# Patient Record
Sex: FEMALE | Race: WHITE | NOT HISPANIC OR LATINO | Employment: OTHER | URBAN - NONMETROPOLITAN AREA
[De-identification: names, ages, dates, MRNs, and addresses within clinical notes are randomized per-mention and may not be internally consistent; named-entity substitution may affect disease eponyms.]

---

## 2017-06-24 ENCOUNTER — HOSPITAL ENCOUNTER (EMERGENCY)
Facility: HOSPITAL | Age: 67
Discharge: HOME | End: 2017-06-25
Attending: FAMILY MEDICINE | Admitting: FAMILY MEDICINE

## 2017-06-24 ENCOUNTER — APPOINTMENT (OUTPATIENT)
Dept: CT IMAGING | Facility: HOSPITAL | Age: 67
End: 2017-06-24

## 2017-06-24 ENCOUNTER — APPOINTMENT (OUTPATIENT)
Dept: GENERAL RADIOLOGY | Facility: HOSPITAL | Age: 67
End: 2017-06-24

## 2017-06-24 DIAGNOSIS — J44.1 COPD WITH ACUTE EXACERBATION (HCC): Primary | ICD-10-CM

## 2017-06-24 DIAGNOSIS — J18.9 CAP (COMMUNITY ACQUIRED PNEUMONIA): ICD-10-CM

## 2017-06-24 LAB
6-ACETYL MORPHINE: NEGATIVE
A-A DO2: 30 MMHG (ref 0–300)
ALBUMIN SERPL-MCNC: 3.9 G/DL (ref 3.4–4.8)
ALBUMIN/GLOB SERPL: 1.6 G/DL (ref 1.5–2.5)
ALP SERPL-CCNC: 57 U/L (ref 35–104)
ALT SERPL W P-5'-P-CCNC: 10 U/L (ref 10–36)
AMPHET+METHAMPHET UR QL: NEGATIVE
ANION GAP SERPL CALCULATED.3IONS-SCNC: 3.6 MMOL/L (ref 3.6–11.2)
APTT PPP: <23 SECONDS (ref 24.4–31)
ARTERIAL PATENCY WRIST A: POSITIVE
AST SERPL-CCNC: 12 U/L (ref 10–30)
ATMOSPHERIC PRESS: 725 MMHG
BARBITURATES UR QL SCN: NEGATIVE
BASE EXCESS BLDA CALC-SCNC: -5.8 MMOL/L
BASOPHILS # BLD AUTO: 0.02 10*3/MM3 (ref 0–0.3)
BASOPHILS NFR BLD AUTO: 0.1 % (ref 0–2)
BDY SITE: ABNORMAL
BENZODIAZ UR QL SCN: POSITIVE
BILIRUB SERPL-MCNC: 0.1 MG/DL (ref 0.2–1.8)
BILIRUB UR QL STRIP: NEGATIVE
BNP SERPL-MCNC: 116 PG/ML (ref 0–100)
BODY TEMPERATURE: 98.6 C
BUN BLD-MCNC: 17 MG/DL (ref 7–21)
BUN/CREAT SERPL: 17.7 (ref 7–25)
BUPRENORPHINE SERPL-MCNC: NEGATIVE NG/ML
CALCIUM SPEC-SCNC: 8.8 MG/DL (ref 7.7–10)
CANNABINOIDS SERPL QL: NEGATIVE
CHLORIDE SERPL-SCNC: 114 MMOL/L (ref 99–112)
CK MB SERPL-CCNC: 1.8 NG/ML (ref 0–5)
CK MB SERPL-RTO: 3.2 % (ref 0–3)
CK SERPL-CCNC: 57 U/L (ref 24–173)
CLARITY UR: CLEAR
CO2 SERPL-SCNC: 26.4 MMOL/L (ref 24.3–31.9)
COCAINE UR QL: NEGATIVE
COHGB MFR BLD: 3.1 % (ref 0–5)
COLOR UR: YELLOW
CREAT BLD-MCNC: 0.96 MG/DL (ref 0.43–1.29)
DEPRECATED RDW RBC AUTO: 49.2 FL (ref 37–54)
EOSINOPHIL # BLD AUTO: 0.34 10*3/MM3 (ref 0–0.7)
EOSINOPHIL NFR BLD AUTO: 2 % (ref 0–7)
ERYTHROCYTE [DISTWIDTH] IN BLOOD BY AUTOMATED COUNT: 15.3 % (ref 11.5–14.5)
GFR SERPL CREATININE-BSD FRML MDRD: 58 ML/MIN/1.73
GLOBULIN UR ELPH-MCNC: 2.5 GM/DL
GLUCOSE BLD-MCNC: 62 MG/DL (ref 70–110)
GLUCOSE UR STRIP-MCNC: NEGATIVE MG/DL
HCO3 BLDA-SCNC: 18.3 MMOL/L (ref 22–26)
HCT VFR BLD AUTO: 37.7 % (ref 37–47)
HCT VFR BLD CALC: 35 % (ref 37–47)
HGB BLD-MCNC: 11.8 G/DL (ref 12–16)
HGB BLDA-MCNC: 12 G/DL (ref 12–16)
HGB UR QL STRIP.AUTO: NEGATIVE
HOROWITZ INDEX BLD+IHG-RTO: 21 %
IMM GRANULOCYTES # BLD: 0.13 10*3/MM3 (ref 0–0.03)
IMM GRANULOCYTES NFR BLD: 0.8 % (ref 0–0.5)
INR PPP: 0.97 (ref 0.8–1.1)
KETONES UR QL STRIP: NEGATIVE
LEUKOCYTE ESTERASE UR QL STRIP.AUTO: NEGATIVE
LYMPHOCYTES # BLD AUTO: 3.36 10*3/MM3 (ref 1–3)
LYMPHOCYTES NFR BLD AUTO: 19.8 % (ref 16–46)
MCH RBC QN AUTO: 29 PG (ref 27–33)
MCHC RBC AUTO-ENTMCNC: 31.3 G/DL (ref 33–37)
MCV RBC AUTO: 92.6 FL (ref 80–94)
MDMA UR QL SCN: NEGATIVE
METHADONE UR QL SCN: NEGATIVE
METHGB BLD QL: 0.3 % (ref 0–3)
MODALITY: ABNORMAL
MONOCYTES # BLD AUTO: 1.61 10*3/MM3 (ref 0.1–0.9)
MONOCYTES NFR BLD AUTO: 9.5 % (ref 0–12)
NEUTROPHILS # BLD AUTO: 11.54 10*3/MM3 (ref 1.4–6.5)
NEUTROPHILS NFR BLD AUTO: 67.8 % (ref 40–75)
NITRITE UR QL STRIP: NEGATIVE
OPIATES UR QL: POSITIVE
OSMOLALITY SERPL CALC.SUM OF ELEC: 286.4 MOSM/KG (ref 273–305)
OXYCODONE UR QL SCN: NEGATIVE
OXYHGB MFR BLDV: 91.7 % (ref 85–100)
PCO2 BLDA: 31.4 MM HG (ref 35–45)
PCP UR QL SCN: NEGATIVE
PH BLDA: 7.38 PH UNITS (ref 7.35–7.45)
PH UR STRIP.AUTO: <=5 [PH] (ref 5–8)
PLATELET # BLD AUTO: 305 10*3/MM3 (ref 130–400)
PMV BLD AUTO: 11.7 FL (ref 6–10)
PO2 BLDA: 74.8 MM HG (ref 80–100)
POTASSIUM BLD-SCNC: 4.5 MMOL/L (ref 3.5–5.3)
PROT SERPL-MCNC: 6.4 G/DL (ref 6–8)
PROT UR QL STRIP: NEGATIVE
PROTHROMBIN TIME: 10.8 SECONDS (ref 9.8–11.9)
RBC # BLD AUTO: 4.07 10*6/MM3 (ref 4.2–5.4)
SAO2 % BLDCOA: 94.9 % (ref 90–100)
SODIUM BLD-SCNC: 144 MMOL/L (ref 135–153)
SP GR UR STRIP: 1.03 (ref 1–1.03)
TROPONIN I SERPL-MCNC: <0.006 NG/ML
UROBILINOGEN UR QL STRIP: NORMAL
WBC NRBC COR # BLD: 17 10*3/MM3 (ref 4.5–12.5)

## 2017-06-24 PROCEDURE — 82805 BLOOD GASES W/O2 SATURATION: CPT | Performed by: FAMILY MEDICINE

## 2017-06-24 PROCEDURE — 80307 DRUG TEST PRSMV CHEM ANLYZR: CPT | Performed by: FAMILY MEDICINE

## 2017-06-24 PROCEDURE — 85025 COMPLETE CBC W/AUTO DIFF WBC: CPT | Performed by: FAMILY MEDICINE

## 2017-06-24 PROCEDURE — 82553 CREATINE MB FRACTION: CPT | Performed by: FAMILY MEDICINE

## 2017-06-24 PROCEDURE — 82550 ASSAY OF CK (CPK): CPT | Performed by: FAMILY MEDICINE

## 2017-06-24 PROCEDURE — 81003 URINALYSIS AUTO W/O SCOPE: CPT | Performed by: FAMILY MEDICINE

## 2017-06-24 PROCEDURE — 94799 UNLISTED PULMONARY SVC/PX: CPT

## 2017-06-24 PROCEDURE — 84484 ASSAY OF TROPONIN QUANT: CPT | Performed by: FAMILY MEDICINE

## 2017-06-24 PROCEDURE — 71275 CT ANGIOGRAPHY CHEST: CPT | Performed by: RADIOLOGY

## 2017-06-24 PROCEDURE — 36600 WITHDRAWAL OF ARTERIAL BLOOD: CPT | Performed by: FAMILY MEDICINE

## 2017-06-24 PROCEDURE — 71010 XR CHEST 1 VW: CPT | Performed by: RADIOLOGY

## 2017-06-24 PROCEDURE — 93010 ELECTROCARDIOGRAM REPORT: CPT | Performed by: INTERNAL MEDICINE

## 2017-06-24 PROCEDURE — 93005 ELECTROCARDIOGRAM TRACING: CPT | Performed by: FAMILY MEDICINE

## 2017-06-24 PROCEDURE — 94640 AIRWAY INHALATION TREATMENT: CPT

## 2017-06-24 PROCEDURE — 71010 HC CHEST PA OR AP: CPT

## 2017-06-24 PROCEDURE — 87040 BLOOD CULTURE FOR BACTERIA: CPT | Performed by: FAMILY MEDICINE

## 2017-06-24 PROCEDURE — 80053 COMPREHEN METABOLIC PANEL: CPT | Performed by: FAMILY MEDICINE

## 2017-06-24 PROCEDURE — 83880 ASSAY OF NATRIURETIC PEPTIDE: CPT | Performed by: FAMILY MEDICINE

## 2017-06-24 PROCEDURE — 99284 EMERGENCY DEPT VISIT MOD MDM: CPT

## 2017-06-24 PROCEDURE — 83050 HGB METHEMOGLOBIN QUAN: CPT | Performed by: FAMILY MEDICINE

## 2017-06-24 PROCEDURE — 71275 CT ANGIOGRAPHY CHEST: CPT

## 2017-06-24 PROCEDURE — 82375 ASSAY CARBOXYHB QUANT: CPT | Performed by: FAMILY MEDICINE

## 2017-06-24 PROCEDURE — 85730 THROMBOPLASTIN TIME PARTIAL: CPT | Performed by: FAMILY MEDICINE

## 2017-06-24 PROCEDURE — 85610 PROTHROMBIN TIME: CPT | Performed by: FAMILY MEDICINE

## 2017-06-24 PROCEDURE — 87086 URINE CULTURE/COLONY COUNT: CPT | Performed by: FAMILY MEDICINE

## 2017-06-24 RX ORDER — METHYLPREDNISOLONE SODIUM SUCCINATE 125 MG/2ML
125 INJECTION, POWDER, LYOPHILIZED, FOR SOLUTION INTRAMUSCULAR; INTRAVENOUS ONCE
Status: COMPLETED | OUTPATIENT
Start: 2017-06-24 | End: 2017-06-25

## 2017-06-24 RX ORDER — GABAPENTIN 800 MG/1
800 TABLET ORAL 3 TIMES DAILY
COMMUNITY

## 2017-06-24 RX ORDER — IPRATROPIUM BROMIDE AND ALBUTEROL SULFATE 2.5; .5 MG/3ML; MG/3ML
3 SOLUTION RESPIRATORY (INHALATION) ONCE
Status: COMPLETED | OUTPATIENT
Start: 2017-06-24 | End: 2017-06-24

## 2017-06-24 RX ORDER — HYDROCODONE BITARTRATE AND ACETAMINOPHEN 7.5; 325 MG/1; MG/1
1 TABLET ORAL EVERY 8 HOURS PRN
COMMUNITY

## 2017-06-24 RX ORDER — SODIUM CHLORIDE 0.9 % (FLUSH) 0.9 %
10 SYRINGE (ML) INJECTION AS NEEDED
Status: DISCONTINUED | OUTPATIENT
Start: 2017-06-24 | End: 2017-06-25 | Stop reason: HOSPADM

## 2017-06-24 RX ORDER — ASPIRIN 81 MG/1
81 TABLET, CHEWABLE ORAL DAILY
COMMUNITY

## 2017-06-24 RX ADMIN — IPRATROPIUM BROMIDE AND ALBUTEROL SULFATE 3 ML: .5; 3 SOLUTION RESPIRATORY (INHALATION) at 22:01

## 2017-06-25 VITALS
TEMPERATURE: 97.4 F | RESPIRATION RATE: 20 BRPM | BODY MASS INDEX: 20.2 KG/M2 | HEIGHT: 61 IN | OXYGEN SATURATION: 98 % | DIASTOLIC BLOOD PRESSURE: 80 MMHG | HEART RATE: 80 BPM | WEIGHT: 107 LBS | SYSTOLIC BLOOD PRESSURE: 129 MMHG

## 2017-06-25 PROCEDURE — 96374 THER/PROPH/DIAG INJ IV PUSH: CPT

## 2017-06-25 PROCEDURE — 0 IOPAMIDOL PER 1 ML: Performed by: FAMILY MEDICINE

## 2017-06-25 PROCEDURE — 25010000002 METHYLPREDNISOLONE PER 125 MG: Performed by: FAMILY MEDICINE

## 2017-06-25 RX ORDER — DOXYCYCLINE HYCLATE 100 MG/1
100 TABLET, DELAYED RELEASE ORAL 2 TIMES DAILY
Qty: 20 TABLET | Refills: 0 | Status: SHIPPED | OUTPATIENT
Start: 2017-06-25 | End: 2017-07-05

## 2017-06-25 RX ORDER — PREDNISONE 20 MG/1
20 TABLET ORAL 3 TIMES DAILY
Qty: 15 TABLET | Refills: 0 | Status: SHIPPED | OUTPATIENT
Start: 2017-06-25 | End: 2017-06-30

## 2017-06-25 RX ORDER — DOXYCYCLINE 100 MG/1
100 CAPSULE ORAL ONCE
Status: COMPLETED | OUTPATIENT
Start: 2017-06-25 | End: 2017-06-25

## 2017-06-25 RX ADMIN — DOXYCYCLINE 100 MG: 100 CAPSULE ORAL at 00:47

## 2017-06-25 RX ADMIN — IOPAMIDOL 80 ML: 755 INJECTION, SOLUTION INTRAVENOUS at 00:04

## 2017-06-25 RX ADMIN — METHYLPREDNISOLONE SODIUM SUCCINATE 125 MG: 125 INJECTION, POWDER, FOR SOLUTION INTRAMUSCULAR; INTRAVENOUS at 00:08

## 2017-06-25 NOTE — ED NOTES
[facility] spoke with Marisel requested ER records from last week. She request a signed medical records release faxed to [phone].      Heather Symes  06/24/17 0173

## 2017-06-25 NOTE — ED PROVIDER NOTES
Subjective   Patient is a 66 y.o. female presenting with shortness of breath.   History provided by:  Patient  Shortness of Breath   Severity:  Mild  Onset quality:  Gradual  Timing:  Intermittent  Progression:  Worsening  Chronicity:  Recurrent  Context: not activity, not occupational exposure and not URI    Associated symptoms: cough    Associated symptoms: no abdominal pain, no chest pain, no claudication, no headaches, no neck pain, no rash, no vomiting and no wheezing    Risk factors: tobacco use    Risk factors: no recent alcohol use, no family hx of DVT, no hx of cancer, no hx of PE/DVT, no obesity and no oral contraceptive use        Review of Systems   Constitutional: Negative for activity change, appetite change, chills and fatigue.   HENT: Negative for congestion.    Eyes: Negative for pain.   Respiratory: Positive for cough and shortness of breath. Negative for wheezing and stridor.    Cardiovascular: Negative for chest pain and claudication.   Gastrointestinal: Negative for abdominal pain, diarrhea, nausea and vomiting.   Genitourinary: Negative for dysuria.   Musculoskeletal: Negative for arthralgias, myalgias, neck pain and neck stiffness.   Skin: Negative for rash.   Neurological: Negative for dizziness, syncope, speech difficulty, weakness and headaches.   Psychiatric/Behavioral: Negative for agitation.       History reviewed. No pertinent past medical history.    No Known Allergies    History reviewed. No pertinent surgical history.    Family History   Problem Relation Age of Onset   • No Known Problems Mother    • No Known Problems Father    • No Known Problems Sister    • No Known Problems Brother    • No Known Problems Son    • No Known Problems Daughter    • No Known Problems Maternal Grandmother    • No Known Problems Maternal Grandfather    • No Known Problems Paternal Grandmother    • No Known Problems Paternal Grandfather    • No Known Problems Cousin    • Rheum arthritis Neg Hx    •  Osteoarthritis Neg Hx    • Asthma Neg Hx    • Diabetes Neg Hx    • Heart failure Neg Hx    • Hyperlipidemia Neg Hx    • Hypertension Neg Hx    • Migraines Neg Hx    • Rashes / Skin problems Neg Hx    • Seizures Neg Hx    • Stroke Neg Hx    • Thyroid disease Neg Hx        Social History     Social History   • Marital status:      Spouse name: N/A   • Number of children: N/A   • Years of education: N/A     Social History Main Topics   • Smoking status: Former Smoker   • Smokeless tobacco: Never Used   • Alcohol use No   • Drug use: No   • Sexual activity: Defer     Other Topics Concern   • None     Social History Narrative   • None           Objective   Physical Exam   Constitutional: She is oriented to person, place, and time. She appears well-developed.   HENT:   Head: Normocephalic and atraumatic.   Right Ear: External ear normal.   Left Ear: External ear normal.   Mouth/Throat: Oropharynx is clear and moist.   Eyes: EOM are normal. Pupils are equal, round, and reactive to light.   Neck: Neck supple. No thyromegaly present.   Cardiovascular: Normal rate.    Pulmonary/Chest: Effort normal and breath sounds normal.   Abdominal: Soft. Bowel sounds are normal.   Musculoskeletal: Normal range of motion.   Neurological: She is alert and oriented to person, place, and time.   Skin: Skin is warm. No rash noted. No erythema. No pallor.   Psychiatric: She has a normal mood and affect. Her behavior is normal. Judgment and thought content normal.   Nursing note and vitals reviewed.      Procedures         ED Course  ED Course   Value Comment By Time   ECG 12 Lead EKG interpretation time is 2159 sinus rhythm 76 bpm [place] interval 114 QRS duration is 70 QT is 382 QTc is 429 no evidence of acute ischemic change Ivonne Wagner, DO 06/25 0000    REviewed medical records for [facility] from 6/13 - pt went for same complaints SOA, back pain - pt had stated that they told her she had a lung mass and that she would  have to go see her pcp and get further evaluation- pt is unable to get into her pcp; says that her symptoms are the same- records show t hat she was treated for COPD exacerbation; no mention of black spot on her lung; just RLL pneumonia and cOPD Ivonne Alcantara Kristy, DO 06/25 0644                  MDM  Number of Diagnoses or Management Options  CAP (community acquired pneumonia): new and does not require workup  COPD with acute exacerbation: new and does not require workup     Amount and/or Complexity of Data Reviewed  Clinical lab tests: ordered and reviewed  Tests in the radiology section of CPT®: ordered and reviewed  Tests in the medicine section of CPT®: reviewed and ordered  Independent visualization of images, tracings, or specimens: yes    Risk of Complications, Morbidity, and/or Mortality  Presenting problems: moderate  Diagnostic procedures: moderate  Management options: moderate    Patient Progress  Patient progress: stable      Final diagnoses:   COPD with acute exacerbation   CAP (community acquired pneumonia)            Ivonne Otooledestiny Wagner, DO  06/26/17 0763

## 2017-06-25 NOTE — ED NOTES
Faxed medical records release to [facility] at [phone].      Heather Symes  06/24/17 1387

## 2017-06-27 LAB — BACTERIA SPEC AEROBE CULT: NORMAL

## 2017-06-29 LAB
BACTERIA SPEC AEROBE CULT: NORMAL
BACTERIA SPEC AEROBE CULT: NORMAL

## 2018-03-05 ENCOUNTER — HOSPITAL ENCOUNTER (EMERGENCY)
Facility: HOSPITAL | Age: 68
Discharge: HOME | End: 2018-03-05
Attending: EMERGENCY MEDICINE | Admitting: EMERGENCY MEDICINE

## 2018-03-05 ENCOUNTER — APPOINTMENT (OUTPATIENT)
Dept: GENERAL RADIOLOGY | Facility: HOSPITAL | Age: 68
End: 2018-03-05

## 2018-03-05 VITALS
RESPIRATION RATE: 20 BRPM | BODY MASS INDEX: 20.2 KG/M2 | TEMPERATURE: 98 F | OXYGEN SATURATION: 98 % | DIASTOLIC BLOOD PRESSURE: 88 MMHG | HEIGHT: 61 IN | SYSTOLIC BLOOD PRESSURE: 138 MMHG | HEART RATE: 80 BPM | WEIGHT: 107 LBS

## 2018-03-05 DIAGNOSIS — J44.1 COPD EXACERBATION (HCC): Primary | ICD-10-CM

## 2018-03-05 LAB
ALBUMIN SERPL-MCNC: 4.4 G/DL (ref 3.4–4.8)
ALBUMIN/GLOB SERPL: 2 G/DL (ref 1.5–2.5)
ALP SERPL-CCNC: 64 U/L (ref 35–104)
ALT SERPL W P-5'-P-CCNC: 11 U/L (ref 10–36)
ANION GAP SERPL CALCULATED.3IONS-SCNC: 6 MMOL/L (ref 3.6–11.2)
AST SERPL-CCNC: 17 U/L (ref 10–30)
BASOPHILS # BLD AUTO: 0.06 10*3/MM3 (ref 0–0.3)
BASOPHILS NFR BLD AUTO: 0.8 % (ref 0–2)
BILIRUB SERPL-MCNC: 0.2 MG/DL (ref 0.2–1.8)
BNP SERPL-MCNC: 38 PG/ML (ref 0–100)
BUN BLD-MCNC: 10 MG/DL (ref 7–21)
BUN/CREAT SERPL: 10.6 (ref 7–25)
CALCIUM SPEC-SCNC: 8.9 MG/DL (ref 7.7–10)
CHLORIDE SERPL-SCNC: 111 MMOL/L (ref 99–112)
CO2 SERPL-SCNC: 24 MMOL/L (ref 24.3–31.9)
CREAT BLD-MCNC: 0.94 MG/DL (ref 0.43–1.29)
DEPRECATED RDW RBC AUTO: 51.6 FL (ref 37–54)
EOSINOPHIL # BLD AUTO: 0.35 10*3/MM3 (ref 0–0.7)
EOSINOPHIL NFR BLD AUTO: 4.4 % (ref 0–7)
ERYTHROCYTE [DISTWIDTH] IN BLOOD BY AUTOMATED COUNT: 16 % (ref 11.5–14.5)
GFR SERPL CREATININE-BSD FRML MDRD: 60 ML/MIN/1.73
GLOBULIN UR ELPH-MCNC: 2.2 GM/DL
GLUCOSE BLD-MCNC: 86 MG/DL (ref 70–110)
HCT VFR BLD AUTO: 42.4 % (ref 37–47)
HGB BLD-MCNC: 13.3 G/DL (ref 12–16)
HOLD SPECIMEN: NORMAL
HOLD SPECIMEN: NORMAL
IMM GRANULOCYTES # BLD: 0.02 10*3/MM3 (ref 0–0.03)
IMM GRANULOCYTES NFR BLD: 0.3 % (ref 0–0.5)
LYMPHOCYTES # BLD AUTO: 1.82 10*3/MM3 (ref 1–3)
LYMPHOCYTES NFR BLD AUTO: 23 % (ref 16–46)
MCH RBC QN AUTO: 28.7 PG (ref 27–33)
MCHC RBC AUTO-ENTMCNC: 31.4 G/DL (ref 33–37)
MCV RBC AUTO: 91.4 FL (ref 80–94)
MONOCYTES # BLD AUTO: 0.84 10*3/MM3 (ref 0.1–0.9)
MONOCYTES NFR BLD AUTO: 10.6 % (ref 0–12)
NEUTROPHILS # BLD AUTO: 4.82 10*3/MM3 (ref 1.4–6.5)
NEUTROPHILS NFR BLD AUTO: 60.9 % (ref 40–75)
OSMOLALITY SERPL CALC.SUM OF ELEC: 279.6 MOSM/KG (ref 273–305)
PLATELET # BLD AUTO: 225 10*3/MM3 (ref 130–400)
PMV BLD AUTO: 11 FL (ref 6–10)
POTASSIUM BLD-SCNC: 4 MMOL/L (ref 3.5–5.3)
PROT SERPL-MCNC: 6.6 G/DL (ref 6–8)
RBC # BLD AUTO: 4.64 10*6/MM3 (ref 4.2–5.4)
SODIUM BLD-SCNC: 141 MMOL/L (ref 135–153)
TROPONIN I SERPL-MCNC: <0.006 NG/ML
WBC NRBC COR # BLD: 7.91 10*3/MM3 (ref 4.5–12.5)
WHOLE BLOOD HOLD SPECIMEN: NORMAL
WHOLE BLOOD HOLD SPECIMEN: NORMAL

## 2018-03-05 PROCEDURE — 83880 ASSAY OF NATRIURETIC PEPTIDE: CPT | Performed by: EMERGENCY MEDICINE

## 2018-03-05 PROCEDURE — 93010 ELECTROCARDIOGRAM REPORT: CPT | Performed by: INTERNAL MEDICINE

## 2018-03-05 PROCEDURE — 94799 UNLISTED PULMONARY SVC/PX: CPT

## 2018-03-05 PROCEDURE — 71046 X-RAY EXAM CHEST 2 VIEWS: CPT

## 2018-03-05 PROCEDURE — 84484 ASSAY OF TROPONIN QUANT: CPT | Performed by: EMERGENCY MEDICINE

## 2018-03-05 PROCEDURE — 63710000001 PREDNISONE PER 1 MG: Performed by: EMERGENCY MEDICINE

## 2018-03-05 PROCEDURE — 93005 ELECTROCARDIOGRAM TRACING: CPT | Performed by: EMERGENCY MEDICINE

## 2018-03-05 PROCEDURE — 85025 COMPLETE CBC W/AUTO DIFF WBC: CPT | Performed by: EMERGENCY MEDICINE

## 2018-03-05 PROCEDURE — 99284 EMERGENCY DEPT VISIT MOD MDM: CPT

## 2018-03-05 PROCEDURE — 71046 X-RAY EXAM CHEST 2 VIEWS: CPT | Performed by: RADIOLOGY

## 2018-03-05 PROCEDURE — 80053 COMPREHEN METABOLIC PANEL: CPT | Performed by: EMERGENCY MEDICINE

## 2018-03-05 PROCEDURE — 94640 AIRWAY INHALATION TREATMENT: CPT

## 2018-03-05 RX ORDER — DOXYCYCLINE HYCLATE 100 MG/1
100 TABLET, DELAYED RELEASE ORAL 2 TIMES DAILY
Qty: 20 TABLET | Refills: 0 | Status: SHIPPED | OUTPATIENT
Start: 2018-03-05 | End: 2018-03-15

## 2018-03-05 RX ORDER — SODIUM CHLORIDE 0.9 % (FLUSH) 0.9 %
10 SYRINGE (ML) INJECTION AS NEEDED
Status: DISCONTINUED | OUTPATIENT
Start: 2018-03-05 | End: 2018-03-05 | Stop reason: HOSPADM

## 2018-03-05 RX ORDER — PREDNISONE 20 MG/1
40 TABLET ORAL ONCE
Status: COMPLETED | OUTPATIENT
Start: 2018-03-05 | End: 2018-03-05

## 2018-03-05 RX ORDER — PREDNISONE 20 MG/1
20 TABLET ORAL 2 TIMES DAILY
Qty: 8 TABLET | Refills: 0 | Status: SHIPPED | OUTPATIENT
Start: 2018-03-05 | End: 2018-03-09

## 2018-03-05 RX ORDER — DOXYCYCLINE 100 MG/1
100 CAPSULE ORAL ONCE
Status: COMPLETED | OUTPATIENT
Start: 2018-03-05 | End: 2018-03-05

## 2018-03-05 RX ORDER — IPRATROPIUM BROMIDE AND ALBUTEROL SULFATE 2.5; .5 MG/3ML; MG/3ML
3 SOLUTION RESPIRATORY (INHALATION) ONCE
Status: COMPLETED | OUTPATIENT
Start: 2018-03-05 | End: 2018-03-05

## 2018-03-05 RX ADMIN — PREDNISONE 40 MG: 20 TABLET ORAL at 19:35

## 2018-03-05 RX ADMIN — DOXYCYCLINE 100 MG: 100 CAPSULE ORAL at 20:09

## 2018-03-05 RX ADMIN — IPRATROPIUM BROMIDE AND ALBUTEROL SULFATE 3 ML: .5; 3 SOLUTION RESPIRATORY (INHALATION) at 20:02

## 2018-03-05 RX ADMIN — IPRATROPIUM BROMIDE AND ALBUTEROL SULFATE 3 ML: .5; 3 SOLUTION RESPIRATORY (INHALATION) at 19:36

## 2018-03-05 NOTE — ED PROVIDER NOTES
Subjective   HPI Comments: 67-year-old female was an active smoker presents with shortness of breath.  The patient states that she has a known history of COPD.  She has not been on steroids or antibiotics for approximately 4 weeks.  The patient smokes a pack a day.  She states she is been unable to smoke any cigarettes today.  She states she has had cough and sometimes has sputum production however she feels she is unable to bring up the mucus.  The patient denies any fevers or chills.  She has home oxygen that she wears only at night.  She states that she has felt weak.  She denies any chest pain.  She does not wish to stay in the hospital at all.  She reports no hemoptysis.  Denies any history of blood clots in the leg or the lung.      History provided by:  Patient      Review of Systems   Constitutional: Positive for activity change and fatigue. Negative for chills and fever.   HENT: Negative for congestion.    Respiratory: Positive for cough, chest tightness, shortness of breath and wheezing.    Cardiovascular: Negative for chest pain.   Gastrointestinal: Negative for abdominal pain.   Genitourinary: Negative for dysuria.   Musculoskeletal: Negative for myalgias, neck pain and neck stiffness.   Skin: Negative for pallor, rash and wound.   Neurological: Positive for weakness.       Past Medical History:   Diagnosis Date   • COPD (chronic obstructive pulmonary disease)    • Hyperlipidemia    • Hypertension        No Known Allergies    History reviewed. No pertinent surgical history.    Family History   Problem Relation Age of Onset   • No Known Problems Mother    • No Known Problems Father    • No Known Problems Sister    • No Known Problems Brother    • No Known Problems Son    • No Known Problems Daughter    • No Known Problems Maternal Grandmother    • No Known Problems Maternal Grandfather    • No Known Problems Paternal Grandmother    • No Known Problems Paternal Grandfather    • No Known Problems Cousin     • Rheum arthritis Neg Hx    • Osteoarthritis Neg Hx    • Asthma Neg Hx    • Diabetes Neg Hx    • Heart failure Neg Hx    • Hyperlipidemia Neg Hx    • Hypertension Neg Hx    • Migraines Neg Hx    • Rashes / Skin problems Neg Hx    • Seizures Neg Hx    • Stroke Neg Hx    • Thyroid disease Neg Hx        Social History     Social History   • Marital status:      Social History Main Topics   • Smoking status: Current Every Day Smoker     Packs/day: 1.00     Types: Cigarettes   • Smokeless tobacco: Never Used   • Alcohol use No   • Drug use: No   • Sexual activity: Defer           Objective   Physical Exam   Constitutional: She appears well-developed.   HENT:   Head: Normocephalic.   Right Ear: External ear normal.   Left Ear: External ear normal.   Eyes: Conjunctivae are normal.   Neck: Neck supple.   Pulmonary/Chest: Effort normal. She has wheezes in the right upper field, the right middle field, the right lower field, the left upper field, the left middle field and the left lower field. She has rhonchi in the right upper field and the left upper field. She has no rales.   Abdominal: Soft.       Procedures         ED Course  ED Course    EKG performed at 1839  Normal sinus rhythm, the rate is 78.  The intervals are normal.  There are no acute ST or T-wave changes to suggest any ischemia or infarction.  Axis is normal.              MDM  Number of Diagnoses or Management Options  COPD exacerbation:   Diagnosis management comments: The patient is adamant that she will [facility].  Her oxygen level has been normal.  She did not completely clear after 2 treatments however she does sound better, reports that she feels improved states that she has aerosols and nebulizer at home and wishes to go.  The patient knows that she may come back at any time to be admitted if she needs to.  I am discharging her with steroids and antibiotics.  I did  her on smoking cessation for approximately 10 minutes.      Final  diagnoses:   COPD exacerbation            Michelle Joyner,   03/05/18 3294

## 2018-03-06 NOTE — ED NOTES
Patient lying in bed resting at this time with HOB elevated. States that she has been having increased SOB that began around 3 am. States that she has used her PRN oxygen. States that she just thought she needed to come and be seen. Denies any needs at this time. Vitals WNL. Call light within reach.      Tere Hi RN  03/05/18 1943

## 2018-03-06 NOTE — DISCHARGE INSTRUCTIONS
Chronic Obstructive Pulmonary Disease Exacerbation  Chronic obstructive pulmonary disease (COPD) is a common lung condition in which airflow from the lungs is limited. COPD is a general term that can be used to describe many different lung problems that limit airflow, including chronic bronchitis and emphysema. COPD exacerbations are episodes when breathing symptoms become much worse and require extra treatment. Without treatment, COPD exacerbations can be life threatening, and frequent COPD exacerbations can cause further damage to your lungs.  What are the causes?  · Respiratory infections.  · Exposure to smoke.  · Exposure to air pollution, chemical fumes, or dust.  Sometimes there is no apparent cause or trigger.  What increases the risk?  · Smoking cigarettes.  · Older age.  · Frequent prior COPD exacerbations.  What are the signs or symptoms?  · Increased coughing.  · Increased thick spit (sputum) production.  · Increased wheezing.  · Increased shortness of breath.  · Rapid breathing.  · Chest tightness.  How is this diagnosed?  Your medical history, a physical exam, and tests will help your health care provider make a diagnosis. Tests may include:  · A chest X-ray.  · Basic lab tests.  · Sputum testing.  · An arterial blood gas test.  How is this treated?  Depending on the severity of your COPD exacerbation, you may need to be admitted to a hospital for treatment. Some of the treatments commonly used to treat COPD exacerbations are:  · Antibiotic medicines.  · Bronchodilators. These are drugs that expand the air passages. They may be given with an inhaler or nebulizer. Spacer devices may be needed to help improve drug delivery.  · Corticosteroid medicines.  · Supplemental oxygen therapy.  · Airway clearing techniques, such as noninvasive ventilation (NIV) and positive expiratory pressure (PEP). These provide respiratory support through a mask or other noninvasive device.  Follow these instructions at  home:  · Do not smoke. Quitting smoking is very important to prevent COPD from getting worse and exacerbations from happening as often.  · Avoid exposure to all substances that irritate the airway, especially to tobacco smoke.  · If you were prescribed an antibiotic medicine, finish it all even if you start to feel better.  · Take all medicines as directed by your health care provider. It is important to use correct technique with inhaled medicines.  · Drink enough fluids to keep your urine clear or pale yellow (unless you have a medical condition that requires fluid restriction).  · Use a cool mist vaporizer. This makes it easier to clear your chest when you cough.  · If you have a home nebulizer and oxygen, continue to use them as directed.  · Maintain all necessary vaccinations to prevent infections.  · Exercise regularly.  · Eat a healthy diet.  · Keep all follow-up appointments as directed by your health care provider.  Get help right away if:  · You have worsening shortness of breath.  · You have trouble talking.  · You have severe chest pain.  · You have blood in your sputum.  · You have a fever.  · You have weakness, vomit repeatedly, or faint.  · You feel confused.  · You continue to get worse.  This information is not intended to replace advice given to you by your health care provider. Make sure you discuss any questions you have with your health care provider.  Document Released: 10/14/2008 Document Revised: 05/25/2017 Document Reviewed: 08/22/2014  [employer] Interactive Patient Education © 2017 [employer]      Cough, Adult  A cough helps to clear your throat and lungs. A cough may last only 2-3 weeks (acute), or it may last longer than 8 weeks (chronic). Many different things can cause a cough. A cough may be a sign of an illness or another medical condition.  Follow these instructions at home:  · Pay attention to any changes in your cough.  · Take medicines only as told by your doctor.  ¨ If you were  prescribed an antibiotic medicine, take it as told by your doctor. Do not stop taking it even if you start to feel better.  ¨ Talk with your doctor before you try using a cough medicine.  · Drink enough fluid to keep your pee (urine) clear or pale yellow.  · If the air is dry, use a cold steam vaporizer or humidifier in your home.  · Stay away from things that make you cough at work or at home.  · If your cough is worse at night, try using extra pillows to raise your head up higher while you sleep.  · Do not smoke, and try not to be around smoke. If you need help quitting, ask your doctor.  · Do not have caffeine.  · Do not drink alcohol.  · Rest as needed.  Contact a doctor if:  · You have new problems (symptoms).  · You cough up yellow fluid (pus).  · Your cough does not get better after 2-3 weeks, or your cough gets worse.  · Medicine does not help your cough and you are not sleeping well.  · You have pain that gets worse or pain that is not helped with medicine.  · You have a fever.  · You are losing weight and you do not know why.  · You have night sweats.  Get help right away if:  · You cough up blood.  · You have trouble breathing.  · Your heartbeat is very fast.  This information is not intended to replace advice given to you by your health care provider. Make sure you discuss any questions you have with your health care provider.  Document Released: 08/30/2012 Document Revised: 05/25/2017 Document Reviewed: 02/24/2016  [employer] Interactive Patient Education © 2017 [employer]

## 2018-12-15 ENCOUNTER — HOSPITAL ENCOUNTER (EMERGENCY)
Facility: HOSPITAL | Age: 68
Discharge: HOME | End: 2018-12-16
Attending: FAMILY MEDICINE | Admitting: EMERGENCY MEDICINE

## 2018-12-15 DIAGNOSIS — R10.11 RIGHT UPPER QUADRANT ABDOMINAL PAIN: Primary | ICD-10-CM

## 2018-12-15 LAB
BACTERIA UR QL AUTO: ABNORMAL /HPF
BASOPHILS # BLD AUTO: 0.06 10*3/MM3 (ref 0–0.3)
BASOPHILS NFR BLD AUTO: 0.4 % (ref 0–2)
BILIRUB UR QL STRIP: NEGATIVE
CLARITY UR: CLEAR
COLOR UR: ABNORMAL
DEPRECATED RDW RBC AUTO: 49 FL (ref 37–54)
EOSINOPHIL # BLD AUTO: 0.2 10*3/MM3 (ref 0–0.7)
EOSINOPHIL NFR BLD AUTO: 1.4 % (ref 0–7)
ERYTHROCYTE [DISTWIDTH] IN BLOOD BY AUTOMATED COUNT: 15.1 % (ref 11.5–14.5)
GLUCOSE UR STRIP-MCNC: NEGATIVE MG/DL
HCT VFR BLD AUTO: 38 % (ref 37–47)
HGB BLD-MCNC: 12 G/DL (ref 12–16)
HGB UR QL STRIP.AUTO: NEGATIVE
HYALINE CASTS UR QL AUTO: ABNORMAL /LPF
IMM GRANULOCYTES # BLD: 0.06 10*3/MM3 (ref 0–0.03)
IMM GRANULOCYTES NFR BLD: 0.4 % (ref 0–0.5)
KETONES UR QL STRIP: ABNORMAL
LEUKOCYTE ESTERASE UR QL STRIP.AUTO: ABNORMAL
LYMPHOCYTES # BLD AUTO: 4.01 10*3/MM3 (ref 1–3)
LYMPHOCYTES NFR BLD AUTO: 27.8 % (ref 16–46)
MCH RBC QN AUTO: 29.3 PG (ref 27–33)
MCHC RBC AUTO-ENTMCNC: 31.6 G/DL (ref 33–37)
MCV RBC AUTO: 92.7 FL (ref 80–94)
MONOCYTES # BLD AUTO: 1.22 10*3/MM3 (ref 0.1–0.9)
MONOCYTES NFR BLD AUTO: 8.5 % (ref 0–12)
NEUTROPHILS # BLD AUTO: 8.85 10*3/MM3 (ref 1.4–6.5)
NEUTROPHILS NFR BLD AUTO: 61.5 % (ref 40–75)
NITRITE UR QL STRIP: NEGATIVE
PH UR STRIP.AUTO: 7.5 [PH] (ref 5–8)
PLATELET # BLD AUTO: 237 10*3/MM3 (ref 130–400)
PMV BLD AUTO: 11.6 FL (ref 6–10)
PROT UR QL STRIP: ABNORMAL
RBC # BLD AUTO: 4.1 10*6/MM3 (ref 4.2–5.4)
RBC # UR: ABNORMAL /HPF
REF LAB TEST METHOD: ABNORMAL
SP GR UR STRIP: 1.03 (ref 1–1.03)
SQUAMOUS #/AREA URNS HPF: ABNORMAL /HPF
UROBILINOGEN UR QL STRIP: ABNORMAL
WBC NRBC COR # BLD: 14.4 10*3/MM3 (ref 4.5–12.5)
WBC UR QL AUTO: ABNORMAL /HPF

## 2018-12-15 PROCEDURE — 80053 COMPREHEN METABOLIC PANEL: CPT | Performed by: PHYSICIAN ASSISTANT

## 2018-12-15 PROCEDURE — 81001 URINALYSIS AUTO W/SCOPE: CPT | Performed by: PHYSICIAN ASSISTANT

## 2018-12-15 PROCEDURE — 99283 EMERGENCY DEPT VISIT LOW MDM: CPT

## 2018-12-15 PROCEDURE — 85025 COMPLETE CBC W/AUTO DIFF WBC: CPT | Performed by: PHYSICIAN ASSISTANT

## 2018-12-15 PROCEDURE — 36415 COLL VENOUS BLD VENIPUNCTURE: CPT

## 2018-12-15 PROCEDURE — 83690 ASSAY OF LIPASE: CPT | Performed by: PHYSICIAN ASSISTANT

## 2018-12-16 ENCOUNTER — APPOINTMENT (OUTPATIENT)
Dept: ULTRASOUND IMAGING | Facility: HOSPITAL | Age: 68
End: 2018-12-16

## 2018-12-16 VITALS
DIASTOLIC BLOOD PRESSURE: 61 MMHG | SYSTOLIC BLOOD PRESSURE: 136 MMHG | TEMPERATURE: 97.8 F | WEIGHT: 107 LBS | BODY MASS INDEX: 20.2 KG/M2 | RESPIRATION RATE: 18 BRPM | HEART RATE: 88 BPM | HEIGHT: 61 IN | OXYGEN SATURATION: 97 %

## 2018-12-16 LAB
ALBUMIN SERPL-MCNC: 4.1 G/DL (ref 3.4–4.8)
ALBUMIN/GLOB SERPL: 2.1 G/DL (ref 1.5–2.5)
ALP SERPL-CCNC: 56 U/L (ref 35–104)
ALT SERPL W P-5'-P-CCNC: 10 U/L (ref 10–36)
ANION GAP SERPL CALCULATED.3IONS-SCNC: 3.7 MMOL/L (ref 3.6–11.2)
AST SERPL-CCNC: 12 U/L (ref 10–30)
BILIRUB SERPL-MCNC: 0.2 MG/DL (ref 0.2–1.8)
BUN BLD-MCNC: 18 MG/DL (ref 7–21)
BUN/CREAT SERPL: 19.4 (ref 7–25)
CALCIUM SPEC-SCNC: 8.5 MG/DL (ref 7.7–10)
CHLORIDE SERPL-SCNC: 113 MMOL/L (ref 99–112)
CO2 SERPL-SCNC: 23.3 MMOL/L (ref 24.3–31.9)
CREAT BLD-MCNC: 0.93 MG/DL (ref 0.43–1.29)
GFR SERPL CREATININE-BSD FRML MDRD: 60 ML/MIN/1.73
GLOBULIN UR ELPH-MCNC: 2 GM/DL
GLUCOSE BLD-MCNC: 99 MG/DL (ref 70–110)
LIPASE SERPL-CCNC: 25 U/L (ref 13–60)
OSMOLALITY SERPL CALC.SUM OF ELEC: 281.3 MOSM/KG (ref 273–305)
POTASSIUM BLD-SCNC: 3.6 MMOL/L (ref 3.5–5.3)
PROT SERPL-MCNC: 6.1 G/DL (ref 6–8)
SODIUM BLD-SCNC: 140 MMOL/L (ref 135–153)

## 2018-12-16 PROCEDURE — 76705 ECHO EXAM OF ABDOMEN: CPT | Performed by: RADIOLOGY

## 2018-12-16 PROCEDURE — 76705 ECHO EXAM OF ABDOMEN: CPT

## 2018-12-16 RX ORDER — ONDANSETRON 4 MG/1
4 TABLET, ORALLY DISINTEGRATING ORAL EVERY 8 HOURS PRN
Qty: 12 TABLET | Refills: 0 | Status: SHIPPED | OUTPATIENT
Start: 2018-12-16 | End: 2022-04-26
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
## 2019-01-13 ENCOUNTER — APPOINTMENT (OUTPATIENT)
Dept: GENERAL RADIOLOGY | Facility: HOSPITAL | Age: 69
End: 2019-01-13

## 2019-01-13 ENCOUNTER — APPOINTMENT (OUTPATIENT)
Dept: CT IMAGING | Facility: HOSPITAL | Age: 69
End: 2019-01-13

## 2019-01-13 ENCOUNTER — HOSPITAL ENCOUNTER (EMERGENCY)
Facility: HOSPITAL | Age: 69
Discharge: HOME | End: 2019-01-14
Attending: EMERGENCY MEDICINE | Admitting: EMERGENCY MEDICINE

## 2019-01-13 DIAGNOSIS — I20.8 ANGINA AT REST (HCC): ICD-10-CM

## 2019-01-13 DIAGNOSIS — I10 ESSENTIAL HYPERTENSION: Primary | ICD-10-CM

## 2019-01-13 LAB
ALBUMIN SERPL-MCNC: 4.3 G/DL (ref 3.4–4.8)
ALBUMIN/GLOB SERPL: 1.8 G/DL (ref 1.5–2.5)
ALP SERPL-CCNC: 66 U/L (ref 35–104)
ALT SERPL W P-5'-P-CCNC: 11 U/L (ref 10–36)
ANION GAP SERPL CALCULATED.3IONS-SCNC: 5.4 MMOL/L (ref 3.6–11.2)
AST SERPL-CCNC: 17 U/L (ref 10–30)
BASOPHILS # BLD AUTO: 0.07 10*3/MM3 (ref 0–0.3)
BASOPHILS NFR BLD AUTO: 0.5 % (ref 0–2)
BILIRUB SERPL-MCNC: 0.3 MG/DL (ref 0.2–1.8)
BILIRUB UR QL STRIP: NEGATIVE
BUN BLD-MCNC: 8 MG/DL (ref 7–21)
BUN/CREAT SERPL: 9.3 (ref 7–25)
CALCIUM SPEC-SCNC: 9.1 MG/DL (ref 7.7–10)
CHLORIDE SERPL-SCNC: 113 MMOL/L (ref 99–112)
CLARITY UR: CLEAR
CO2 SERPL-SCNC: 25.6 MMOL/L (ref 24.3–31.9)
COLOR UR: YELLOW
CREAT BLD-MCNC: 0.86 MG/DL (ref 0.43–1.29)
DEPRECATED RDW RBC AUTO: 45.5 FL (ref 37–54)
EOSINOPHIL # BLD AUTO: 0.5 10*3/MM3 (ref 0–0.7)
EOSINOPHIL NFR BLD AUTO: 3.9 % (ref 0–7)
ERYTHROCYTE [DISTWIDTH] IN BLOOD BY AUTOMATED COUNT: 14.1 % (ref 11.5–14.5)
GFR SERPL CREATININE-BSD FRML MDRD: 66 ML/MIN/1.73
GLOBULIN UR ELPH-MCNC: 2.4 GM/DL
GLUCOSE BLD-MCNC: 81 MG/DL (ref 70–110)
GLUCOSE UR STRIP-MCNC: NEGATIVE MG/DL
HCT VFR BLD AUTO: 43.8 % (ref 37–47)
HGB BLD-MCNC: 14.1 G/DL (ref 12–16)
HGB UR QL STRIP.AUTO: NEGATIVE
HOLD SPECIMEN: NORMAL
HOLD SPECIMEN: NORMAL
IMM GRANULOCYTES # BLD AUTO: 0.03 10*3/MM3 (ref 0–0.03)
IMM GRANULOCYTES NFR BLD AUTO: 0.2 % (ref 0–0.5)
KETONES UR QL STRIP: NEGATIVE
LEUKOCYTE ESTERASE UR QL STRIP.AUTO: NEGATIVE
LYMPHOCYTES # BLD AUTO: 3.91 10*3/MM3 (ref 1–3)
LYMPHOCYTES NFR BLD AUTO: 30.3 % (ref 16–46)
MCH RBC QN AUTO: 29.6 PG (ref 27–33)
MCHC RBC AUTO-ENTMCNC: 32.2 G/DL (ref 33–37)
MCV RBC AUTO: 91.8 FL (ref 80–94)
MONOCYTES # BLD AUTO: 0.99 10*3/MM3 (ref 0.1–0.9)
MONOCYTES NFR BLD AUTO: 7.7 % (ref 0–12)
NEUTROPHILS # BLD AUTO: 7.39 10*3/MM3 (ref 1.4–6.5)
NEUTROPHILS NFR BLD AUTO: 57.4 % (ref 40–75)
NITRITE UR QL STRIP: NEGATIVE
OSMOLALITY SERPL CALC.SUM OF ELEC: 284.2 MOSM/KG (ref 273–305)
PH UR STRIP.AUTO: <=5 [PH] (ref 5–8)
PLATELET # BLD AUTO: 236 10*3/MM3 (ref 130–400)
PMV BLD AUTO: 12.3 FL (ref 6–10)
POTASSIUM BLD-SCNC: 4.2 MMOL/L (ref 3.5–5.3)
PROT SERPL-MCNC: 6.7 G/DL (ref 6–8)
PROT UR QL STRIP: NEGATIVE
RBC # BLD AUTO: 4.77 10*6/MM3 (ref 4.2–5.4)
SODIUM BLD-SCNC: 144 MMOL/L (ref 135–153)
SP GR UR STRIP: 1.01 (ref 1–1.03)
TROPONIN I SERPL-MCNC: <0.006 NG/ML
UROBILINOGEN UR QL STRIP: NORMAL
WBC NRBC COR # BLD: 12.89 10*3/MM3 (ref 4.5–12.5)
WHOLE BLOOD HOLD SPECIMEN: NORMAL
WHOLE BLOOD HOLD SPECIMEN: NORMAL

## 2019-01-13 PROCEDURE — 99285 EMERGENCY DEPT VISIT HI MDM: CPT

## 2019-01-13 PROCEDURE — 93010 ELECTROCARDIOGRAM REPORT: CPT | Performed by: INTERNAL MEDICINE

## 2019-01-13 PROCEDURE — 80053 COMPREHEN METABOLIC PANEL: CPT | Performed by: PHYSICIAN ASSISTANT

## 2019-01-13 PROCEDURE — 81003 URINALYSIS AUTO W/O SCOPE: CPT | Performed by: PHYSICIAN ASSISTANT

## 2019-01-13 PROCEDURE — 84484 ASSAY OF TROPONIN QUANT: CPT | Performed by: PHYSICIAN ASSISTANT

## 2019-01-13 PROCEDURE — 93005 ELECTROCARDIOGRAM TRACING: CPT | Performed by: PHYSICIAN ASSISTANT

## 2019-01-13 PROCEDURE — 71045 X-RAY EXAM CHEST 1 VIEW: CPT

## 2019-01-13 PROCEDURE — 70450 CT HEAD/BRAIN W/O DYE: CPT | Performed by: RADIOLOGY

## 2019-01-13 PROCEDURE — 85025 COMPLETE CBC W/AUTO DIFF WBC: CPT | Performed by: PHYSICIAN ASSISTANT

## 2019-01-13 PROCEDURE — 71045 X-RAY EXAM CHEST 1 VIEW: CPT | Performed by: RADIOLOGY

## 2019-01-13 PROCEDURE — 96374 THER/PROPH/DIAG INJ IV PUSH: CPT

## 2019-01-13 PROCEDURE — 25010000002 PROCHLORPERAZINE EDISYLATE PER 10 MG: Performed by: PHYSICIAN ASSISTANT

## 2019-01-13 PROCEDURE — 70450 CT HEAD/BRAIN W/O DYE: CPT

## 2019-01-13 RX ORDER — ASPIRIN 81 MG/1
324 TABLET, CHEWABLE ORAL ONCE
Status: COMPLETED | OUTPATIENT
Start: 2019-01-13 | End: 2019-01-13

## 2019-01-13 RX ORDER — LISINOPRIL 20 MG/1
20 TABLET ORAL DAILY
COMMUNITY

## 2019-01-13 RX ORDER — SODIUM CHLORIDE 0.9 % (FLUSH) 0.9 %
10 SYRINGE (ML) INJECTION AS NEEDED
Status: DISCONTINUED | OUTPATIENT
Start: 2019-01-13 | End: 2019-01-14 | Stop reason: HOSPADM

## 2019-01-13 RX ORDER — ALPRAZOLAM 1 MG/1
1 TABLET ORAL 2 TIMES DAILY PRN
COMMUNITY

## 2019-01-13 RX ADMIN — PROCHLORPERAZINE EDISYLATE 10 MG: 5 INJECTION INTRAMUSCULAR; INTRAVENOUS at 23:19

## 2019-01-13 RX ADMIN — ASPIRIN 324 MG: 81 TABLET, CHEWABLE ORAL at 21:33

## 2019-01-14 VITALS
DIASTOLIC BLOOD PRESSURE: 95 MMHG | TEMPERATURE: 97.9 F | WEIGHT: 106 LBS | OXYGEN SATURATION: 97 % | SYSTOLIC BLOOD PRESSURE: 149 MMHG | HEART RATE: 69 BPM | BODY MASS INDEX: 20.01 KG/M2 | HEIGHT: 61 IN | RESPIRATION RATE: 20 BRPM

## 2019-01-14 NOTE — ED PROVIDER NOTES
Subjective     History provided by:  Patient  Chest Pain   Pain location:  Substernal area  Pain quality: pressure and throbbing    Pain radiates to:  Does not radiate  Pain severity:  Mild  Onset quality:  Sudden  Duration:  1 day  Timing:  Intermittent  Progression:  Waxing and waning  Chronicity:  New  Context: at rest    Relieved by:  Nothing  Ineffective treatments:  None tried  Associated symptoms: no abdominal pain and no fever    Associated symptoms comment:  Hypertension.  Patient states that her blood pressure became elevated and so she doubled up on her lisinopril.  Upon arrival blood pressure was not in hypertensive emergency.  Patient is complaining of headache and some vision changes.    Risk factors: hypertension and smoking        Review of Systems   Constitutional: Negative.  Negative for fever.   HENT: Negative.    Respiratory: Negative.    Cardiovascular: Positive for chest pain.   Gastrointestinal: Negative.  Negative for abdominal pain.   Endocrine: Negative.    Genitourinary: Negative.  Negative for dysuria.   Skin: Negative.    Neurological: Negative.    Psychiatric/Behavioral: Negative.    All other systems reviewed and are negative.      Past Medical History:   Diagnosis Date   • COPD (chronic obstructive pulmonary disease) (CMS/[facility])    • Hyperlipidemia    • Hypertension        No Known Allergies    History reviewed. No pertinent surgical history.    Family History   Problem Relation Age of Onset   • No Known Problems Mother    • No Known Problems Father    • No Known Problems Sister    • No Known Problems Brother    • No Known Problems Son    • No Known Problems Daughter    • No Known Problems Maternal Grandmother    • No Known Problems Maternal Grandfather    • No Known Problems Paternal Grandmother    • No Known Problems Paternal Grandfather    • No Known Problems Cousin    • Rheum arthritis Neg Hx    • Osteoarthritis Neg Hx    • Asthma Neg Hx    • Diabetes Neg Hx    • Heart failure Neg  Hx    • Hyperlipidemia Neg Hx    • Hypertension Neg Hx    • Migraines Neg Hx    • Rashes / Skin problems Neg Hx    • Seizures Neg Hx    • Stroke Neg Hx    • Thyroid disease Neg Hx        Social History     Socioeconomic History   • Marital status:      Spouse name: Not on file   • Number of children: Not on file   • Years of education: Not on file   • Highest education level: Not on file   Tobacco Use   • Smoking status: Current Every Day Smoker     Packs/day: 1.00     Types: Cigarettes   • Smokeless tobacco: Never Used   Substance and Sexual Activity   • Alcohol use: No   • Drug use: No   • Sexual activity: Defer           Objective   Physical Exam   Constitutional: She is oriented to person, place, and time. She appears well-developed and well-nourished. No distress.   HENT:   Head: Normocephalic and atraumatic.   Right Ear: External ear normal.   Left Ear: External ear normal.   Nose: Nose normal.   Eyes: Conjunctivae and EOM are normal. Pupils are equal, round, and reactive to light.   Neck: Normal range of motion. Neck supple. No JVD present. No tracheal deviation present.   Cardiovascular: Normal rate, regular rhythm and normal heart sounds.   No murmur heard.  Pulmonary/Chest: Effort normal and breath sounds normal. No respiratory distress. She has no wheezes.   Abdominal: Soft. Bowel sounds are normal. There is no tenderness.   Musculoskeletal: Normal range of motion. She exhibits no edema or deformity.   Neurological: She is alert and oriented to person, place, and time. No cranial nerve deficit.   Skin: Skin is warm and dry. No rash noted. She is not diaphoretic. No erythema. No pallor.   Psychiatric: She has a normal mood and affect. Her behavior is normal. Thought content normal.   Nursing note and vitals reviewed.      Procedures           ED Course  ED Course as of Jan 14 0001   Sun Jan 13, 2019   2200 EKG interpreted by Dr. Clifton: Normal sinus rhythm and normal EKG heart rate of 67.  [RB]    2246 Chest x-ray interpreted by Dr. Abernathy: No apparent acute cardiopulmonary disease.  [RB]   2246 CT head interpreted by virtual radiology: No acute intracranial hemorrhage or acute intracranial CT abdomen rowdy.  [RB]   2357 Patient will be set up with outpatient stress test or performed.  [RB]      ED Course User Index  [RB] Brock Poon PA                  MDM  Number of Diagnoses or Management Options  Angina at rest (CMS/HCC): new and requires workup  Essential hypertension: established and worsening     Amount and/or Complexity of Data Reviewed  Clinical lab tests: ordered and reviewed  Tests in the radiology section of CPT®: ordered and reviewed  Discuss the patient with other providers: yes    Risk of Complications, Morbidity, and/or Mortality  Presenting problems: moderate  Diagnostic procedures: moderate  Management options: low    Patient Progress  Patient progress: stable        Final diagnoses:   Essential hypertension   Angina at rest (CMS/HCC)            Brock Poon PA  01/14/19 0001

## 2019-01-15 ENCOUNTER — TRANSCRIBE ORDERS (OUTPATIENT)
Dept: ADMINISTRATIVE | Facility: HOSPITAL | Age: 69
End: 2019-01-15

## 2019-01-15 DIAGNOSIS — R07.9 CHEST PAIN, UNSPECIFIED TYPE: Primary | ICD-10-CM

## 2019-01-18 ENCOUNTER — APPOINTMENT (OUTPATIENT)
Dept: CARDIOLOGY | Facility: HOSPITAL | Age: 69
End: 2019-01-18

## 2019-03-29 ENCOUNTER — HOSPITAL ENCOUNTER (OUTPATIENT)
Dept: MAMMOGRAPHY | Facility: HOSPITAL | Age: 69
Discharge: HOME | End: 2019-03-29
Admitting: INTERNAL MEDICINE

## 2019-03-29 DIAGNOSIS — Z12.39 SCREENING BREAST EXAMINATION: ICD-10-CM

## 2019-03-29 PROCEDURE — 77067 SCR MAMMO BI INCL CAD: CPT

## 2019-03-29 PROCEDURE — 77063 BREAST TOMOSYNTHESIS BI: CPT | Performed by: RADIOLOGY

## 2019-03-29 PROCEDURE — 77063 BREAST TOMOSYNTHESIS BI: CPT

## 2019-03-29 PROCEDURE — 77067 SCR MAMMO BI INCL CAD: CPT | Performed by: RADIOLOGY

## 2020-02-22 ENCOUNTER — NURSE TRIAGE (OUTPATIENT)
Dept: CALL CENTER | Facility: HOSPITAL | Age: 70
End: 2020-02-22

## 2020-02-22 NOTE — TELEPHONE ENCOUNTER
"    Reason for Disposition  • [1] Unable to urinate (or only a few drops) > 4 hours AND     [2] bladder feels very full (e.g., palpable bladder or strong urge to urinate)    Additional Information  • Negative: Shock suspected (e.g., cold/pale/clammy skin, too weak to stand, low BP, rapid pulse)  • Negative: Sounds like a life-threatening emergency to the triager  • Negative: Followed a genital area injury  • Negative: Followed a genital area injury (penis, scrotum)  • Negative: Vaginal discharge  • Negative: Pus (white, yellow) or bloody discharge from end of penis  • Negative: [1] Taking antibiotic for urinary tract infection (UTI) AND [2] female  • Negative: [1] Taking antibiotic for urinary tract infection (UTI) AND [2] male  • Negative: [1] Discomfort (pain, burning or stinging) when passing urine AND [2] pregnant  • Negative: [1] Discomfort (pain, burning or stinging) when passing urine AND [2] postpartum (from 0 to 6 weeks after delivery)  • Negative: [1] Discomfort (pain, burning or stinging) when passing urine AND [2] female  • Negative: [1] Discomfort (pain, burning or stinging) when passing urine AND [2] male  • Negative: Pain or itching in the vulvar area  • Negative: Pain in scrotum is main symptom  • Negative: Blood in the urine is main symptom  • Negative: Symptoms arising from use of a urinary catheter (Garcia or Coude)    Answer Assessment - Initial Assessment Questions  1. SYMPTOM: \"What's the main symptom you're concerned about?\" (e.g., frequency, incontinence)      Incontinence   2. ONSET: \"When did the  This problem  start?\"      1 month   3. PAIN: \"Is there any pain?\" If so, ask: \"How bad is it?\" (Scale: 1-10; mild, moderate, severe)      Constant pain   4. CAUSE: \"What do you think is causing the symptoms?\"      Unsure   5. OTHER SYMPTOMS: \"Do you have any other symptoms?\" (e.g., fever, flank pain, blood in urine, pain with urination)      Pain with urination; blood in urine   6. PREGNANCY: \"Is " "there any chance you are pregnant?\" \"When was your last menstrual period?\"      No    Protocols used: URINARY SYMPTOMS-ADULT-AH      "

## 2020-09-29 ENCOUNTER — TRANSCRIBE ORDERS (OUTPATIENT)
Dept: ADMINISTRATIVE | Facility: HOSPITAL | Age: 70
End: 2020-09-29

## 2020-09-29 DIAGNOSIS — Z11.59 SPECIAL SCREENING EXAMINATION FOR UNSPECIFIED VIRAL DISEASE: Primary | ICD-10-CM

## 2021-04-26 ENCOUNTER — APPOINTMENT (OUTPATIENT)
Dept: MAMMOGRAPHY | Facility: HOSPITAL | Age: 71
End: 2021-04-26

## 2021-06-21 ENCOUNTER — NURSE TRIAGE (OUTPATIENT)
Dept: CALL CENTER | Facility: HOSPITAL | Age: 71
End: 2021-06-21

## 2021-06-22 NOTE — TELEPHONE ENCOUNTER
"States her toenail is trying to come off. States it doesn't hurt or anything but wants to know if her PCP will take it off or if she has to go to a foot specialist?     Explained some providers do procedure in office and some refer out. Would need to check with PCP office in AM to see if they do this procedure or if she would need to go somewhere else. She is agreeable.     Reason for Disposition  • Caller requesting an appointment, triage offered and declined    Additional Information  • Negative: Lab calling with strep throat test results and triager can call in prescription  • Negative: Lab calling with urinalysis test results and triager can call in prescription  • Negative: Medication questions  • Negative: ED call to PCP  • Negative: Physician call to PCP  • Negative: Call about patient who is currently hospitalized  • Negative: Lab or radiology calling with CRITICAL test results  • Negative: [1] Prescription not at pharmacy AND [2] was prescribed today by PCP  • Negative: [1] Follow-up call from patient regarding patient's clinical status AND [2] information urgent  • Negative: [1] Caller requests to speak ONLY to PCP AND [2] URGENT question  • Negative: [1] Caller requests to speak to PCP now AND [2] won't tell us reason for call  (Exception: if 10 pm to 6 am, caller must first discuss reason for the call)  • Negative: Notification of hospital admission  • Negative: Notification of death  • Negative: Caller requesting lab results  • Negative: Lab or radiology calling with test results  • Negative: [1] Follow-up call from patient regarding patient's clinical status AND [2] information NON-URGENT  • Negative: [1] Caller requests to speak ONLY to PCP AND [2] NON-URGENT question    Answer Assessment - Initial Assessment Questions  1. REASON FOR CALL or QUESTION: \"What is your reason for calling today?\" or \"How can I best  help you?\" or \"What question do you have that I can help answer?\"      See note  2. CALLER: " Document the source of call. (e.g., laboratory, patient).      patient    Protocols used: PCP CALL - NO TRIAGE-ADULT-

## 2022-04-26 ENCOUNTER — OFFICE VISIT (OUTPATIENT)
Dept: GASTROENTEROLOGY | Facility: CLINIC | Age: 72
End: 2022-04-26

## 2022-04-26 ENCOUNTER — OFFICE VISIT (OUTPATIENT)
Dept: UROLOGY | Facility: CLINIC | Age: 72
End: 2022-04-26

## 2022-04-26 VITALS
SYSTOLIC BLOOD PRESSURE: 104 MMHG | HEIGHT: 61 IN | DIASTOLIC BLOOD PRESSURE: 61 MMHG | HEART RATE: 70 BPM | BODY MASS INDEX: 17.75 KG/M2 | WEIGHT: 94 LBS

## 2022-04-26 VITALS — WEIGHT: 94 LBS | HEIGHT: 61 IN | BODY MASS INDEX: 17.75 KG/M2

## 2022-04-26 DIAGNOSIS — R63.0 LOSS OF APPETITE: ICD-10-CM

## 2022-04-26 DIAGNOSIS — R63.4 WEIGHT LOSS: Primary | ICD-10-CM

## 2022-04-26 DIAGNOSIS — N39.46 MIXED STRESS AND URGE URINARY INCONTINENCE: Primary | ICD-10-CM

## 2022-04-26 DIAGNOSIS — R35.0 FREQUENCY OF MICTURITION: ICD-10-CM

## 2022-04-26 DIAGNOSIS — R10.30 LOWER ABDOMINAL PAIN: ICD-10-CM

## 2022-04-26 DIAGNOSIS — R30.0 DYSURIA: ICD-10-CM

## 2022-04-26 LAB
BILIRUB BLD-MCNC: NEGATIVE MG/DL
CLARITY, POC: ABNORMAL
COLOR UR: ABNORMAL
EXPIRATION DATE: ABNORMAL
GLUCOSE UR STRIP-MCNC: NEGATIVE MG/DL
KETONES UR QL: NEGATIVE
LEUKOCYTE EST, POC: ABNORMAL
Lab: ABNORMAL
NITRITE UR-MCNC: NEGATIVE MG/ML
PH UR: 5 [PH] (ref 5–8)
PROT UR STRIP-MCNC: NEGATIVE MG/DL
RBC # UR STRIP: NEGATIVE /UL
SP GR UR: 1.01 (ref 1–1.03)
UROBILINOGEN UR QL: NORMAL

## 2022-04-26 PROCEDURE — 99204 OFFICE O/P NEW MOD 45 MIN: CPT | Performed by: NURSE PRACTITIONER

## 2022-04-26 PROCEDURE — 99214 OFFICE O/P EST MOD 30 MIN: CPT | Performed by: PHYSICIAN ASSISTANT

## 2022-04-26 PROCEDURE — 51798 US URINE CAPACITY MEASURE: CPT | Performed by: NURSE PRACTITIONER

## 2022-04-26 PROCEDURE — 87186 SC STD MICRODIL/AGAR DIL: CPT | Performed by: NURSE PRACTITIONER

## 2022-04-26 PROCEDURE — 81003 URINALYSIS AUTO W/O SCOPE: CPT | Performed by: NURSE PRACTITIONER

## 2022-04-26 PROCEDURE — 87086 URINE CULTURE/COLONY COUNT: CPT | Performed by: NURSE PRACTITIONER

## 2022-04-26 PROCEDURE — 87077 CULTURE AEROBIC IDENTIFY: CPT | Performed by: NURSE PRACTITIONER

## 2022-04-26 RX ORDER — OXYBUTYNIN CHLORIDE 5 MG/1
5 TABLET ORAL 2 TIMES DAILY
COMMUNITY
Start: 2022-04-04 | End: 2022-04-26

## 2022-04-26 RX ORDER — MELOXICAM 7.5 MG/1
7.5 TABLET ORAL
COMMUNITY
Start: 2022-03-04

## 2022-04-26 RX ORDER — VIBEGRON 75 MG/1
1 TABLET, FILM COATED ORAL NIGHTLY
Qty: 30 TABLET | Refills: 3 | COMMUNITY
Start: 2022-04-26 | End: 2022-05-24 | Stop reason: SDUPTHER

## 2022-04-26 NOTE — PROGRESS NOTES
"Chief Complaint   Patient presents with   • Weight Loss       Dorothy Cespedes is a 71 y.o. female who presents to the office today for evaluation of Weight Loss  .    HPI  Patient presents to the clinic today for evaluation of weight loss.  Patient states that for the last 3 to 4 months she has been suffering with no appetite which has resulted in her losing weight.  Patient states that she originally started around 104 pounds and has now decreased down to 94.  She has been taking several different vitamin supplements as well as doing Ensure Plus.  She states that her main problem has really been not having any appetite-primary care did place her on what is believed to be Megace however discontinued use due to \" medication resulting in cancer with long-term use\" according the patient.  She does not experience any abdominal pain or bloating.  She is having normal daily bowel movements that she states range from type III-IV on the [place] stool scale.  She has had a colonoscopy within the past couple years and states it was completely normal and does not want to have that procedure performed again.  Patient denies any family history of colon cancer.  Denies any bright red blood per rectum or melena.  She is currently following with urology as well due to urinary incontinence.  Review of Systems   Constitutional: Positive for appetite change and unexpected weight change.   HENT: Negative for trouble swallowing.    Eyes: Negative.    Respiratory: Negative.    Cardiovascular: Negative.    Gastrointestinal: Positive for abdominal distention, abdominal pain, constipation and nausea. Negative for anal bleeding, blood in stool, diarrhea and vomiting.   Endocrine: Negative.    Genitourinary: Positive for frequency. Negative for difficulty urinating.   Musculoskeletal: Negative.    Skin: Negative.    Allergic/Immunologic: Negative.    Neurological: Negative for headaches.   Hematological: Bruises/bleeds easily. "   Psychiatric/Behavioral: Negative.        ACTIVE PROBLEMS:   Specialty Problems    None         PAST MEDICAL HISTORY:  Past Medical History:   Diagnosis Date   • COPD (chronic obstructive pulmonary disease) (HCC)    • High cholesterol    • Hyperlipidemia    • Hypertension        SURGICAL HISTORY:  Past Surgical History:   Procedure Laterality Date   • BREAST BIOPSY Right     yrs ago benign       FAMILY HISTORY:  Family History   Problem Relation Age of Onset   • No Known Problems Mother    • No Known Problems Father    • No Known Problems Sister    • No Known Problems Brother    • No Known Problems Son    • No Known Problems Daughter    • No Known Problems Maternal Grandmother    • No Known Problems Maternal Grandfather    • No Known Problems Paternal Grandmother    • No Known Problems Paternal Grandfather    • No Known Problems Cousin    • Rheum arthritis Neg Hx    • Osteoarthritis Neg Hx    • Asthma Neg Hx    • Diabetes Neg Hx    • Heart failure Neg Hx    • Hyperlipidemia Neg Hx    • Hypertension Neg Hx    • Migraines Neg Hx    • Rashes / Skin problems Neg Hx    • Seizures Neg Hx    • Stroke Neg Hx    • Thyroid disease Neg Hx    • Breast cancer Neg Hx        SOCIAL HISTORY:  Social History     Tobacco Use   • Smoking status: Current Every Day Smoker     Packs/day: 1.00     Types: Cigarettes   • Smokeless tobacco: Never Used   Substance Use Topics   • Alcohol use: No       CURRENT MEDICATION:    Current Outpatient Medications:   •  ALPRAZolam (XANAX) 1 MG tablet, Take 1 mg by mouth 2 (Two) Times a Day As Needed for Anxiety., Disp: , Rfl:   •  aspirin 81 MG chewable tablet, Chew 81 mg Daily., Disp: , Rfl:   •  gabapentin (NEURONTIN) 800 MG tablet, Take 800 mg by mouth 3 (Three) Times a Day., Disp: , Rfl:   •  HYDROcodone-acetaminophen (NORCO) 7.5-325 MG per tablet, Take 1 tablet by mouth Every 8 (Eight) Hours As Needed for Moderate Pain (4-6)., Disp: , Rfl:   •  lisinopril (PRINIVIL,ZESTRIL) 20 MG tablet, Take 20  "mg by mouth Daily., Disp: , Rfl:   •  meloxicam (MOBIC) 7.5 MG tablet, 7.5 mg., Disp: , Rfl:   •  NON FORMULARY, Daily. Cholesterol pill, Disp: , Rfl:   •  NON FORMULARY, Daily. bp med, Disp: , Rfl:   •  Vibegron (Gemtesa) 75 MG tablet, Take 1 tablet by mouth Every Night for 30 days., Disp: 30 tablet, Rfl: 3    ALLERGIES:  Patient has no known allergies.    VISIT VITALS:  /61 (BP Location: Left arm, Patient Position: Sitting, Cuff Size: Adult)   Pulse 70   Ht 154.9 cm (61\")   Wt 42.6 kg (94 lb)   BMI 17.76 kg/m²   Physical Exam  Constitutional:       General: She is not in acute distress.     Appearance: Normal appearance. She is well-developed.   HENT:      Head: Normocephalic and atraumatic.   Eyes:      Pupils: Pupils are equal, round, and reactive to light.   Cardiovascular:      Rate and Rhythm: Normal rate and regular rhythm.      Heart sounds: Normal heart sounds.   Pulmonary:      Effort: Pulmonary effort is normal. No respiratory distress.      Breath sounds: Normal breath sounds. No wheezing, rhonchi or rales.   Abdominal:      General: Abdomen is flat. Bowel sounds are normal. There is no distension.      Palpations: Abdomen is soft. There is no mass.      Tenderness: There is no abdominal tenderness. There is no guarding or rebound.      Hernia: No hernia is present.   Musculoskeletal:         General: No swelling. Normal range of motion.      Cervical back: Normal range of motion and neck supple.      Right lower leg: No edema.      Left lower leg: No edema.   Skin:     General: Skin is warm and dry.   Neurological:      Mental Status: She is alert and oriented to person, place, and time.   Psychiatric:         Attention and Perception: Attention normal.         Mood and Affect: Mood normal.         Speech: Speech normal.         Behavior: Behavior normal. Behavior is cooperative.         Thought Content: Thought content normal.         Assessment/Plan   Due to the patient's symptoms-I will go " ahead and get her scheduled to have a EGD performed by Dr. Parker.  The procedure was thoroughly explained to the patient she voiced understanding and agreement to the treatment plan.  She is declining colonoscopy at this time due to the fact she had 1 done a few years ago and results were normal.   Diagnosis Plan   1. Weight loss  Case Request    Case Request   2. Loss of appetite  Case Request    Case Request       Return After procedure.                   This document has been electronically signed by Mitchell Bernardo PA-C  April 27, 2022 09:23 EDT    Part of this note may be an electronic transcription/translation of spoken language to printed text using the Dragon Dictation System.
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
## 2022-04-28 DIAGNOSIS — R63.0 LOSS OF APPETITE: Primary | ICD-10-CM

## 2022-04-28 DIAGNOSIS — R63.4 WEIGHT LOSS: ICD-10-CM

## 2022-04-28 LAB — BACTERIA SPEC AEROBE CULT: ABNORMAL

## 2022-04-29 ENCOUNTER — TELEPHONE (OUTPATIENT)
Dept: UROLOGY | Facility: CLINIC | Age: 72
End: 2022-04-29

## 2022-04-29 DIAGNOSIS — N39.0 E-COLI UTI: ICD-10-CM

## 2022-04-29 DIAGNOSIS — R30.0 DYSURIA: Primary | ICD-10-CM

## 2022-04-29 DIAGNOSIS — R35.0 FREQUENCY OF MICTURITION: ICD-10-CM

## 2022-04-29 DIAGNOSIS — B96.20 E-COLI UTI: ICD-10-CM

## 2022-04-29 RX ORDER — NITROFURANTOIN 25; 75 MG/1; MG/1
CAPSULE ORAL
Qty: 56 CAPSULE | Refills: 3 | Status: SHIPPED | OUTPATIENT
Start: 2022-04-29

## 2022-04-29 NOTE — TELEPHONE ENCOUNTER
Called patient to check on her post clinic vist and to discuss urin culture positive. Sent in macrobid to her pharmarcy to take as directed. Unable to leave message.    Urine Culture 50,000 CFU/mL Escherichia coli Abnormal             Resulting Agency: [facility]       Susceptibility     Escherichia coli     JOSHUA     Ampicillin Resistant     Ampicillin + Sulbactam Susceptible     Cefazolin Susceptible     Cefepime Susceptible     Ceftazidime Susceptible     Ceftriaxone Susceptible     Gentamicin Susceptible     Levofloxacin Susceptible     Nitrofurantoin Susceptible     Piperacillin + Tazobactam Susceptible     Trimethoprim + Sulfamethoxazole Susceptible

## 2022-05-02 ENCOUNTER — TELEPHONE (OUTPATIENT)
Dept: UROLOGY | Facility: CLINIC | Age: 72
End: 2022-05-02

## 2022-05-02 NOTE — TELEPHONE ENCOUNTER
Spoke to patient told her new antibiotic has been sent in she voiced understanding and said she would go get it

## 2022-05-02 NOTE — TELEPHONE ENCOUNTER
----- Message from Griselda Cheng-Akwa, APRN sent at 4/29/2022 12:08 PM EDT -----  Regarding: ecoli uti    Called patient to check on her post clinic vist and to discuss urin culture positive. Sent in macrobid to her pharmarcy to take as directed. Unable to leave message.    Urine Culture   50,000 CFU/mL Escherichia coli Abnormal          Resulting Agency: [facility]      Susceptibility       Escherichia coli    JOSHUA    Ampicillin Resistant    Ampicillin + Sulbactam Susceptible    Cefazolin Susceptible    Cefepime Susceptible    Ceftazidime Susceptible    Ceftriaxone Susceptible    Gentamicin Susceptible    Levofloxacin Susceptible    Nitrofurantoin Susceptible    Piperacillin + Tazobactam Susceptible    Trimethoprim + Sulfamethoxazole Susceptible           ----- Message -----  From: Roddy Rodgers MA  Sent: 4/26/2022   2:46 PM EDT  To: Griselda Cheng-Akwa, APRN

## 2022-05-03 ENCOUNTER — TELEPHONE (OUTPATIENT)
Dept: UROLOGY | Facility: CLINIC | Age: 72
End: 2022-05-03

## 2022-05-03 PROBLEM — R63.4 WEIGHT LOSS: Status: ACTIVE | Noted: 2022-05-03

## 2022-05-03 PROBLEM — R63.0 LOSS OF APPETITE: Status: ACTIVE | Noted: 2022-05-03

## 2022-05-03 NOTE — TELEPHONE ENCOUNTER
The patient called and said that this morning when she voided her urine that it was light green and as the day progresses its getting more and more green and was wondering with her new meds of Gemtesa and Macrobid was that normal. I told her I did not think that would cause that and I spoke with Alex and Jhon both and they definitely wanted her to give another urine for culture because although we know she has E Coli in her urine right now it could be growing some other type of infection which could be causing her urine to be green. She is seeing her PCP tomorrow and said they would try and get a uc then and if not would come by here and give one.

## 2022-05-04 ENCOUNTER — TELEPHONE (OUTPATIENT)
Dept: GASTROENTEROLOGY | Facility: CLINIC | Age: 72
End: 2022-05-04

## 2022-05-04 DIAGNOSIS — Z12.11 ENCOUNTER FOR COLORECTAL CANCER SCREENING: Primary | ICD-10-CM

## 2022-05-04 DIAGNOSIS — Z12.12 ENCOUNTER FOR COLORECTAL CANCER SCREENING: Primary | ICD-10-CM

## 2022-05-04 RX ORDER — BISACODYL 5 MG
TABLET, DELAYED RELEASE (ENTERIC COATED) ORAL
Qty: 4 TABLET | Refills: 0 | Status: SHIPPED | OUTPATIENT
Start: 2022-05-04 | End: 2022-05-10 | Stop reason: HOSPADM

## 2022-05-04 NOTE — TELEPHONE ENCOUNTER
Dr. Parker, patient called and asked if it is ok to add a Colonoscopy to her EGD that she is scheduled to have on 5-10-22? She said it has been 13 years since she has had her last one.

## 2022-05-05 NOTE — TELEPHONE ENCOUNTER
Spoke with patient and went over instructions with her for her to have a Colonoscopy. She voiced understanding

## 2022-05-06 ENCOUNTER — LAB (OUTPATIENT)
Dept: LAB | Facility: HOSPITAL | Age: 72
End: 2022-05-06

## 2022-05-06 DIAGNOSIS — R63.4 WEIGHT LOSS: ICD-10-CM

## 2022-05-06 DIAGNOSIS — R63.0 LOSS OF APPETITE: ICD-10-CM

## 2022-05-06 LAB — SARS-COV-2 RNA PNL SPEC NAA+PROBE: NOT DETECTED

## 2022-05-06 PROCEDURE — U0004 COV-19 TEST NON-CDC HGH THRU: HCPCS | Performed by: PHYSICIAN ASSISTANT

## 2022-05-10 ENCOUNTER — ANESTHESIA (OUTPATIENT)
Dept: PERIOP | Facility: HOSPITAL | Age: 72
End: 2022-05-10

## 2022-05-10 ENCOUNTER — HOSPITAL ENCOUNTER (OUTPATIENT)
Facility: HOSPITAL | Age: 72
Setting detail: HOSPITAL OUTPATIENT SURGERY
Discharge: HOME | End: 2022-05-10
Attending: INTERNAL MEDICINE | Admitting: INTERNAL MEDICINE

## 2022-05-10 ENCOUNTER — ANESTHESIA EVENT (OUTPATIENT)
Dept: PERIOP | Facility: HOSPITAL | Age: 72
End: 2022-05-10

## 2022-05-10 VITALS
HEIGHT: 61 IN | HEART RATE: 76 BPM | WEIGHT: 95 LBS | DIASTOLIC BLOOD PRESSURE: 82 MMHG | RESPIRATION RATE: 18 BRPM | BODY MASS INDEX: 17.94 KG/M2 | SYSTOLIC BLOOD PRESSURE: 121 MMHG | TEMPERATURE: 97 F | OXYGEN SATURATION: 95 %

## 2022-05-10 DIAGNOSIS — R63.4 WEIGHT LOSS: ICD-10-CM

## 2022-05-10 DIAGNOSIS — R63.0 LOSS OF APPETITE: ICD-10-CM

## 2022-05-10 PROCEDURE — 25010000002 PHENYLEPHRINE 10 MG/ML SOLUTION: Performed by: NURSE ANESTHETIST, CERTIFIED REGISTERED

## 2022-05-10 PROCEDURE — 45378 DIAGNOSTIC COLONOSCOPY: CPT | Performed by: INTERNAL MEDICINE

## 2022-05-10 PROCEDURE — 25010000002 PROPOFOL 10 MG/ML EMULSION: Performed by: NURSE ANESTHETIST, CERTIFIED REGISTERED

## 2022-05-10 PROCEDURE — 88305 TISSUE EXAM BY PATHOLOGIST: CPT

## 2022-05-10 PROCEDURE — 43239 EGD BIOPSY SINGLE/MULTIPLE: CPT | Performed by: INTERNAL MEDICINE

## 2022-05-10 RX ORDER — LIDOCAINE HYDROCHLORIDE 20 MG/ML
INJECTION, SOLUTION INFILTRATION; PERINEURAL AS NEEDED
Status: DISCONTINUED | OUTPATIENT
Start: 2022-05-10 | End: 2022-05-10 | Stop reason: SURG

## 2022-05-10 RX ORDER — FENTANYL CITRATE 50 UG/ML
50 INJECTION, SOLUTION INTRAMUSCULAR; INTRAVENOUS
Status: DISCONTINUED | OUTPATIENT
Start: 2022-05-10 | End: 2022-05-10 | Stop reason: HOSPADM

## 2022-05-10 RX ORDER — SODIUM CHLORIDE 0.9 % (FLUSH) 0.9 %
10 SYRINGE (ML) INJECTION EVERY 12 HOURS SCHEDULED
Status: DISCONTINUED | OUTPATIENT
Start: 2022-05-10 | End: 2022-05-10 | Stop reason: HOSPADM

## 2022-05-10 RX ORDER — KETOROLAC TROMETHAMINE 30 MG/ML
15 INJECTION, SOLUTION INTRAMUSCULAR; INTRAVENOUS EVERY 6 HOURS PRN
Status: DISCONTINUED | OUTPATIENT
Start: 2022-05-10 | End: 2022-05-10 | Stop reason: HOSPADM

## 2022-05-10 RX ORDER — SODIUM CHLORIDE 0.9 % (FLUSH) 0.9 %
10 SYRINGE (ML) INJECTION AS NEEDED
Status: DISCONTINUED | OUTPATIENT
Start: 2022-05-10 | End: 2022-05-10 | Stop reason: HOSPADM

## 2022-05-10 RX ORDER — PROPOFOL 10 MG/ML
VIAL (ML) INTRAVENOUS AS NEEDED
Status: DISCONTINUED | OUTPATIENT
Start: 2022-05-10 | End: 2022-05-10 | Stop reason: SURG

## 2022-05-10 RX ORDER — OXYCODONE HYDROCHLORIDE AND ACETAMINOPHEN 5; 325 MG/1; MG/1
1 TABLET ORAL ONCE AS NEEDED
Status: DISCONTINUED | OUTPATIENT
Start: 2022-05-10 | End: 2022-05-10 | Stop reason: HOSPADM

## 2022-05-10 RX ORDER — MIDAZOLAM HYDROCHLORIDE 1 MG/ML
0.5 INJECTION INTRAMUSCULAR; INTRAVENOUS
Status: DISCONTINUED | OUTPATIENT
Start: 2022-05-10 | End: 2022-05-10 | Stop reason: HOSPADM

## 2022-05-10 RX ORDER — ONDANSETRON 2 MG/ML
4 INJECTION INTRAMUSCULAR; INTRAVENOUS AS NEEDED
Status: DISCONTINUED | OUTPATIENT
Start: 2022-05-10 | End: 2022-05-10 | Stop reason: HOSPADM

## 2022-05-10 RX ORDER — MEPERIDINE HYDROCHLORIDE 25 MG/ML
12.5 INJECTION INTRAMUSCULAR; INTRAVENOUS; SUBCUTANEOUS
Status: DISCONTINUED | OUTPATIENT
Start: 2022-05-10 | End: 2022-05-10 | Stop reason: HOSPADM

## 2022-05-10 RX ORDER — PHENYLEPHRINE HYDROCHLORIDE 10 MG/ML
INJECTION INTRAVENOUS AS NEEDED
Status: DISCONTINUED | OUTPATIENT
Start: 2022-05-10 | End: 2022-05-10 | Stop reason: SURG

## 2022-05-10 RX ORDER — IPRATROPIUM BROMIDE AND ALBUTEROL SULFATE 2.5; .5 MG/3ML; MG/3ML
3 SOLUTION RESPIRATORY (INHALATION) ONCE AS NEEDED
Status: DISCONTINUED | OUTPATIENT
Start: 2022-05-10 | End: 2022-05-10 | Stop reason: HOSPADM

## 2022-05-10 RX ORDER — SODIUM CHLORIDE, SODIUM LACTATE, POTASSIUM CHLORIDE, CALCIUM CHLORIDE 600; 310; 30; 20 MG/100ML; MG/100ML; MG/100ML; MG/100ML
100 INJECTION, SOLUTION INTRAVENOUS ONCE AS NEEDED
Status: DISCONTINUED | OUTPATIENT
Start: 2022-05-10 | End: 2022-05-10 | Stop reason: HOSPADM

## 2022-05-10 RX ORDER — SODIUM CHLORIDE, SODIUM LACTATE, POTASSIUM CHLORIDE, CALCIUM CHLORIDE 600; 310; 30; 20 MG/100ML; MG/100ML; MG/100ML; MG/100ML
125 INJECTION, SOLUTION INTRAVENOUS ONCE
Status: COMPLETED | OUTPATIENT
Start: 2022-05-10 | End: 2022-05-10

## 2022-05-10 RX ADMIN — PROPOFOL 30 MG: 10 INJECTION, EMULSION INTRAVENOUS at 14:46

## 2022-05-10 RX ADMIN — PHENYLEPHRINE HYDROCHLORIDE 100 MCG: 10 INJECTION INTRAVENOUS at 14:55

## 2022-05-10 RX ADMIN — PHENYLEPHRINE HYDROCHLORIDE 100 MCG: 10 INJECTION INTRAVENOUS at 15:11

## 2022-05-10 RX ADMIN — LIDOCAINE HYDROCHLORIDE 60 MG: 20 INJECTION, SOLUTION INFILTRATION; PERINEURAL at 14:46

## 2022-05-10 RX ADMIN — PHENYLEPHRINE HYDROCHLORIDE 100 MCG: 10 INJECTION INTRAVENOUS at 15:03

## 2022-05-10 RX ADMIN — PROPOFOL 150 MCG/KG/MIN: 10 INJECTION, EMULSION INTRAVENOUS at 14:46

## 2022-05-10 RX ADMIN — PHENYLEPHRINE HYDROCHLORIDE 100 MCG: 10 INJECTION INTRAVENOUS at 15:22

## 2022-05-10 RX ADMIN — SODIUM CHLORIDE, POTASSIUM CHLORIDE, SODIUM LACTATE AND CALCIUM CHLORIDE: 600; 310; 30; 20 INJECTION, SOLUTION INTRAVENOUS at 14:46

## 2022-05-10 NOTE — ANESTHESIA PREPROCEDURE EVALUATION
Anesthesia Evaluation     Patient summary reviewed and Nursing notes reviewed   no history of anesthetic complications:  NPO Solid Status: > 8 hours  NPO Liquid Status: > 8 hours           Airway   Mallampati: II  TM distance: >3 FB  Neck ROM: full  Dental    (+) edentulous    Pulmonary - normal exam   (+) a smoker Current Smoked day of surgery, COPD,   Cardiovascular - normal exam    (+) hypertension, hyperlipidemia,       Neuro/Psych  (+) psychiatric history Anxiety,    GI/Hepatic/Renal/Endo - negative ROS     Musculoskeletal     Abdominal  - normal exam   Substance History - negative use     OB/GYN negative ob/gyn ROS         Other   arthritis,                    Anesthesia Plan    ASA 3     general     intravenous induction     Anesthetic plan, all risks, benefits, and alternatives have been provided, discussed and informed consent has been obtained with: patient.  Use of blood products discussed with patient  Consented to blood products.       CODE STATUS:      FULL    Lab Results   Component Value Date    WBC 12.89 (H) 01/13/2019    HGB 14.1 01/13/2019    HCT 43.8 01/13/2019    MCV 91.8 01/13/2019     01/13/2019       Lab Results   Component Value Date    GLUCOSE 81 01/13/2019    BUN 8 01/13/2019    CREATININE 0.86 01/13/2019    EGFRIFNONA 66 01/13/2019    BCR 9.3 01/13/2019    K 4.2 01/13/2019    CO2 25.6 01/13/2019    CALCIUM 9.1 01/13/2019    ALBUMIN 4.30 01/13/2019    LABIL2 1.7 12/28/2014    AST 17 01/13/2019    ALT 11 01/13/2019

## 2022-05-10 NOTE — ANESTHESIA POSTPROCEDURE EVALUATION
Patient: Dorothy Cespedes    Procedure Summary     Date: 05/10/22 Room / Location:  [facility]  /  [facility]    Anesthesia Start: 1445 Anesthesia Stop: 1525    Procedures:       ESOPHAGOGASTRODUODENOSCOPY WITH BIOPSY (N/A Esophagus)      COLONOSCOPY (N/A ) Diagnosis:       Weight loss      Loss of appetite      (Weight loss [R63.4])      (Loss of appetite [R63.0])    Surgeons: Kenia Braun MD Provider: Jayson Chowdary MD    Anesthesia Type: general ASA Status: 3          Anesthesia Type: general    Vitals  Vitals Value Taken Time   /82 05/10/22 1555   Temp 97 °F (36.1 °C) 05/10/22 1525   Pulse 76 05/10/22 1555   Resp 18 05/10/22 1555   SpO2 95 % 05/10/22 1555           Post Anesthesia Care and Evaluation    Patient location during evaluation: PACU  Patient participation: complete - patient participated  Level of consciousness: awake  Pain score: 0  Pain management: adequate  Airway patency: patent  Anesthetic complications: No anesthetic complications  PONV Status: none  Cardiovascular status: acceptable  Respiratory status: acceptable  Hydration status: acceptable

## 2022-05-11 LAB — REF LAB TEST METHOD: NORMAL

## 2022-05-13 NOTE — PROGRESS NOTES
Recently, you underwent upper endoscopy.  Biopsies of the esophagus revealed reflux esophagitis.  Biopsies of the stomach were negative for H. pylori gastritis.  Biopsies of the small bowel were negative for celiac disease.  Your colonoscopy was normal except for diverticulosis.  Please maintain a high-fiber diet.  Please keep your follow-up appointment.

## 2022-05-24 ENCOUNTER — OFFICE VISIT (OUTPATIENT)
Dept: UROLOGY | Facility: CLINIC | Age: 72
End: 2022-05-24

## 2022-05-24 ENCOUNTER — TELEPHONE (OUTPATIENT)
Dept: UROLOGY | Facility: CLINIC | Age: 72
End: 2022-05-24

## 2022-05-24 ENCOUNTER — OFFICE VISIT (OUTPATIENT)
Dept: GASTROENTEROLOGY | Facility: CLINIC | Age: 72
End: 2022-05-24

## 2022-05-24 VITALS — OXYGEN SATURATION: 94 % | HEIGHT: 61 IN | BODY MASS INDEX: 17.94 KG/M2 | HEART RATE: 65 BPM | WEIGHT: 95 LBS

## 2022-05-24 VITALS — BODY MASS INDEX: 17.94 KG/M2 | HEIGHT: 61 IN | WEIGHT: 95.02 LBS

## 2022-05-24 DIAGNOSIS — R63.0 LOSS OF APPETITE: ICD-10-CM

## 2022-05-24 DIAGNOSIS — N39.0 E-COLI UTI: ICD-10-CM

## 2022-05-24 DIAGNOSIS — R63.4 WEIGHT LOSS: ICD-10-CM

## 2022-05-24 DIAGNOSIS — R35.0 FREQUENCY OF MICTURITION: ICD-10-CM

## 2022-05-24 DIAGNOSIS — B96.20 E-COLI UTI: ICD-10-CM

## 2022-05-24 DIAGNOSIS — K21.9 GASTROESOPHAGEAL REFLUX DISEASE WITHOUT ESOPHAGITIS: Primary | ICD-10-CM

## 2022-05-24 DIAGNOSIS — N39.46 MIXED STRESS AND URGE URINARY INCONTINENCE: Primary | ICD-10-CM

## 2022-05-24 LAB
BILIRUB BLD-MCNC: NEGATIVE MG/DL
CLARITY, POC: CLEAR
COLOR UR: YELLOW
EXPIRATION DATE: NORMAL
GLUCOSE UR STRIP-MCNC: NEGATIVE MG/DL
KETONES UR QL: NEGATIVE
LEUKOCYTE EST, POC: NEGATIVE
Lab: NORMAL
NITRITE UR-MCNC: NEGATIVE MG/ML
PH UR: 6 [PH] (ref 5–8)
PROT UR STRIP-MCNC: NEGATIVE MG/DL
RBC # UR STRIP: NEGATIVE /UL
SP GR UR: 1.01 (ref 1–1.03)
UROBILINOGEN UR QL: NORMAL

## 2022-05-24 PROCEDURE — 81003 URINALYSIS AUTO W/O SCOPE: CPT | Performed by: NURSE PRACTITIONER

## 2022-05-24 PROCEDURE — 99213 OFFICE O/P EST LOW 20 MIN: CPT | Performed by: PHYSICIAN ASSISTANT

## 2022-05-24 PROCEDURE — 87086 URINE CULTURE/COLONY COUNT: CPT | Performed by: NURSE PRACTITIONER

## 2022-05-24 PROCEDURE — 99214 OFFICE O/P EST MOD 30 MIN: CPT | Performed by: NURSE PRACTITIONER

## 2022-05-24 RX ORDER — LANSOPRAZOLE 30 MG/1
30 CAPSULE, DELAYED RELEASE ORAL 2 TIMES DAILY
Qty: 60 CAPSULE | Refills: 11 | Status: SHIPPED | OUTPATIENT
Start: 2022-05-24

## 2022-05-24 RX ORDER — VIBEGRON 75 MG/1
1 TABLET, FILM COATED ORAL NIGHTLY
Qty: 30 TABLET | Refills: 3 | Status: SHIPPED | OUTPATIENT
Start: 2022-05-24 | End: 2022-05-25

## 2022-05-24 RX ORDER — MEGESTROL ACETATE 20 MG/1
20 TABLET ORAL 2 TIMES DAILY
Qty: 60 TABLET | Refills: 11 | Status: SHIPPED | OUTPATIENT
Start: 2022-05-24
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
## 2022-05-24 NOTE — TELEPHONE ENCOUNTER
I tried to do PA on Megace. When I tried it said that it was available without PA. I called the pharmacy to make sure this was correct and he said that she has already picked it up.

## 2022-05-24 NOTE — PROGRESS NOTES
Chief Complaint   Patient presents with   • Weight Loss       Dorothy Cespedes is a 71 y.o. female who presents to the office today for evaluation of Weight Loss  .    HPI   Patient presents the clinic today for follow-up of weight loss.  Patient recently underwent EGD/colonoscopy with Dr. Parker on 5/10-she is doing well since having this procedure performed.  Patient is currently taking Protonix 20 mg once daily which does not seem to be controlling any of her heartburn/reflux-she admits to having a sore throat and trouble swallowing at times.  Patient states that she may slowly be getting some of her appetite back and her weight has been holding steady.  She is also following with urology who has her on Macrobid once daily for long-term treatment of E. coli UTI.  Patient states that she is feeling better however still has a long ways to go.  She is concerned that her appetite will not get back to 100% of what it was.  Patient states she started out around 106 pounds and would like to get back to that if at all possible.  Continues doing ensures when possible and is much food throughout the day as she can handle.  EGD results:  - The examined esophagus was normal. Biopsies were taken with a cold forceps for histology. Irregular Z-line.  - A small hiatal hernia was present.  - Localized moderately erythematous mucosa without bleeding was found in the gastric body and in the gastric antrum.  Biopsies were taken with a cold forceps for histology.  - The examined duodenum was normal. Biopsies were taken with a cold forceps for histology.  Colonoscopy results:  - The perianal and digital rectal examinations were normal.  - Multiple small-mouthed diverticula were found in the sigmoid colon and descending colon.  - Internal hemorrhoids were found during retroflexion. The hemorrhoids were Grade I (internal hemorrhoids that do not  prolapse).  Review of Systems   Constitutional: Positive for appetite change and unexpected  weight change.   HENT: Negative for trouble swallowing.    Eyes: Negative.    Respiratory: Negative.    Cardiovascular: Negative.    Gastrointestinal: Positive for abdominal distention. Negative for abdominal pain, anal bleeding, blood in stool, constipation, diarrhea, nausea, rectal pain and vomiting.   Endocrine: Negative.    Genitourinary: Positive for frequency. Negative for difficulty urinating.   Musculoskeletal: Negative.    Skin: Negative.    Allergic/Immunologic: Negative.    Neurological: Negative for headaches.   Hematological: Bruises/bleeds easily.   Psychiatric/Behavioral: Negative.        ACTIVE PROBLEMS:   Specialty Problems    None         PAST MEDICAL HISTORY:  Past Medical History:   Diagnosis Date   • Arthritis    • COPD (chronic obstructive pulmonary disease) (HCC)    • Elevated cholesterol    • High cholesterol    • History of transfusion    • Hyperlipidemia    • Hypertension        SURGICAL HISTORY:  Past Surgical History:   Procedure Laterality Date   • BREAST BIOPSY Right     yrs ago benign   • COLONOSCOPY     • COLONOSCOPY N/A 5/10/2022    Procedure: COLONOSCOPY;  Surgeon: Kenia Braun MD;  Location: [facility];  Service: Gastroenterology;  Laterality: N/A;   • ENDOSCOPY     • ENDOSCOPY N/A 5/10/2022    Procedure: ESOPHAGOGASTRODUODENOSCOPY WITH BIOPSY;  Surgeon: Kenia Braun MD;  Location: [facility];  Service: Gastroenterology;  Laterality: N/A;   • HIP SURGERY Right        FAMILY HISTORY:  Family History   Problem Relation Age of Onset   • No Known Problems Mother    • No Known Problems Father    • No Known Problems Sister    • No Known Problems Brother    • No Known Problems Son    • No Known Problems Daughter    • No Known Problems Maternal Grandmother    • No Known Problems Maternal Grandfather    • No Known Problems Paternal Grandmother    • No Known Problems Paternal Grandfather    • No Known Problems Cousin    • Rheum arthritis Neg Hx    • Osteoarthritis  Neg Hx    • Asthma Neg Hx    • Diabetes Neg Hx    • Heart failure Neg Hx    • Hyperlipidemia Neg Hx    • Hypertension Neg Hx    • Migraines Neg Hx    • Rashes / Skin problems Neg Hx    • Seizures Neg Hx    • Stroke Neg Hx    • Thyroid disease Neg Hx    • Breast cancer Neg Hx        SOCIAL HISTORY:  Social History     Tobacco Use   • Smoking status: Current Every Day Smoker     Packs/day: 1.00     Years: 30.00     Pack years: 30.00     Types: Cigarettes   • Smokeless tobacco: Never Used   Substance Use Topics   • Alcohol use: No       CURRENT MEDICATION:    Current Outpatient Medications:   •  ALPRAZolam (XANAX) 1 MG tablet, Take 1 mg by mouth 2 (Two) Times a Day As Needed for Anxiety., Disp: , Rfl:   •  aspirin 81 MG chewable tablet, Chew 81 mg Daily., Disp: , Rfl:   •  gabapentin (NEURONTIN) 800 MG tablet, Take 800 mg by mouth 3 (Three) Times a Day., Disp: , Rfl:   •  HYDROcodone-acetaminophen (NORCO) 7.5-325 MG per tablet, Take 1 tablet by mouth Every 8 (Eight) Hours As Needed for Moderate Pain (4-6)., Disp: , Rfl:   •  lisinopril (PRINIVIL,ZESTRIL) 20 MG tablet, Take 20 mg by mouth Daily., Disp: , Rfl:   •  magnesium citrate solution, Take 296 mL by mouth Take As Directed. Follow bowel prep instructions given at office, Disp: 592 mL, Rfl: 0  •  meloxicam (MOBIC) 7.5 MG tablet, 7.5 mg., Disp: , Rfl:   •  nitrofurantoin, macrocrystal-monohydrate, (Macrobid) 100 MG capsule, TAKE 1 CAPSULE BY MOUTH TWICE DAILY X 10 DAYS, THEN TAKE 1 CAPSULE NIGHTLY FOR SUPPRESSIVE THERAPY E'COLI UTIs, Disp: 56 capsule, Rfl: 3  •  NON FORMULARY, Daily. Cholesterol pill, Disp: , Rfl:   •  NON FORMULARY, Daily. bp med, Disp: , Rfl:   •  Vibegron (Gemtesa) 75 MG tablet, Take 1 tablet by mouth Every Night for 30 days., Disp: 30 tablet, Rfl: 3  •  lansoprazole (PREVACID) 30 MG capsule, Take 1 capsule by mouth 2 (Two) Times a Day., Disp: 60 capsule, Rfl: 11  •  megestrol (MEGACE) 20 MG tablet, Take 1 tablet by mouth 2 (Two) Times a Day.,  "Disp: 60 tablet, Rfl: 11    ALLERGIES:  Patient has no known allergies.    VISIT VITALS:  Pulse 65   Ht 154.9 cm (61\")   Wt 43.1 kg (95 lb)   LMP  (LMP Unknown)   SpO2 94%   BMI 17.95 kg/m²   Physical Exam  Constitutional:       General: She is not in acute distress.     Appearance: Normal appearance. She is well-developed.   HENT:      Head: Normocephalic and atraumatic.   Eyes:      Pupils: Pupils are equal, round, and reactive to light.   Cardiovascular:      Rate and Rhythm: Normal rate and regular rhythm.      Heart sounds: Normal heart sounds.   Pulmonary:      Effort: Pulmonary effort is normal. No respiratory distress.      Breath sounds: Normal breath sounds. No wheezing, rhonchi or rales.   Abdominal:      General: Abdomen is flat. Bowel sounds are normal. There is no distension.      Palpations: Abdomen is soft. There is no mass.      Tenderness: There is no abdominal tenderness. There is no guarding or rebound.      Hernia: No hernia is present.   Musculoskeletal:         General: No swelling. Normal range of motion.      Cervical back: Normal range of motion and neck supple.      Right lower leg: No edema.      Left lower leg: No edema.   Skin:     General: Skin is warm and dry.   Neurological:      Mental Status: She is alert and oriented to person, place, and time.   Psychiatric:         Attention and Perception: Attention normal.         Mood and Affect: Mood normal.         Speech: Speech normal.         Behavior: Behavior normal. Behavior is cooperative.         Thought Content: Thought content normal.         Assessment    To the patient symptoms not improving on Protonix 20 mg once daily-I will start the patient on lansoprazole 30 mg twice daily dosing for 12 weeks, then decrease down to once daily dosing.  In addition to lansoprazole I will be adding in Megace 20 mg twice daily to increase appetite over the next 3 months.  We will recheck weight at that time.  Patient's recent " EGD/colonoscopy results were also reviewed with her-voiced understanding of those results.   Diagnosis Plan   1. Gastroesophageal reflux disease without esophagitis  lansoprazole (PREVACID) 30 MG capsule   2. Weight loss  megestrol (MEGACE) 20 MG tablet   3. Loss of appetite  megestrol (MEGACE) 20 MG tablet       Return in about 3 months (around 8/24/2022).                   This document has been electronically signed by Mitchell Bernardo PA-C  May 24, 2022 12:18 EDT    Part of this note may be an electronic transcription/translation of spoken language to printed text using the Dragon Dictation System.

## 2022-05-25 ENCOUNTER — TELEPHONE (OUTPATIENT)
Dept: UROLOGY | Facility: CLINIC | Age: 72
End: 2022-05-25

## 2022-05-25 DIAGNOSIS — N39.46 MIXED STRESS AND URGE URINARY INCONTINENCE: Primary | ICD-10-CM

## 2022-05-25 DIAGNOSIS — N32.81 DETRUSOR INSTABILITY OF BLADDER: ICD-10-CM

## 2022-05-25 DIAGNOSIS — R35.0 FREQUENCY OF MICTURITION: ICD-10-CM

## 2022-05-25 LAB — BACTERIA SPEC AEROBE CULT: NO GROWTH

## 2022-05-25 RX ORDER — SOLIFENACIN SUCCINATE 10 MG/1
10 TABLET, FILM COATED ORAL DAILY
Qty: 30 TABLET | Refills: 2 | Status: SHIPPED | OUTPATIENT
Start: 2022-05-25

## 2022-05-25 NOTE — TELEPHONE ENCOUNTER
Call patient to check on her post clinic visit and to discuss urine culture results negative at this time.  Continue Macrobid prophylaxis, will also send Vesicare 10 mg p.o. nightly for detrusor instability.  She was given samples of Myrbetriq 50 mg nightly, but insurance is unable to cover cost.  She had already failed therapy with oxybutynin.  Patient will follow-up in clinic as discussed     Result Notes    Urine Culture No growth

## 2022-05-31 ENCOUNTER — HOSPITAL ENCOUNTER (EMERGENCY)
Facility: HOSPITAL | Age: 72
Discharge: HOME | End: 2022-05-31
Attending: STUDENT IN AN ORGANIZED HEALTH CARE EDUCATION/TRAINING PROGRAM | Admitting: STUDENT IN AN ORGANIZED HEALTH CARE EDUCATION/TRAINING PROGRAM

## 2022-05-31 ENCOUNTER — APPOINTMENT (OUTPATIENT)
Dept: GENERAL RADIOLOGY | Facility: HOSPITAL | Age: 72
End: 2022-05-31

## 2022-05-31 VITALS
TEMPERATURE: 98.3 F | SYSTOLIC BLOOD PRESSURE: 137 MMHG | OXYGEN SATURATION: 97 % | HEART RATE: 90 BPM | BODY MASS INDEX: 17.75 KG/M2 | WEIGHT: 94 LBS | DIASTOLIC BLOOD PRESSURE: 83 MMHG | HEIGHT: 61 IN | RESPIRATION RATE: 16 BRPM

## 2022-05-31 DIAGNOSIS — J44.1 COPD WITH ACUTE EXACERBATION: Primary | ICD-10-CM

## 2022-05-31 LAB
A-A DO2: 62 MMHG (ref 0–300)
ALBUMIN SERPL-MCNC: 4.12 G/DL (ref 3.5–5.2)
ALBUMIN/GLOB SERPL: 1.4 G/DL
ALP SERPL-CCNC: 66 U/L (ref 39–117)
ALT SERPL W P-5'-P-CCNC: 12 U/L (ref 1–33)
ANION GAP SERPL CALCULATED.3IONS-SCNC: 13.8 MMOL/L (ref 5–15)
APTT PPP: 32.3 SECONDS (ref 26.5–34.5)
ARTERIAL PATENCY WRIST A: ABNORMAL
AST SERPL-CCNC: 12 U/L (ref 1–32)
ATMOSPHERIC PRESS: 730 MMHG
BACTERIA UR QL AUTO: ABNORMAL /HPF
BASE EXCESS BLDA CALC-SCNC: -1.6 MMOL/L (ref 0–2)
BASOPHILS # BLD AUTO: 0.07 10*3/MM3 (ref 0–0.2)
BASOPHILS NFR BLD AUTO: 0.6 % (ref 0–1.5)
BDY SITE: ABNORMAL
BILIRUB SERPL-MCNC: 0.3 MG/DL (ref 0–1.2)
BILIRUB UR QL STRIP: NEGATIVE
BODY TEMPERATURE: 0 C
BUN SERPL-MCNC: 8 MG/DL (ref 8–23)
BUN/CREAT SERPL: 9.9 (ref 7–25)
CALCIUM SPEC-SCNC: 9.4 MG/DL (ref 8.6–10.5)
CHLORIDE SERPL-SCNC: 106 MMOL/L (ref 98–107)
CLARITY UR: CLEAR
CO2 BLDA-SCNC: 24.4 MMOL/L (ref 22–33)
CO2 SERPL-SCNC: 21.2 MMOL/L (ref 22–29)
COHGB MFR BLD: 1.6 % (ref 0–5)
COLOR UR: YELLOW
CREAT SERPL-MCNC: 0.81 MG/DL (ref 0.57–1)
CRP SERPL-MCNC: 1.39 MG/DL (ref 0–0.5)
D-LACTATE SERPL-SCNC: 0.9 MMOL/L (ref 0.5–2)
DEPRECATED RDW RBC AUTO: 50.4 FL (ref 37–54)
EGFRCR SERPLBLD CKD-EPI 2021: 78.2 ML/MIN/1.73
EOSINOPHIL # BLD AUTO: 0.2 10*3/MM3 (ref 0–0.4)
EOSINOPHIL NFR BLD AUTO: 1.8 % (ref 0.3–6.2)
ERYTHROCYTE [DISTWIDTH] IN BLOOD BY AUTOMATED COUNT: 15.4 % (ref 12.3–15.4)
FLUAV RNA RESP QL NAA+PROBE: NOT DETECTED
FLUBV RNA RESP QL NAA+PROBE: NOT DETECTED
GAS FLOW AIRWAY: 2 LPM
GLOBULIN UR ELPH-MCNC: 2.9 GM/DL
GLUCOSE SERPL-MCNC: 109 MG/DL (ref 65–99)
GLUCOSE UR STRIP-MCNC: NEGATIVE MG/DL
HCO3 BLDA-SCNC: 23.2 MMOL/L (ref 20–26)
HCT VFR BLD AUTO: 41.6 % (ref 34–46.6)
HCT VFR BLD CALC: 41.3 % (ref 38–51)
HGB BLD-MCNC: 13.4 G/DL (ref 12–15.9)
HGB BLDA-MCNC: 13.5 G/DL (ref 13.5–17.5)
HGB UR QL STRIP.AUTO: NEGATIVE
HOLD SPECIMEN: NORMAL
HOLD SPECIMEN: NORMAL
HYALINE CASTS UR QL AUTO: ABNORMAL /LPF
IMM GRANULOCYTES # BLD AUTO: 0.03 10*3/MM3 (ref 0–0.05)
IMM GRANULOCYTES NFR BLD AUTO: 0.3 % (ref 0–0.5)
INHALED O2 CONCENTRATION: 28 %
INR PPP: 1.14 (ref 0.9–1.1)
KETONES UR QL STRIP: NEGATIVE
LEUKOCYTE ESTERASE UR QL STRIP.AUTO: ABNORMAL
LYMPHOCYTES # BLD AUTO: 1.3 10*3/MM3 (ref 0.7–3.1)
LYMPHOCYTES NFR BLD AUTO: 11.6 % (ref 19.6–45.3)
Lab: ABNORMAL
MAGNESIUM SERPL-MCNC: 2.1 MG/DL (ref 1.6–2.4)
MCH RBC QN AUTO: 28.8 PG (ref 26.6–33)
MCHC RBC AUTO-ENTMCNC: 32.2 G/DL (ref 31.5–35.7)
MCV RBC AUTO: 89.3 FL (ref 79–97)
METHGB BLD QL: 0.1 % (ref 0–3)
MODALITY: ABNORMAL
MONOCYTES # BLD AUTO: 0.51 10*3/MM3 (ref 0.1–0.9)
MONOCYTES NFR BLD AUTO: 4.6 % (ref 5–12)
NEUTROPHILS NFR BLD AUTO: 81.1 % (ref 42.7–76)
NEUTROPHILS NFR BLD AUTO: 9.08 10*3/MM3 (ref 1.7–7)
NITRITE UR QL STRIP: NEGATIVE
NOTE: ABNORMAL
NRBC BLD AUTO-RTO: 0 /100 WBC (ref 0–0.2)
NT-PROBNP SERPL-MCNC: 224.8 PG/ML (ref 0–900)
OXYHGB MFR BLDV: 95.3 % (ref 94–99)
PCO2 BLDA: 38.6 MM HG (ref 35–45)
PCO2 TEMP ADJ BLD: ABNORMAL MM[HG]
PH BLDA: 7.39 PH UNITS (ref 7.35–7.45)
PH UR STRIP.AUTO: 6.5 [PH] (ref 5–8)
PH, TEMP CORRECTED: ABNORMAL
PLATELET # BLD AUTO: 210 10*3/MM3 (ref 140–450)
PMV BLD AUTO: 11.1 FL (ref 6–12)
PO2 BLDA: 86.1 MM HG (ref 83–108)
PO2 TEMP ADJ BLD: ABNORMAL MM[HG]
POTASSIUM SERPL-SCNC: 4.7 MMOL/L (ref 3.5–5.2)
PROCALCITONIN SERPL-MCNC: 0.14 NG/ML (ref 0–0.25)
PROT SERPL-MCNC: 7 G/DL (ref 6–8.5)
PROT UR QL STRIP: NEGATIVE
PROTHROMBIN TIME: 14.8 SECONDS (ref 12.1–14.7)
QT INTERVAL: 378 MS
QTC INTERVAL: 435 MS
RBC # BLD AUTO: 4.66 10*6/MM3 (ref 3.77–5.28)
RBC # UR STRIP: ABNORMAL /HPF
REF LAB TEST METHOD: ABNORMAL
SAO2 % BLDCOA: 96.9 % (ref 94–99)
SARS-COV-2 RNA RESP QL NAA+PROBE: NOT DETECTED
SODIUM SERPL-SCNC: 141 MMOL/L (ref 136–145)
SP GR UR STRIP: 1.01 (ref 1–1.03)
SQUAMOUS #/AREA URNS HPF: ABNORMAL /HPF
TROPONIN T SERPL-MCNC: <0.01 NG/ML (ref 0–0.03)
TROPONIN T SERPL-MCNC: <0.01 NG/ML (ref 0–0.03)
UROBILINOGEN UR QL STRIP: ABNORMAL
VENTILATOR MODE: ABNORMAL
WBC # UR STRIP: ABNORMAL /HPF
WBC NRBC COR # BLD: 11.19 10*3/MM3 (ref 3.4–10.8)
WHOLE BLOOD HOLD COAG: NORMAL
WHOLE BLOOD HOLD SPECIMEN: NORMAL

## 2022-05-31 PROCEDURE — 83735 ASSAY OF MAGNESIUM: CPT | Performed by: PHYSICIAN ASSISTANT

## 2022-05-31 PROCEDURE — 81001 URINALYSIS AUTO W/SCOPE: CPT | Performed by: PHYSICIAN ASSISTANT

## 2022-05-31 PROCEDURE — 82375 ASSAY CARBOXYHB QUANT: CPT

## 2022-05-31 PROCEDURE — 94640 AIRWAY INHALATION TREATMENT: CPT

## 2022-05-31 PROCEDURE — 84145 PROCALCITONIN (PCT): CPT | Performed by: PHYSICIAN ASSISTANT

## 2022-05-31 PROCEDURE — 87040 BLOOD CULTURE FOR BACTERIA: CPT | Performed by: PHYSICIAN ASSISTANT

## 2022-05-31 PROCEDURE — 85730 THROMBOPLASTIN TIME PARTIAL: CPT | Performed by: PHYSICIAN ASSISTANT

## 2022-05-31 PROCEDURE — 82805 BLOOD GASES W/O2 SATURATION: CPT

## 2022-05-31 PROCEDURE — 93005 ELECTROCARDIOGRAM TRACING: CPT | Performed by: PHYSICIAN ASSISTANT

## 2022-05-31 PROCEDURE — 83605 ASSAY OF LACTIC ACID: CPT | Performed by: PHYSICIAN ASSISTANT

## 2022-05-31 PROCEDURE — 94799 UNLISTED PULMONARY SVC/PX: CPT

## 2022-05-31 PROCEDURE — 83880 ASSAY OF NATRIURETIC PEPTIDE: CPT | Performed by: PHYSICIAN ASSISTANT

## 2022-05-31 PROCEDURE — 94664 DEMO&/EVAL PT USE INHALER: CPT

## 2022-05-31 PROCEDURE — 85610 PROTHROMBIN TIME: CPT | Performed by: PHYSICIAN ASSISTANT

## 2022-05-31 PROCEDURE — 71045 X-RAY EXAM CHEST 1 VIEW: CPT | Performed by: RADIOLOGY

## 2022-05-31 PROCEDURE — 71045 X-RAY EXAM CHEST 1 VIEW: CPT

## 2022-05-31 PROCEDURE — 83050 HGB METHEMOGLOBIN QUAN: CPT

## 2022-05-31 PROCEDURE — 87636 SARSCOV2 & INF A&B AMP PRB: CPT | Performed by: PHYSICIAN ASSISTANT

## 2022-05-31 PROCEDURE — 84484 ASSAY OF TROPONIN QUANT: CPT | Performed by: PHYSICIAN ASSISTANT

## 2022-05-31 PROCEDURE — 86140 C-REACTIVE PROTEIN: CPT | Performed by: PHYSICIAN ASSISTANT

## 2022-05-31 PROCEDURE — 99285 EMERGENCY DEPT VISIT HI MDM: CPT

## 2022-05-31 PROCEDURE — 36415 COLL VENOUS BLD VENIPUNCTURE: CPT

## 2022-05-31 PROCEDURE — 36600 WITHDRAWAL OF ARTERIAL BLOOD: CPT

## 2022-05-31 PROCEDURE — 80053 COMPREHEN METABOLIC PANEL: CPT | Performed by: PHYSICIAN ASSISTANT

## 2022-05-31 PROCEDURE — 99284 EMERGENCY DEPT VISIT MOD MDM: CPT

## 2022-05-31 PROCEDURE — 93010 ELECTROCARDIOGRAM REPORT: CPT | Performed by: INTERNAL MEDICINE

## 2022-05-31 PROCEDURE — 85025 COMPLETE CBC W/AUTO DIFF WBC: CPT | Performed by: PHYSICIAN ASSISTANT

## 2022-05-31 RX ORDER — BENZONATATE 100 MG/1
100 CAPSULE ORAL 3 TIMES DAILY PRN
Qty: 15 CAPSULE | Refills: 0 | Status: SHIPPED | OUTPATIENT
Start: 2022-05-31

## 2022-05-31 RX ORDER — IPRATROPIUM BROMIDE AND ALBUTEROL SULFATE 2.5; .5 MG/3ML; MG/3ML
3 SOLUTION RESPIRATORY (INHALATION) ONCE
Status: COMPLETED | OUTPATIENT
Start: 2022-05-31 | End: 2022-05-31

## 2022-05-31 RX ORDER — DOXYCYCLINE 100 MG/1
100 CAPSULE ORAL 2 TIMES DAILY
Qty: 20 CAPSULE | Refills: 0 | Status: SHIPPED | OUTPATIENT
Start: 2022-05-31 | End: 2022-06-10

## 2022-05-31 RX ORDER — METHYLPREDNISOLONE 4 MG/1
TABLET ORAL
Qty: 21 TABLET | Refills: 0 | Status: SHIPPED | OUTPATIENT
Start: 2022-05-31

## 2022-05-31 RX ORDER — SODIUM CHLORIDE 0.9 % (FLUSH) 0.9 %
10 SYRINGE (ML) INJECTION AS NEEDED
Status: DISCONTINUED | OUTPATIENT
Start: 2022-05-31 | End: 2022-05-31 | Stop reason: HOSPADM

## 2022-05-31 RX ADMIN — IPRATROPIUM BROMIDE AND ALBUTEROL SULFATE 3 ML: .5; 3 SOLUTION RESPIRATORY (INHALATION) at 10:04

## 2022-06-05 LAB
BACTERIA SPEC AEROBE CULT: NORMAL
BACTERIA SPEC AEROBE CULT: NORMAL

## 2022-06-07 ENCOUNTER — TELEPHONE (OUTPATIENT)
Dept: UROLOGY | Facility: CLINIC | Age: 72
End: 2022-06-07

## 2022-06-07 NOTE — TELEPHONE ENCOUNTER
Patient is wanting to get more gemtesa sent in she says she has tried others and failed to help this one does but we dont have anything showing that and insurance wont approve pa for gemtesia what do we need to tell her or do

## 2022-09-12 ENCOUNTER — TELEPHONE (OUTPATIENT)
Dept: UROLOGY | Facility: CLINIC | Age: 72
End: 2022-09-12

## 2022-09-12 NOTE — TELEPHONE ENCOUNTER
The patient is asking to speak to Jhon if possible. She wanted to to see if her issues are her kidneys since it's not her bladder. She is still having the same issues as before.

## 2022-09-12 NOTE — TELEPHONE ENCOUNTER
Routing to Jhon. I will call her back I do not care to just advise me on your thoughts on this and I will return her call.

## 2022-09-12 NOTE — TELEPHONE ENCOUNTER
Spoke with patient today regarding her concerns for urinary incontinence.  She was doing great on Myrbetriq 50 mg, however she reports her Medicare card would not cover prescriptions.  Currently on oxybutynin by her PCP with no improvement.  Is now having nocturia 7-8 times a night and is miserable.  We discussed Botox injections, states she will think about it.  Follow-up on 9/24 Myrbetriq samples and definitive plan of care.  And agreeable

## 2022-09-13 ENCOUNTER — TELEPHONE (OUTPATIENT)
Dept: UROLOGY | Facility: CLINIC | Age: 72
End: 2022-09-13

## 2022-09-13 NOTE — TELEPHONE ENCOUNTER
Caller: [facility]    Relationship: PCP OFFICE    Best call back number: [phone]    What form or medical record are you requesting: LAST 2 OFFICE NOTES    Who is requesting this form or medical record from you: PCP [facility]    How would you like to receive the form or medical records (pick-up, mail, fax): FAX  If fax, what is the fax number: [phone]    Timeframe paperwork needed: ASAP

## 2022-10-06 ENCOUNTER — APPOINTMENT (OUTPATIENT)
Dept: CT IMAGING | Facility: HOSPITAL | Age: 72
End: 2022-10-06

## 2022-10-06 ENCOUNTER — HOSPITAL ENCOUNTER (EMERGENCY)
Facility: HOSPITAL | Age: 72
Discharge: HOME | End: 2022-10-06
Attending: STUDENT IN AN ORGANIZED HEALTH CARE EDUCATION/TRAINING PROGRAM | Admitting: STUDENT IN AN ORGANIZED HEALTH CARE EDUCATION/TRAINING PROGRAM

## 2022-10-06 ENCOUNTER — APPOINTMENT (OUTPATIENT)
Dept: GENERAL RADIOLOGY | Facility: HOSPITAL | Age: 72
End: 2022-10-06

## 2022-10-06 VITALS
RESPIRATION RATE: 18 BRPM | HEIGHT: 61 IN | BODY MASS INDEX: 18.31 KG/M2 | DIASTOLIC BLOOD PRESSURE: 60 MMHG | SYSTOLIC BLOOD PRESSURE: 128 MMHG | OXYGEN SATURATION: 99 % | TEMPERATURE: 98.2 F | HEART RATE: 76 BPM | WEIGHT: 97 LBS

## 2022-10-06 DIAGNOSIS — F19.20 POLYSUBSTANCE (EXCLUDING OPIOIDS) DEPENDENCE: ICD-10-CM

## 2022-10-06 DIAGNOSIS — J44.1 COPD EXACERBATION: Primary | ICD-10-CM

## 2022-10-06 LAB
A-A DO2: 87.2 MMHG (ref 0–300)
A-A DO2: 95.6 MMHG (ref 0–300)
ALBUMIN SERPL-MCNC: 3.15 G/DL (ref 3.5–5.2)
ALBUMIN/GLOB SERPL: 1 G/DL
ALP SERPL-CCNC: 123 U/L (ref 39–117)
ALT SERPL W P-5'-P-CCNC: 13 U/L (ref 1–33)
AMMONIA BLD-SCNC: 19 UMOL/L (ref 11–51)
AMPHET+METHAMPHET UR QL: NEGATIVE
AMPHETAMINES UR QL: POSITIVE
ANION GAP SERPL CALCULATED.3IONS-SCNC: 9.9 MMOL/L (ref 5–15)
ARTERIAL PATENCY WRIST A: ABNORMAL
ARTERIAL PATENCY WRIST A: POSITIVE
AST SERPL-CCNC: 19 U/L (ref 1–32)
ATMOSPHERIC PRESS: 727 MMHG
ATMOSPHERIC PRESS: 727 MMHG
BARBITURATES UR QL SCN: NEGATIVE
BASE EXCESS BLDA CALC-SCNC: -1.1 MMOL/L (ref 0–2)
BASE EXCESS BLDA CALC-SCNC: -2 MMOL/L (ref 0–2)
BASOPHILS # BLD AUTO: 0.08 10*3/MM3 (ref 0–0.2)
BASOPHILS NFR BLD AUTO: 0.7 % (ref 0–1.5)
BDY SITE: ABNORMAL
BDY SITE: ABNORMAL
BENZODIAZ UR QL SCN: POSITIVE
BILIRUB SERPL-MCNC: <0.2 MG/DL (ref 0–1.2)
BILIRUB UR QL STRIP: NEGATIVE
BODY TEMPERATURE: 0 C
BODY TEMPERATURE: 0 C
BUN SERPL-MCNC: 11 MG/DL (ref 8–23)
BUN/CREAT SERPL: 11.2 (ref 7–25)
BUPRENORPHINE SERPL-MCNC: POSITIVE NG/ML
CALCIUM SPEC-SCNC: 8.6 MG/DL (ref 8.6–10.5)
CANNABINOIDS SERPL QL: NEGATIVE
CHLORIDE SERPL-SCNC: 108 MMOL/L (ref 98–107)
CLARITY UR: CLEAR
CO2 BLDA-SCNC: 25.8 MMOL/L (ref 22–33)
CO2 BLDA-SCNC: 26.4 MMOL/L (ref 22–33)
CO2 SERPL-SCNC: 21.1 MMOL/L (ref 22–29)
COCAINE UR QL: NEGATIVE
COHGB MFR BLD: 1.9 % (ref 0–5)
COHGB MFR BLD: 2 % (ref 0–5)
COLOR UR: ABNORMAL
CREAT SERPL-MCNC: 0.98 MG/DL (ref 0.57–1)
DEPRECATED RDW RBC AUTO: 47.2 FL (ref 37–54)
EGFRCR SERPLBLD CKD-EPI 2021: 62.2 ML/MIN/1.73
EOSINOPHIL # BLD AUTO: 0.23 10*3/MM3 (ref 0–0.4)
EOSINOPHIL NFR BLD AUTO: 2.1 % (ref 0.3–6.2)
ERYTHROCYTE [DISTWIDTH] IN BLOOD BY AUTOMATED COUNT: 13.6 % (ref 12.3–15.4)
ETHANOL BLD-MCNC: <10 MG/DL (ref 0–10)
ETHANOL UR QL: <0.01 %
GAS FLOW AIRWAY: 2.5 LPM
GAS FLOW AIRWAY: 3.5 LPM
GLOBULIN UR ELPH-MCNC: 3.3 GM/DL
GLUCOSE BLDC GLUCOMTR-MCNC: 105 MG/DL (ref 70–130)
GLUCOSE SERPL-MCNC: 106 MG/DL (ref 65–99)
GLUCOSE UR STRIP-MCNC: NEGATIVE MG/DL
HCO3 BLDA-SCNC: 24.4 MMOL/L (ref 20–26)
HCO3 BLDA-SCNC: 25 MMOL/L (ref 20–26)
HCT VFR BLD AUTO: 36.5 % (ref 34–46.6)
HCT VFR BLD CALC: 34.6 % (ref 38–51)
HCT VFR BLD CALC: 34.9 % (ref 38–51)
HGB BLD-MCNC: 11.2 G/DL (ref 12–15.9)
HGB BLDA-MCNC: 11.3 G/DL (ref 13.5–17.5)
HGB BLDA-MCNC: 11.4 G/DL (ref 13.5–17.5)
HGB UR QL STRIP.AUTO: NEGATIVE
HOLD SPECIMEN: NORMAL
HOLD SPECIMEN: NORMAL
IMM GRANULOCYTES # BLD AUTO: 0.08 10*3/MM3 (ref 0–0.05)
IMM GRANULOCYTES NFR BLD AUTO: 0.7 % (ref 0–0.5)
INHALED O2 CONCENTRATION: 30 %
INHALED O2 CONCENTRATION: 34 %
KETONES UR QL STRIP: ABNORMAL
LEUKOCYTE ESTERASE UR QL STRIP.AUTO: NEGATIVE
LYMPHOCYTES # BLD AUTO: 1.69 10*3/MM3 (ref 0.7–3.1)
LYMPHOCYTES NFR BLD AUTO: 15.1 % (ref 19.6–45.3)
Lab: ABNORMAL
Lab: ABNORMAL
MAGNESIUM SERPL-MCNC: 2.1 MG/DL (ref 1.6–2.4)
MCH RBC QN AUTO: 28.9 PG (ref 26.6–33)
MCHC RBC AUTO-ENTMCNC: 30.7 G/DL (ref 31.5–35.7)
MCV RBC AUTO: 94.3 FL (ref 79–97)
METHADONE UR QL SCN: NEGATIVE
METHGB BLD QL: 0.3 % (ref 0–3)
METHGB BLD QL: 0.4 % (ref 0–3)
MODALITY: ABNORMAL
MODALITY: ABNORMAL
MONOCYTES # BLD AUTO: 1.58 10*3/MM3 (ref 0.1–0.9)
MONOCYTES NFR BLD AUTO: 14.1 % (ref 5–12)
NEUTROPHILS NFR BLD AUTO: 67.3 % (ref 42.7–76)
NEUTROPHILS NFR BLD AUTO: 7.54 10*3/MM3 (ref 1.7–7)
NITRITE UR QL STRIP: NEGATIVE
NOTE: ABNORMAL
NOTE: ABNORMAL
NRBC BLD AUTO-RTO: 0 /100 WBC (ref 0–0.2)
OPIATES UR QL: POSITIVE
OXYCODONE UR QL SCN: POSITIVE
OXYHGB MFR BLDV: 90.1 % (ref 94–99)
OXYHGB MFR BLDV: 93.6 % (ref 94–99)
PCO2 BLDA: 46.8 MM HG (ref 35–45)
PCO2 BLDA: 47.6 MM HG (ref 35–45)
PCO2 TEMP ADJ BLD: ABNORMAL MM[HG]
PCO2 TEMP ADJ BLD: ABNORMAL MM[HG]
PCP UR QL SCN: NEGATIVE
PH BLDA: 7.32 PH UNITS (ref 7.35–7.45)
PH BLDA: 7.34 PH UNITS (ref 7.35–7.45)
PH UR STRIP.AUTO: <=5 [PH] (ref 5–8)
PH, TEMP CORRECTED: ABNORMAL
PH, TEMP CORRECTED: ABNORMAL
PLATELET # BLD AUTO: 285 10*3/MM3 (ref 140–450)
PMV BLD AUTO: 10.2 FL (ref 6–12)
PO2 BLDA: 64.7 MM HG (ref 83–108)
PO2 BLDA: 83 MM HG (ref 83–108)
PO2 TEMP ADJ BLD: ABNORMAL MM[HG]
PO2 TEMP ADJ BLD: ABNORMAL MM[HG]
POTASSIUM SERPL-SCNC: 4.5 MMOL/L (ref 3.5–5.2)
PROPOXYPH UR QL: NEGATIVE
PROT SERPL-MCNC: 6.4 G/DL (ref 6–8.5)
PROT UR QL STRIP: ABNORMAL
RBC # BLD AUTO: 3.87 10*6/MM3 (ref 3.77–5.28)
SAO2 % BLDCOA: 92.1 % (ref 94–99)
SAO2 % BLDCOA: 95.9 % (ref 94–99)
SODIUM SERPL-SCNC: 139 MMOL/L (ref 136–145)
SP GR UR STRIP: >=1.03 (ref 1–1.03)
TRICYCLICS UR QL SCN: NEGATIVE
UROBILINOGEN UR QL STRIP: ABNORMAL
VENTILATOR MODE: ABNORMAL
VENTILATOR MODE: ABNORMAL
WBC NRBC COR # BLD: 11.2 10*3/MM3 (ref 3.4–10.8)
WHOLE BLOOD HOLD COAG: NORMAL
WHOLE BLOOD HOLD SPECIMEN: NORMAL

## 2022-10-06 PROCEDURE — 81003 URINALYSIS AUTO W/O SCOPE: CPT | Performed by: STUDENT IN AN ORGANIZED HEALTH CARE EDUCATION/TRAINING PROGRAM

## 2022-10-06 PROCEDURE — 82140 ASSAY OF AMMONIA: CPT | Performed by: STUDENT IN AN ORGANIZED HEALTH CARE EDUCATION/TRAINING PROGRAM

## 2022-10-06 PROCEDURE — 80306 DRUG TEST PRSMV INSTRMNT: CPT | Performed by: STUDENT IN AN ORGANIZED HEALTH CARE EDUCATION/TRAINING PROGRAM

## 2022-10-06 PROCEDURE — 96374 THER/PROPH/DIAG INJ IV PUSH: CPT

## 2022-10-06 PROCEDURE — 83050 HGB METHEMOGLOBIN QUAN: CPT

## 2022-10-06 PROCEDURE — 25010000002 NALOXONE PER 1 MG: Performed by: STUDENT IN AN ORGANIZED HEALTH CARE EDUCATION/TRAINING PROGRAM

## 2022-10-06 PROCEDURE — 70450 CT HEAD/BRAIN W/O DYE: CPT | Performed by: RADIOLOGY

## 2022-10-06 PROCEDURE — 93005 ELECTROCARDIOGRAM TRACING: CPT | Performed by: STUDENT IN AN ORGANIZED HEALTH CARE EDUCATION/TRAINING PROGRAM

## 2022-10-06 PROCEDURE — 93010 ELECTROCARDIOGRAM REPORT: CPT | Performed by: INTERNAL MEDICINE

## 2022-10-06 PROCEDURE — 80053 COMPREHEN METABOLIC PANEL: CPT | Performed by: STUDENT IN AN ORGANIZED HEALTH CARE EDUCATION/TRAINING PROGRAM

## 2022-10-06 PROCEDURE — 36600 WITHDRAWAL OF ARTERIAL BLOOD: CPT

## 2022-10-06 PROCEDURE — 71045 X-RAY EXAM CHEST 1 VIEW: CPT | Performed by: RADIOLOGY

## 2022-10-06 PROCEDURE — 82962 GLUCOSE BLOOD TEST: CPT

## 2022-10-06 PROCEDURE — 82077 ASSAY SPEC XCP UR&BREATH IA: CPT | Performed by: STUDENT IN AN ORGANIZED HEALTH CARE EDUCATION/TRAINING PROGRAM

## 2022-10-06 PROCEDURE — 71045 X-RAY EXAM CHEST 1 VIEW: CPT

## 2022-10-06 PROCEDURE — 99284 EMERGENCY DEPT VISIT MOD MDM: CPT

## 2022-10-06 PROCEDURE — 70450 CT HEAD/BRAIN W/O DYE: CPT

## 2022-10-06 PROCEDURE — 82805 BLOOD GASES W/O2 SATURATION: CPT

## 2022-10-06 PROCEDURE — 25010000002 METHYLPREDNISOLONE PER 125 MG: Performed by: STUDENT IN AN ORGANIZED HEALTH CARE EDUCATION/TRAINING PROGRAM

## 2022-10-06 PROCEDURE — 82375 ASSAY CARBOXYHB QUANT: CPT

## 2022-10-06 PROCEDURE — 96375 TX/PRO/DX INJ NEW DRUG ADDON: CPT

## 2022-10-06 PROCEDURE — 85025 COMPLETE CBC W/AUTO DIFF WBC: CPT | Performed by: STUDENT IN AN ORGANIZED HEALTH CARE EDUCATION/TRAINING PROGRAM

## 2022-10-06 PROCEDURE — 83735 ASSAY OF MAGNESIUM: CPT | Performed by: STUDENT IN AN ORGANIZED HEALTH CARE EDUCATION/TRAINING PROGRAM

## 2022-10-06 RX ORDER — SODIUM CHLORIDE 0.9 % (FLUSH) 0.9 %
10 SYRINGE (ML) INJECTION AS NEEDED
Status: DISCONTINUED | OUTPATIENT
Start: 2022-10-06 | End: 2022-10-06 | Stop reason: HOSPADM

## 2022-10-06 RX ORDER — PREDNISONE 50 MG/1
50 TABLET ORAL DAILY
Qty: 5 TABLET | Refills: 0 | Status: SHIPPED | OUTPATIENT
Start: 2022-10-06

## 2022-10-06 RX ORDER — NALOXONE HCL 0.4 MG/ML
0.4 VIAL (ML) INJECTION ONCE
Status: COMPLETED | OUTPATIENT
Start: 2022-10-06 | End: 2022-10-06

## 2022-10-06 RX ORDER — DOXYCYCLINE 100 MG/1
100 CAPSULE ORAL ONCE
Status: COMPLETED | OUTPATIENT
Start: 2022-10-06 | End: 2022-10-06

## 2022-10-06 RX ORDER — METHYLPREDNISOLONE SODIUM SUCCINATE 125 MG/2ML
125 INJECTION, POWDER, LYOPHILIZED, FOR SOLUTION INTRAMUSCULAR; INTRAVENOUS ONCE
Status: COMPLETED | OUTPATIENT
Start: 2022-10-06 | End: 2022-10-06

## 2022-10-06 RX ORDER — DOXYCYCLINE 100 MG/1
100 CAPSULE ORAL 2 TIMES DAILY
Qty: 13 CAPSULE | Refills: 0 | Status: SHIPPED | OUTPATIENT
Start: 2022-10-06

## 2022-10-06 RX ADMIN — METHYLPREDNISOLONE SODIUM SUCCINATE 125 MG: 125 INJECTION, POWDER, FOR SOLUTION INTRAMUSCULAR; INTRAVENOUS at 16:29

## 2022-10-06 RX ADMIN — DOXYCYCLINE 100 MG: 100 CAPSULE ORAL at 17:00

## 2022-10-06 RX ADMIN — Medication 10 ML: at 14:56

## 2022-10-06 RX ADMIN — NALOXONE HYDROCHLORIDE 0.4 MG: 0.4 INJECTION, SOLUTION INTRAMUSCULAR; INTRAVENOUS; SUBCUTANEOUS at 14:56

## 2022-10-06 NOTE — ED PROVIDER NOTES
Subjective   History of Present Illness  71-year-old female with COPD presents to the ER due to concerns for increasing weakness and confusion.  Patient noted she has been stumbling lately.  Patient denied recent head trauma or loss of consciousness.  Denied chest pain.  Confirmed associated shortness of breath with wheezing.  Patient also confirmed cough with sputum production.  Denied hemoptysis.  Denied fever or chills.  No obvious alleviating factors.  Vitals stable        Review of Systems   Constitutional: Positive for fatigue.   Respiratory: Positive for cough, shortness of breath and wheezing.    Neurological: Positive for weakness.   All other systems reviewed and are negative.      Past Medical History:   Diagnosis Date   • Arthritis    • COPD (chronic obstructive pulmonary disease) (HCC)    • Elevated cholesterol    • High cholesterol    • History of transfusion    • Hyperlipidemia    • Hypertension        No Known Allergies    Past Surgical History:   Procedure Laterality Date   • BREAST BIOPSY Right     yrs ago benign   • COLONOSCOPY     • COLONOSCOPY N/A 5/10/2022    Procedure: COLONOSCOPY;  Surgeon: Kenia Braun MD;  [facility];  Service: Gastroenterology;  Laterality: N/A;   • ENDOSCOPY     • ENDOSCOPY N/A 5/10/2022    Procedure: ESOPHAGOGASTRODUODENOSCOPY WITH BIOPSY;  Surgeon: Kenia Braun MD;  Location: [facility];  Service: Gastroenterology;  Laterality: N/A;   • HIP SURGERY Right        Family History   Problem Relation Age of Onset   • No Known Problems Mother    • No Known Problems Father    • No Known Problems Sister    • No Known Problems Brother    • No Known Problems Son    • No Known Problems Daughter    • No Known Problems Maternal Grandmother    • No Known Problems Maternal Grandfather    • No Known Problems Paternal Grandmother    • No Known Problems Paternal Grandfather    • No Known Problems Cousin    • Rheum arthritis Neg Hx    • Osteoarthritis  Neg Hx    • Asthma Neg Hx    • Diabetes Neg Hx    • Heart failure Neg Hx    • Hyperlipidemia Neg Hx    • Hypertension Neg Hx    • Migraines Neg Hx    • Rashes / Skin problems Neg Hx    • Seizures Neg Hx    • Stroke Neg Hx    • Thyroid disease Neg Hx    • Breast cancer Neg Hx        Social History     Socioeconomic History   • Marital status:    Tobacco Use   • Smoking status: Current Every Day Smoker     Packs/day: 1.00     Years: 30.00     Pack years: 30.00     Types: Cigarettes   • Smokeless tobacco: Never Used   Vaping Use   • Vaping Use: Never used   Substance and Sexual Activity   • Alcohol use: No   • Drug use: No   • Sexual activity: Defer           Objective   Physical Exam  Constitutional:       General: She is not in acute distress.     Appearance: Normal appearance. She is not ill-appearing.   HENT:      Head: Normocephalic and atraumatic.      Right Ear: External ear normal.      Left Ear: External ear normal.      Nose: Nose normal.      Mouth/Throat:      Mouth: Mucous membranes are moist.   Eyes:      Extraocular Movements: Extraocular movements intact.      Pupils: Pupils are equal, round, and reactive to light.      Comments: Pupils slightly constricted bilaterally.  However, response to light and accommodation is equal bilaterally.   Cardiovascular:      Rate and Rhythm: Normal rate and regular rhythm.      Heart sounds: No murmur heard.  Pulmonary:      Effort: Pulmonary effort is normal. No respiratory distress.      Breath sounds: Normal breath sounds. No wheezing.   Abdominal:      General: Bowel sounds are normal.      Palpations: Abdomen is soft.      Tenderness: There is no abdominal tenderness.   Musculoskeletal:         General: No deformity or signs of injury. Normal range of motion.      Cervical back: Normal range of motion and neck supple.   Skin:     General: Skin is warm and dry.      Findings: No erythema.   Neurological:      General: No focal deficit present.      Mental  Status: She is alert and oriented to person, place, and time. Mental status is at baseline.      Cranial Nerves: No cranial nerve deficit.   Psychiatric:         Mood and Affect: Mood normal.         Behavior: Behavior normal.         Thought Content: Thought content normal.         Procedures           ED Course  ED Course as of 10/06/22 1649   Thu Oct 06, 2022   1434 EKG noted sinus rhythm.  93 bpm.  I directly evaluated the EKG of this patient and noted no acute abnormalities. [SF]   1648 CBC unremarkable.  CMP unremarkable.  UDS positive for methamphetamines, opiates, benzodiazepines, oxycodone, and buprenorphine.  Ethanol within normal limits.  Toxicology work-up otherwise unremarkable.  Troponin within normal limits.  Initial ABG noted slight acidosis.  Patient's O2 adjusted to less than her normal home O2.  O2 saturations remained stable.  Repeat ABG noted significant improvement and findings consistent with chronic COPD.  Patient's mental status continued to improve after Narcan 0.4 mg IV x1 was given.  Head CT without contrast noted no acute abnormality.  Chest x-ray unremarkable.  Symptoms likely secondary to polysubstance use.  I will also treat his COPD exacerbation.  Work up and results were discussed throughly with the patient.  The patient will be discharged for further monitoring and management with their PCP.  Red flags, warning signs, worsening symptoms, and when to return to the ER discussed with and understood by the patient.  Patient will follow up with their PCP in a timely manner.  Vitals stable at discharge. [SF]      ED Course User Index  [SF] Georgi Teresa,                                            MDM    Final diagnoses:   COPD exacerbation (HCC)   Polysubstance (excluding opioids) dependence (HCC)       ED Disposition  ED Disposition     ED Disposition   Discharge    Condition   Stable    Comment   --             No follow-up provider specified.       Medication List      New  Prescriptions    doxycycline 100 MG capsule  Commonly known as: MONODOX  Take 1 capsule by mouth 2 (Two) Times a Day.     predniSONE 50 MG tablet  Commonly known as: DELTASONE  Take 1 tablet by mouth Daily.           Where to Get Your Medications      These medications were sent to [facility], [place] - [address] [facility] [address] [place] [address] - [phone] [facility] [phone]   [address] [facility] [address] [address]    Phone: [phone]   · doxycycline 100 MG capsule  · predniSONE 50 MG tablet          Georgi Teresa DO  10/06/22 8004

## 2022-10-09 LAB
QT INTERVAL: 352 MS
QTC INTERVAL: 437 MS

## 2023-02-10 ENCOUNTER — TELEPHONE (OUTPATIENT)
Dept: UROLOGY | Facility: CLINIC | Age: 73
End: 2023-02-10
Payer: MEDICARE

## 2023-02-10 NOTE — TELEPHONE ENCOUNTER
PA sent for gemtesa and approved.         Approvedtoday  PA Case: [id number], Status: Approved, Coverage Starts on: 11/11/2022 12:00:00 AM, Coverage Ends on: 2/10/2024 12:00:00 AM.

## 2023-03-24 ENCOUNTER — LAB (OUTPATIENT)
Dept: LAB | Facility: HOSPITAL | Age: 73
End: 2023-03-24
Payer: MEDICARE

## 2023-03-24 ENCOUNTER — TRANSCRIBE ORDERS (OUTPATIENT)
Dept: ADMINISTRATIVE | Facility: HOSPITAL | Age: 73
End: 2023-03-24
Payer: MEDICARE

## 2023-03-24 DIAGNOSIS — E63.9 NUTRITIONAL AND METABOLIC CARDIOMYOPATHY: ICD-10-CM

## 2023-03-24 DIAGNOSIS — E78.5 HYPERLIPIDEMIA, UNSPECIFIED HYPERLIPIDEMIA TYPE: ICD-10-CM

## 2023-03-24 DIAGNOSIS — R53.83 TIREDNESS: ICD-10-CM

## 2023-03-24 DIAGNOSIS — E88.9 NUTRITIONAL AND METABOLIC CARDIOMYOPATHY: ICD-10-CM

## 2023-03-24 DIAGNOSIS — E78.5 HYPERLIPIDEMIA, UNSPECIFIED HYPERLIPIDEMIA TYPE: Primary | ICD-10-CM

## 2023-03-24 DIAGNOSIS — D50.9 IRON DEFICIENCY ANEMIA, UNSPECIFIED IRON DEFICIENCY ANEMIA TYPE: ICD-10-CM

## 2023-03-24 DIAGNOSIS — I43 NUTRITIONAL AND METABOLIC CARDIOMYOPATHY: ICD-10-CM

## 2023-03-24 PROCEDURE — 36415 COLL VENOUS BLD VENIPUNCTURE: CPT

## 2023-03-24 PROCEDURE — 80061 LIPID PANEL: CPT

## 2023-03-24 PROCEDURE — 85025 COMPLETE CBC W/AUTO DIFF WBC: CPT

## 2023-03-24 PROCEDURE — 80053 COMPREHEN METABOLIC PANEL: CPT

## 2023-03-24 PROCEDURE — 84443 ASSAY THYROID STIM HORMONE: CPT

## 2023-03-25 LAB
ALBUMIN SERPL-MCNC: 3.6 G/DL (ref 3.5–5.2)
ALBUMIN/GLOB SERPL: 1.4 G/DL
ALP SERPL-CCNC: 59 U/L (ref 39–117)
ALT SERPL W P-5'-P-CCNC: 8 U/L (ref 1–33)
ANION GAP SERPL CALCULATED.3IONS-SCNC: 10 MMOL/L (ref 5–15)
AST SERPL-CCNC: 15 U/L (ref 1–32)
BASOPHILS # BLD AUTO: 0.08 10*3/MM3 (ref 0–0.2)
BASOPHILS NFR BLD AUTO: 0.8 % (ref 0–1.5)
BILIRUB SERPL-MCNC: 0.2 MG/DL (ref 0–1.2)
BUN SERPL-MCNC: 10 MG/DL (ref 8–23)
BUN/CREAT SERPL: 12.2 (ref 7–25)
CALCIUM SPEC-SCNC: 8.5 MG/DL (ref 8.6–10.5)
CHLORIDE SERPL-SCNC: 100 MMOL/L (ref 98–107)
CHOLEST SERPL-MCNC: 122 MG/DL (ref 0–200)
CO2 SERPL-SCNC: 25 MMOL/L (ref 22–29)
CREAT SERPL-MCNC: 0.82 MG/DL (ref 0.57–1)
DEPRECATED RDW RBC AUTO: 46 FL (ref 37–54)
EGFRCR SERPLBLD CKD-EPI 2021: 76.6 ML/MIN/1.73
EOSINOPHIL # BLD AUTO: 0.18 10*3/MM3 (ref 0–0.4)
EOSINOPHIL NFR BLD AUTO: 1.8 % (ref 0.3–6.2)
ERYTHROCYTE [DISTWIDTH] IN BLOOD BY AUTOMATED COUNT: 14.5 % (ref 12.3–15.4)
GLOBULIN UR ELPH-MCNC: 2.6 GM/DL
GLUCOSE SERPL-MCNC: 88 MG/DL (ref 65–99)
HCT VFR BLD AUTO: 36.3 % (ref 34–46.6)
HDLC SERPL-MCNC: 42 MG/DL (ref 40–60)
HGB BLD-MCNC: 11.7 G/DL (ref 12–15.9)
IMM GRANULOCYTES # BLD AUTO: 0.03 10*3/MM3 (ref 0–0.05)
IMM GRANULOCYTES NFR BLD AUTO: 0.3 % (ref 0–0.5)
LDLC SERPL CALC-MCNC: 66 MG/DL (ref 0–100)
LDLC/HDLC SERPL: 1.58 {RATIO}
LYMPHOCYTES # BLD AUTO: 2.54 10*3/MM3 (ref 0.7–3.1)
LYMPHOCYTES NFR BLD AUTO: 25.4 % (ref 19.6–45.3)
MCH RBC QN AUTO: 27.7 PG (ref 26.6–33)
MCHC RBC AUTO-ENTMCNC: 32.2 G/DL (ref 31.5–35.7)
MCV RBC AUTO: 85.8 FL (ref 79–97)
MONOCYTES # BLD AUTO: 0.61 10*3/MM3 (ref 0.1–0.9)
MONOCYTES NFR BLD AUTO: 6.1 % (ref 5–12)
NEUTROPHILS NFR BLD AUTO: 6.56 10*3/MM3 (ref 1.7–7)
NEUTROPHILS NFR BLD AUTO: 65.6 % (ref 42.7–76)
NRBC BLD AUTO-RTO: 0 /100 WBC (ref 0–0.2)
PLATELET # BLD AUTO: 318 10*3/MM3 (ref 140–450)
PMV BLD AUTO: 11.6 FL (ref 6–12)
POTASSIUM SERPL-SCNC: 4.9 MMOL/L (ref 3.5–5.2)
PROT SERPL-MCNC: 6.2 G/DL (ref 6–8.5)
RBC # BLD AUTO: 4.23 10*6/MM3 (ref 3.77–5.28)
SODIUM SERPL-SCNC: 135 MMOL/L (ref 136–145)
TRIGL SERPL-MCNC: 68 MG/DL (ref 0–150)
TSH SERPL DL<=0.05 MIU/L-ACNC: 1.06 UIU/ML (ref 0.27–4.2)
VLDLC SERPL-MCNC: 14 MG/DL (ref 5–40)
WBC NRBC COR # BLD: 10 10*3/MM3 (ref 3.4–10.8)

## 2023-03-26 ENCOUNTER — TRANSCRIBE ORDERS (OUTPATIENT)
Dept: ADMINISTRATIVE | Facility: HOSPITAL | Age: 73
End: 2023-03-26
Payer: MEDICARE

## 2023-03-26 ENCOUNTER — LAB (OUTPATIENT)
Dept: LAB | Facility: HOSPITAL | Age: 73
End: 2023-03-26
Payer: MEDICARE

## 2023-03-26 DIAGNOSIS — I43 NUTRITIONAL AND METABOLIC CARDIOMYOPATHY: Primary | ICD-10-CM

## 2023-03-26 DIAGNOSIS — E88.9 NUTRITIONAL AND METABOLIC CARDIOMYOPATHY: Primary | ICD-10-CM

## 2023-03-26 DIAGNOSIS — D50.9 IRON DEFICIENCY ANEMIA, UNSPECIFIED IRON DEFICIENCY ANEMIA TYPE: ICD-10-CM

## 2023-03-26 DIAGNOSIS — E63.9 NUTRITIONAL AND METABOLIC CARDIOMYOPATHY: Primary | ICD-10-CM

## 2023-03-26 DIAGNOSIS — D50.9 IRON DEFICIENCY ANEMIA, UNSPECIFIED IRON DEFICIENCY ANEMIA TYPE: Primary | ICD-10-CM

## 2023-03-26 LAB — HEMOCCULT STL QL: NEGATIVE

## 2023-03-26 PROCEDURE — 82272 OCCULT BLD FECES 1-3 TESTS: CPT

## 2023-04-17 ENCOUNTER — TRANSCRIBE ORDERS (OUTPATIENT)
Dept: ADMINISTRATIVE | Facility: HOSPITAL | Age: 73
End: 2023-04-17
Payer: MEDICARE

## 2023-04-17 DIAGNOSIS — F17.210 CIGARETTE SMOKER: Primary | ICD-10-CM

## 2023-05-22 ENCOUNTER — HOSPITAL ENCOUNTER (OUTPATIENT)
Dept: CT IMAGING | Facility: HOSPITAL | Age: 73
Discharge: HOME | End: 2023-05-22
Admitting: INTERNAL MEDICINE
Payer: MEDICARE

## 2023-05-22 DIAGNOSIS — F17.210 CIGARETTE SMOKER: ICD-10-CM

## 2023-05-22 PROCEDURE — 71271 CT THORAX LUNG CANCER SCR C-: CPT | Performed by: RADIOLOGY

## 2023-05-22 PROCEDURE — 71271 CT THORAX LUNG CANCER SCR C-: CPT

## 2023-10-01 ENCOUNTER — APPOINTMENT (OUTPATIENT)
Dept: GENERAL RADIOLOGY | Facility: HOSPITAL | Age: 73
End: 2023-10-01
Payer: MEDICARE

## 2023-10-01 ENCOUNTER — HOSPITAL ENCOUNTER (OUTPATIENT)
Facility: HOSPITAL | Age: 73
Setting detail: OBSERVATION
Discharge: HOME | End: 2023-10-02
Attending: EMERGENCY MEDICINE | Admitting: INTERNAL MEDICINE
Payer: MEDICARE

## 2023-10-01 DIAGNOSIS — J44.1 COPD EXACERBATION: Primary | ICD-10-CM

## 2023-10-01 DIAGNOSIS — R11.2 NAUSEA AND VOMITING, UNSPECIFIED VOMITING TYPE: ICD-10-CM

## 2023-10-01 DIAGNOSIS — R06.09 DYSPNEA ON EXERTION: ICD-10-CM

## 2023-10-01 LAB
A-A DO2: 26.4 MMHG (ref 0–300)
ALBUMIN SERPL-MCNC: 4.3 G/DL (ref 3.5–5.2)
ALBUMIN/GLOB SERPL: 1.5 G/DL
ALP SERPL-CCNC: 63 U/L (ref 39–117)
ALT SERPL W P-5'-P-CCNC: 14 U/L (ref 1–33)
ANION GAP SERPL CALCULATED.3IONS-SCNC: 12.1 MMOL/L (ref 5–15)
ARTERIAL PATENCY WRIST A: ABNORMAL
AST SERPL-CCNC: 26 U/L (ref 1–32)
ATMOSPHERIC PRESS: 732 MMHG
BASE EXCESS BLDA CALC-SCNC: 0.7 MMOL/L (ref 0–2)
BASOPHILS # BLD AUTO: 0.08 10*3/MM3 (ref 0–0.2)
BASOPHILS NFR BLD AUTO: 0.8 % (ref 0–1.5)
BDY SITE: ABNORMAL
BILIRUB SERPL-MCNC: 0.3 MG/DL (ref 0–1.2)
BUN SERPL-MCNC: 12 MG/DL (ref 8–23)
BUN/CREAT SERPL: 15.4 (ref 7–25)
CALCIUM SPEC-SCNC: 9.5 MG/DL (ref 8.6–10.5)
CHLORIDE SERPL-SCNC: 94 MMOL/L (ref 98–107)
CO2 BLDA-SCNC: 26.2 MMOL/L (ref 22–33)
CO2 SERPL-SCNC: 24.9 MMOL/L (ref 22–29)
COHGB MFR BLD: 1.3 % (ref 0–5)
CREAT SERPL-MCNC: 0.78 MG/DL (ref 0.57–1)
DEPRECATED RDW RBC AUTO: 46.5 FL (ref 37–54)
EGFRCR SERPLBLD CKD-EPI 2021: 81.3 ML/MIN/1.73
EOSINOPHIL # BLD AUTO: 0.02 10*3/MM3 (ref 0–0.4)
EOSINOPHIL NFR BLD AUTO: 0.2 % (ref 0.3–6.2)
ERYTHROCYTE [DISTWIDTH] IN BLOOD BY AUTOMATED COUNT: 14.3 % (ref 12.3–15.4)
GLOBULIN UR ELPH-MCNC: 2.9 GM/DL
GLUCOSE SERPL-MCNC: 97 MG/DL (ref 65–99)
HCO3 BLDA-SCNC: 25 MMOL/L (ref 20–26)
HCT VFR BLD AUTO: 42.6 % (ref 34–46.6)
HCT VFR BLD CALC: 39.2 % (ref 38–51)
HGB BLD-MCNC: 13.8 G/DL (ref 12–15.9)
HGB BLDA-MCNC: 12.8 G/DL (ref 13.5–17.5)
IMM GRANULOCYTES # BLD AUTO: 0.05 10*3/MM3 (ref 0–0.05)
IMM GRANULOCYTES NFR BLD AUTO: 0.5 % (ref 0–0.5)
INHALED O2 CONCENTRATION: 21 %
LYMPHOCYTES # BLD AUTO: 1.38 10*3/MM3 (ref 0.7–3.1)
LYMPHOCYTES NFR BLD AUTO: 13.6 % (ref 19.6–45.3)
Lab: ABNORMAL
MCH RBC QN AUTO: 28.4 PG (ref 26.6–33)
MCHC RBC AUTO-ENTMCNC: 32.4 G/DL (ref 31.5–35.7)
MCV RBC AUTO: 87.7 FL (ref 79–97)
METHGB BLD QL: 0 % (ref 0–3)
MODALITY: ABNORMAL
MONOCYTES # BLD AUTO: 0.63 10*3/MM3 (ref 0.1–0.9)
MONOCYTES NFR BLD AUTO: 6.2 % (ref 5–12)
NEUTROPHILS NFR BLD AUTO: 78.7 % (ref 42.7–76)
NEUTROPHILS NFR BLD AUTO: 8 10*3/MM3 (ref 1.7–7)
NRBC BLD AUTO-RTO: 0 /100 WBC (ref 0–0.2)
NT-PROBNP SERPL-MCNC: 2170 PG/ML (ref 0–900)
OXYHGB MFR BLDV: 94.7 % (ref 94–99)
PCO2 BLDA: 38.3 MM HG (ref 35–45)
PCO2 TEMP ADJ BLD: ABNORMAL MM[HG]
PH BLDA: 7.42 PH UNITS (ref 7.35–7.45)
PH, TEMP CORRECTED: ABNORMAL
PLATELET # BLD AUTO: 311 10*3/MM3 (ref 140–450)
PMV BLD AUTO: 10.2 FL (ref 6–12)
PO2 BLDA: 74.1 MM HG (ref 83–108)
PO2 TEMP ADJ BLD: ABNORMAL MM[HG]
POTASSIUM SERPL-SCNC: 4.7 MMOL/L (ref 3.5–5.2)
PROT SERPL-MCNC: 7.2 G/DL (ref 6–8.5)
RBC # BLD AUTO: 4.86 10*6/MM3 (ref 3.77–5.28)
SAO2 % BLDCOA: 95.8 % (ref 94–99)
SARS-COV-2 RNA RESP QL NAA+PROBE: NOT DETECTED
SODIUM SERPL-SCNC: 131 MMOL/L (ref 136–145)
TROPONIN T SERPL HS-MCNC: 8 NG/L
VENTILATOR MODE: ABNORMAL
WBC NRBC COR # BLD: 10.16 10*3/MM3 (ref 3.4–10.8)

## 2023-10-01 PROCEDURE — 71045 X-RAY EXAM CHEST 1 VIEW: CPT | Performed by: RADIOLOGY

## 2023-10-01 PROCEDURE — 94799 UNLISTED PULMONARY SVC/PX: CPT

## 2023-10-01 PROCEDURE — 80053 COMPREHEN METABOLIC PANEL: CPT | Performed by: EMERGENCY MEDICINE

## 2023-10-01 PROCEDURE — 82375 ASSAY CARBOXYHB QUANT: CPT

## 2023-10-01 PROCEDURE — 99284 EMERGENCY DEPT VISIT MOD MDM: CPT

## 2023-10-01 PROCEDURE — 87635 SARS-COV-2 COVID-19 AMP PRB: CPT | Performed by: EMERGENCY MEDICINE

## 2023-10-01 PROCEDURE — 63710000001 ONDANSETRON ODT 4 MG TABLET DISPERSIBLE: Performed by: EMERGENCY MEDICINE

## 2023-10-01 PROCEDURE — 94640 AIRWAY INHALATION TREATMENT: CPT

## 2023-10-01 PROCEDURE — 84484 ASSAY OF TROPONIN QUANT: CPT | Performed by: EMERGENCY MEDICINE

## 2023-10-01 PROCEDURE — 36600 WITHDRAWAL OF ARTERIAL BLOOD: CPT

## 2023-10-01 PROCEDURE — 71045 X-RAY EXAM CHEST 1 VIEW: CPT

## 2023-10-01 PROCEDURE — 93005 ELECTROCARDIOGRAM TRACING: CPT

## 2023-10-01 PROCEDURE — 85025 COMPLETE CBC W/AUTO DIFF WBC: CPT | Performed by: EMERGENCY MEDICINE

## 2023-10-01 PROCEDURE — 36415 COLL VENOUS BLD VENIPUNCTURE: CPT

## 2023-10-01 PROCEDURE — 87040 BLOOD CULTURE FOR BACTERIA: CPT | Performed by: EMERGENCY MEDICINE

## 2023-10-01 PROCEDURE — 83880 ASSAY OF NATRIURETIC PEPTIDE: CPT | Performed by: EMERGENCY MEDICINE

## 2023-10-01 PROCEDURE — 82805 BLOOD GASES W/O2 SATURATION: CPT

## 2023-10-01 PROCEDURE — G0378 HOSPITAL OBSERVATION PER HR: HCPCS

## 2023-10-01 PROCEDURE — 83050 HGB METHEMOGLOBIN QUAN: CPT

## 2023-10-01 RX ORDER — UMECLIDINIUM BROMIDE AND VILANTEROL TRIFENATATE 62.5; 25 UG/1; UG/1
1 POWDER RESPIRATORY (INHALATION)
COMMUNITY

## 2023-10-01 RX ORDER — HYDROCODONE BITARTRATE AND ACETAMINOPHEN 10; 325 MG/1; MG/1
1 TABLET ORAL EVERY 6 HOURS PRN
COMMUNITY

## 2023-10-01 RX ORDER — IPRATROPIUM BROMIDE AND ALBUTEROL SULFATE 2.5; .5 MG/3ML; MG/3ML
3 SOLUTION RESPIRATORY (INHALATION) ONCE
Status: COMPLETED | OUTPATIENT
Start: 2023-10-01 | End: 2023-10-01

## 2023-10-01 RX ORDER — OXYBUTYNIN CHLORIDE 5 MG/1
5 TABLET ORAL 2 TIMES DAILY
COMMUNITY

## 2023-10-01 RX ORDER — ONDANSETRON 2 MG/ML
4 INJECTION INTRAMUSCULAR; INTRAVENOUS EVERY 6 HOURS PRN
Status: DISCONTINUED | OUTPATIENT
Start: 2023-10-01 | End: 2023-10-02 | Stop reason: HOSPADM

## 2023-10-01 RX ORDER — IPRATROPIUM BROMIDE AND ALBUTEROL SULFATE 2.5; .5 MG/3ML; MG/3ML
3 SOLUTION RESPIRATORY (INHALATION)
Status: DISCONTINUED | OUTPATIENT
Start: 2023-10-01 | End: 2023-10-02 | Stop reason: HOSPADM

## 2023-10-01 RX ORDER — ASPIRIN 81 MG/1
81 TABLET ORAL DAILY
COMMUNITY

## 2023-10-01 RX ORDER — ALBUTEROL SULFATE 2.5 MG/3ML
2.5 SOLUTION RESPIRATORY (INHALATION) EVERY 6 HOURS PRN
COMMUNITY

## 2023-10-01 RX ORDER — LISINOPRIL 10 MG/1
10 TABLET ORAL DAILY
COMMUNITY

## 2023-10-01 RX ORDER — SODIUM CHLORIDE 9 MG/ML
75 INJECTION, SOLUTION INTRAVENOUS CONTINUOUS
Status: DISCONTINUED | OUTPATIENT
Start: 2023-10-01 | End: 2023-10-02 | Stop reason: HOSPADM

## 2023-10-01 RX ORDER — HYDRALAZINE HYDROCHLORIDE 20 MG/ML
10 INJECTION INTRAMUSCULAR; INTRAVENOUS EVERY 6 HOURS PRN
Status: DISCONTINUED | OUTPATIENT
Start: 2023-10-01 | End: 2023-10-02 | Stop reason: HOSPADM

## 2023-10-01 RX ORDER — ATORVASTATIN CALCIUM 20 MG/1
20 TABLET, FILM COATED ORAL DAILY
COMMUNITY

## 2023-10-01 RX ORDER — OMEPRAZOLE 20 MG/1
20 CAPSULE, DELAYED RELEASE ORAL 2 TIMES DAILY PRN
COMMUNITY

## 2023-10-01 RX ORDER — METHYLPREDNISOLONE SODIUM SUCCINATE 40 MG/ML
40 INJECTION, POWDER, LYOPHILIZED, FOR SOLUTION INTRAMUSCULAR; INTRAVENOUS EVERY 8 HOURS
Status: DISCONTINUED | OUTPATIENT
Start: 2023-10-02 | End: 2023-10-02 | Stop reason: HOSPADM

## 2023-10-01 RX ORDER — ONDANSETRON 4 MG/1
4 TABLET, ORALLY DISINTEGRATING ORAL ONCE
Status: COMPLETED | OUTPATIENT
Start: 2023-10-01 | End: 2023-10-01

## 2023-10-01 RX ORDER — ALBUTEROL SULFATE 90 UG/1
2 AEROSOL, METERED RESPIRATORY (INHALATION) EVERY 6 HOURS PRN
COMMUNITY

## 2023-10-01 RX ORDER — SODIUM CHLORIDE 0.9 % (FLUSH) 0.9 %
10 SYRINGE (ML) INJECTION AS NEEDED
Status: DISCONTINUED | OUTPATIENT
Start: 2023-10-01 | End: 2023-10-02 | Stop reason: HOSPADM

## 2023-10-01 RX ORDER — FAMOTIDINE 20 MG/1
20 TABLET, FILM COATED ORAL ONCE
Status: COMPLETED | OUTPATIENT
Start: 2023-10-01 | End: 2023-10-01

## 2023-10-01 RX ADMIN — ONDANSETRON 4 MG: 4 TABLET, ORALLY DISINTEGRATING ORAL at 22:35

## 2023-10-01 RX ADMIN — IPRATROPIUM BROMIDE AND ALBUTEROL SULFATE 3 ML: .5; 2.5 SOLUTION RESPIRATORY (INHALATION) at 21:31

## 2023-10-01 RX ADMIN — FAMOTIDINE 20 MG: 20 TABLET, FILM COATED ORAL at 22:35

## 2023-10-02 ENCOUNTER — APPOINTMENT (OUTPATIENT)
Dept: CARDIOLOGY | Facility: HOSPITAL | Age: 73
End: 2023-10-02
Payer: MEDICARE

## 2023-10-02 VITALS
DIASTOLIC BLOOD PRESSURE: 72 MMHG | TEMPERATURE: 98.2 F | OXYGEN SATURATION: 94 % | WEIGHT: 105 LBS | RESPIRATION RATE: 18 BRPM | HEART RATE: 86 BPM | BODY MASS INDEX: 19.83 KG/M2 | SYSTOLIC BLOOD PRESSURE: 149 MMHG | HEIGHT: 61 IN

## 2023-10-02 PROBLEM — R11.2 INTRACTABLE VOMITING WITH NAUSEA: Status: RESOLVED | Noted: 2023-10-01 | Resolved: 2023-10-02

## 2023-10-02 LAB
ANION GAP SERPL CALCULATED.3IONS-SCNC: 12.1 MMOL/L (ref 5–15)
BASOPHILS # BLD AUTO: 0.03 10*3/MM3 (ref 0–0.2)
BASOPHILS NFR BLD AUTO: 0.4 % (ref 0–1.5)
BH CV ECHO MEAS - ACS: 1.4 CM
BH CV ECHO MEAS - AO MAX PG: 9.1 MMHG
BH CV ECHO MEAS - AO MEAN PG: 4 MMHG
BH CV ECHO MEAS - AO ROOT DIAM: 2.7 CM
BH CV ECHO MEAS - AO V2 MAX: 151 CM/SEC
BH CV ECHO MEAS - AO V2 VTI: 29.8 CM
BH CV ECHO MEAS - EDV(CUBED): 68.9 ML
BH CV ECHO MEAS - EDV(MOD-SP4): 41.9 ML
BH CV ECHO MEAS - EF(MOD-SP4): 66.1 %
BH CV ECHO MEAS - ESV(CUBED): 13.8 ML
BH CV ECHO MEAS - ESV(MOD-SP4): 14.2 ML
BH CV ECHO MEAS - FS: 41.5 %
BH CV ECHO MEAS - IVS/LVPW: 0.89 CM
BH CV ECHO MEAS - IVSD: 0.8 CM
BH CV ECHO MEAS - LA DIMENSION: 3.9 CM
BH CV ECHO MEAS - LAT PEAK E' VEL: 8.8 CM/SEC
BH CV ECHO MEAS - LV DIASTOLIC VOL/BSA (35-75): 29.2 CM2
BH CV ECHO MEAS - LV MASS(C)D: 105.6 GRAMS
BH CV ECHO MEAS - LV SYSTOLIC VOL/BSA (12-30): 9.9 CM2
BH CV ECHO MEAS - LVIDD: 4.1 CM
BH CV ECHO MEAS - LVIDS: 2.4 CM
BH CV ECHO MEAS - LVOT AREA: 2.8 CM2
BH CV ECHO MEAS - LVOT DIAM: 1.9 CM
BH CV ECHO MEAS - LVPWD: 0.9 CM
BH CV ECHO MEAS - MED PEAK E' VEL: 6.9 CM/SEC
BH CV ECHO MEAS - MV A MAX VEL: 98.1 CM/SEC
BH CV ECHO MEAS - MV DEC SLOPE: 489 CM/SEC2
BH CV ECHO MEAS - MV DEC TIME: 0.19 SEC
BH CV ECHO MEAS - MV E MAX VEL: 94.7 CM/SEC
BH CV ECHO MEAS - MV E/A: 0.97
BH CV ECHO MEAS - PA ACC TIME: 0.12 SEC
BH CV ECHO MEAS - RAP SYSTOLE: 10 MMHG
BH CV ECHO MEAS - RVSP: 46.2 MMHG
BH CV ECHO MEAS - SI(MOD-SP4): 19.3 ML/M2
BH CV ECHO MEAS - SV(MOD-SP4): 27.7 ML
BH CV ECHO MEAS - TAPSE (>1.6): 2.06 CM
BH CV ECHO MEAS - TR MAX PG: 36.2 MMHG
BH CV ECHO MEAS - TR MAX VEL: 301 CM/SEC
BH CV ECHO MEASUREMENTS AVERAGE E/E' RATIO: 12.06
BUN SERPL-MCNC: 16 MG/DL (ref 8–23)
BUN/CREAT SERPL: 19.8 (ref 7–25)
CALCIUM SPEC-SCNC: 8.9 MG/DL (ref 8.6–10.5)
CHLORIDE SERPL-SCNC: 95 MMOL/L (ref 98–107)
CO2 SERPL-SCNC: 20.9 MMOL/L (ref 22–29)
CREAT SERPL-MCNC: 0.81 MG/DL (ref 0.57–1)
DEPRECATED RDW RBC AUTO: 46.2 FL (ref 37–54)
EGFRCR SERPLBLD CKD-EPI 2021: 77.7 ML/MIN/1.73
EOSINOPHIL # BLD AUTO: 0.05 10*3/MM3 (ref 0–0.4)
EOSINOPHIL NFR BLD AUTO: 0.7 % (ref 0.3–6.2)
ERYTHROCYTE [DISTWIDTH] IN BLOOD BY AUTOMATED COUNT: 14.5 % (ref 12.3–15.4)
GEN 5 2HR TROPONIN T REFLEX: 7 NG/L
GLUCOSE SERPL-MCNC: 102 MG/DL (ref 65–99)
HCT VFR BLD AUTO: 40.5 % (ref 34–46.6)
HGB BLD-MCNC: 13.3 G/DL (ref 12–15.9)
IMM GRANULOCYTES # BLD AUTO: 0.04 10*3/MM3 (ref 0–0.05)
IMM GRANULOCYTES NFR BLD AUTO: 0.5 % (ref 0–0.5)
LEFT ATRIUM VOLUME INDEX: 31.5 ML/M2
LYMPHOCYTES # BLD AUTO: 0.89 10*3/MM3 (ref 0.7–3.1)
LYMPHOCYTES NFR BLD AUTO: 12.1 % (ref 19.6–45.3)
MCH RBC QN AUTO: 28.4 PG (ref 26.6–33)
MCHC RBC AUTO-ENTMCNC: 32.8 G/DL (ref 31.5–35.7)
MCV RBC AUTO: 86.5 FL (ref 79–97)
MONOCYTES # BLD AUTO: 0.08 10*3/MM3 (ref 0.1–0.9)
MONOCYTES NFR BLD AUTO: 1.1 % (ref 5–12)
NEUTROPHILS NFR BLD AUTO: 6.25 10*3/MM3 (ref 1.7–7)
NEUTROPHILS NFR BLD AUTO: 85.2 % (ref 42.7–76)
NRBC BLD AUTO-RTO: 0 /100 WBC (ref 0–0.2)
PLATELET # BLD AUTO: 274 10*3/MM3 (ref 140–450)
PMV BLD AUTO: 11.7 FL (ref 6–12)
POTASSIUM SERPL-SCNC: 5.1 MMOL/L (ref 3.5–5.2)
QT INTERVAL: 450 MS
QTC INTERVAL: 445 MS
RBC # BLD AUTO: 4.68 10*6/MM3 (ref 3.77–5.28)
SODIUM SERPL-SCNC: 128 MMOL/L (ref 136–145)
TROPONIN T DELTA: -1 NG/L
WBC NRBC COR # BLD: 7.34 10*3/MM3 (ref 3.4–10.8)

## 2023-10-02 PROCEDURE — 94761 N-INVAS EAR/PLS OXIMETRY MLT: CPT

## 2023-10-02 PROCEDURE — 93306 TTE W/DOPPLER COMPLETE: CPT | Performed by: INTERNAL MEDICINE

## 2023-10-02 PROCEDURE — G0378 HOSPITAL OBSERVATION PER HR: HCPCS

## 2023-10-02 PROCEDURE — 87070 CULTURE OTHR SPECIMN AEROBIC: CPT | Performed by: STUDENT IN AN ORGANIZED HEALTH CARE EDUCATION/TRAINING PROGRAM

## 2023-10-02 PROCEDURE — 96361 HYDRATE IV INFUSION ADD-ON: CPT

## 2023-10-02 PROCEDURE — 80048 BASIC METABOLIC PNL TOTAL CA: CPT | Performed by: STUDENT IN AN ORGANIZED HEALTH CARE EDUCATION/TRAINING PROGRAM

## 2023-10-02 PROCEDURE — 87205 SMEAR GRAM STAIN: CPT | Performed by: STUDENT IN AN ORGANIZED HEALTH CARE EDUCATION/TRAINING PROGRAM

## 2023-10-02 PROCEDURE — 94799 UNLISTED PULMONARY SVC/PX: CPT

## 2023-10-02 PROCEDURE — 94664 DEMO&/EVAL PT USE INHALER: CPT

## 2023-10-02 PROCEDURE — 84484 ASSAY OF TROPONIN QUANT: CPT | Performed by: EMERGENCY MEDICINE

## 2023-10-02 PROCEDURE — 25010000002 HEPARIN (PORCINE) PER 1000 UNITS: Performed by: STUDENT IN AN ORGANIZED HEALTH CARE EDUCATION/TRAINING PROGRAM

## 2023-10-02 PROCEDURE — 25010000002 METHYLPREDNISOLONE PER 40 MG: Performed by: STUDENT IN AN ORGANIZED HEALTH CARE EDUCATION/TRAINING PROGRAM

## 2023-10-02 PROCEDURE — 96376 TX/PRO/DX INJ SAME DRUG ADON: CPT

## 2023-10-02 PROCEDURE — 96374 THER/PROPH/DIAG INJ IV PUSH: CPT

## 2023-10-02 PROCEDURE — 85025 COMPLETE CBC W/AUTO DIFF WBC: CPT | Performed by: STUDENT IN AN ORGANIZED HEALTH CARE EDUCATION/TRAINING PROGRAM

## 2023-10-02 PROCEDURE — 93306 TTE W/DOPPLER COMPLETE: CPT

## 2023-10-02 PROCEDURE — 96372 THER/PROPH/DIAG INJ SC/IM: CPT

## 2023-10-02 RX ORDER — MELOXICAM 7.5 MG/1
7.5 TABLET ORAL DAILY
Status: CANCELLED | OUTPATIENT
Start: 2023-10-02

## 2023-10-02 RX ORDER — OXYBUTYNIN CHLORIDE 5 MG/1
5 TABLET ORAL 2 TIMES DAILY
Status: DISCONTINUED | OUTPATIENT
Start: 2023-10-02 | End: 2023-10-02 | Stop reason: HOSPADM

## 2023-10-02 RX ORDER — ATORVASTATIN CALCIUM 20 MG/1
20 TABLET, FILM COATED ORAL DAILY
Status: DISCONTINUED | OUTPATIENT
Start: 2023-10-02 | End: 2023-10-02 | Stop reason: HOSPADM

## 2023-10-02 RX ORDER — AMOXICILLIN 250 MG
2 CAPSULE ORAL 2 TIMES DAILY
Status: DISCONTINUED | OUTPATIENT
Start: 2023-10-02 | End: 2023-10-02 | Stop reason: HOSPADM

## 2023-10-02 RX ORDER — HYDROCODONE BITARTRATE AND ACETAMINOPHEN 10; 325 MG/1; MG/1
1 TABLET ORAL EVERY 6 HOURS PRN
Status: DISCONTINUED | OUTPATIENT
Start: 2023-10-02 | End: 2023-10-02 | Stop reason: HOSPADM

## 2023-10-02 RX ORDER — SODIUM CHLORIDE 0.9 % (FLUSH) 0.9 %
10 SYRINGE (ML) INJECTION AS NEEDED
Status: DISCONTINUED | OUTPATIENT
Start: 2023-10-02 | End: 2023-10-02 | Stop reason: HOSPADM

## 2023-10-02 RX ORDER — GABAPENTIN 400 MG/1
800 CAPSULE ORAL EVERY 8 HOURS SCHEDULED
Status: DISCONTINUED | OUTPATIENT
Start: 2023-10-02 | End: 2023-10-02 | Stop reason: HOSPADM

## 2023-10-02 RX ORDER — PANTOPRAZOLE SODIUM 40 MG/1
40 TABLET, DELAYED RELEASE ORAL
Status: DISCONTINUED | OUTPATIENT
Start: 2023-10-02 | End: 2023-10-02 | Stop reason: HOSPADM

## 2023-10-02 RX ORDER — ALBUTEROL SULFATE 2.5 MG/3ML
2.5 SOLUTION RESPIRATORY (INHALATION) EVERY 6 HOURS PRN
Status: DISCONTINUED | OUTPATIENT
Start: 2023-10-02 | End: 2023-10-02 | Stop reason: HOSPADM

## 2023-10-02 RX ORDER — NITROGLYCERIN 0.4 MG/1
0.4 TABLET SUBLINGUAL
Status: DISCONTINUED | OUTPATIENT
Start: 2023-10-02 | End: 2023-10-02 | Stop reason: HOSPADM

## 2023-10-02 RX ORDER — ALPRAZOLAM 1 MG/1
1 TABLET ORAL 2 TIMES DAILY
Status: DISCONTINUED | OUTPATIENT
Start: 2023-10-02 | End: 2023-10-02 | Stop reason: HOSPADM

## 2023-10-02 RX ORDER — POLYETHYLENE GLYCOL 3350 17 G/17G
17 POWDER, FOR SOLUTION ORAL DAILY PRN
Status: DISCONTINUED | OUTPATIENT
Start: 2023-10-02 | End: 2023-10-02 | Stop reason: HOSPADM

## 2023-10-02 RX ORDER — LISINOPRIL 10 MG/1
10 TABLET ORAL DAILY
Status: DISCONTINUED | OUTPATIENT
Start: 2023-10-02 | End: 2023-10-02 | Stop reason: HOSPADM

## 2023-10-02 RX ORDER — HEPARIN SODIUM 5000 [USP'U]/ML
5000 INJECTION, SOLUTION INTRAVENOUS; SUBCUTANEOUS EVERY 12 HOURS SCHEDULED
Status: DISCONTINUED | OUTPATIENT
Start: 2023-10-02 | End: 2023-10-02 | Stop reason: HOSPADM

## 2023-10-02 RX ORDER — PREDNISONE 10 MG/1
TABLET ORAL
Qty: 14 TABLET | Refills: 0 | Status: SHIPPED | OUTPATIENT
Start: 2023-10-02 | End: 2023-10-08

## 2023-10-02 RX ORDER — BISACODYL 5 MG/1
5 TABLET, DELAYED RELEASE ORAL DAILY PRN
Status: DISCONTINUED | OUTPATIENT
Start: 2023-10-02 | End: 2023-10-02 | Stop reason: HOSPADM

## 2023-10-02 RX ORDER — AZITHROMYCIN 250 MG/1
250 TABLET, FILM COATED ORAL DAILY
Qty: 3 TABLET | Refills: 0 | Status: SHIPPED | OUTPATIENT
Start: 2023-10-02 | End: 2023-10-05

## 2023-10-02 RX ORDER — SODIUM CHLORIDE 9 MG/ML
40 INJECTION, SOLUTION INTRAVENOUS AS NEEDED
Status: DISCONTINUED | OUTPATIENT
Start: 2023-10-02 | End: 2023-10-02 | Stop reason: HOSPADM

## 2023-10-02 RX ORDER — SODIUM CHLORIDE 0.9 % (FLUSH) 0.9 %
10 SYRINGE (ML) INJECTION EVERY 12 HOURS SCHEDULED
Status: DISCONTINUED | OUTPATIENT
Start: 2023-10-02 | End: 2023-10-02 | Stop reason: HOSPADM

## 2023-10-02 RX ORDER — BISACODYL 10 MG
10 SUPPOSITORY, RECTAL RECTAL DAILY PRN
Status: DISCONTINUED | OUTPATIENT
Start: 2023-10-02 | End: 2023-10-02 | Stop reason: HOSPADM

## 2023-10-02 RX ORDER — IPRATROPIUM BROMIDE AND ALBUTEROL SULFATE 2.5; .5 MG/3ML; MG/3ML
3 SOLUTION RESPIRATORY (INHALATION)
Status: CANCELLED | OUTPATIENT
Start: 2023-10-02

## 2023-10-02 RX ORDER — ASPIRIN 81 MG/1
81 TABLET ORAL DAILY
Status: DISCONTINUED | OUTPATIENT
Start: 2023-10-02 | End: 2023-10-02 | Stop reason: HOSPADM

## 2023-10-02 RX ADMIN — GABAPENTIN 800 MG: 400 CAPSULE ORAL at 14:34

## 2023-10-02 RX ADMIN — METHYLPREDNISOLONE SODIUM SUCCINATE 40 MG: 40 INJECTION, POWDER, FOR SOLUTION INTRAMUSCULAR; INTRAVENOUS at 15:25

## 2023-10-02 RX ADMIN — IPRATROPIUM BROMIDE AND ALBUTEROL SULFATE 3 ML: 2.5; .5 SOLUTION RESPIRATORY (INHALATION) at 06:50

## 2023-10-02 RX ADMIN — SODIUM CHLORIDE 75 ML/HR: 9 INJECTION, SOLUTION INTRAVENOUS at 14:34

## 2023-10-02 RX ADMIN — METHYLPREDNISOLONE SODIUM SUCCINATE 40 MG: 40 INJECTION, POWDER, FOR SOLUTION INTRAMUSCULAR; INTRAVENOUS at 00:17

## 2023-10-02 RX ADMIN — HEPARIN SODIUM 5000 UNITS: 5000 INJECTION INTRAVENOUS; SUBCUTANEOUS at 09:37

## 2023-10-02 RX ADMIN — HYDROCODONE BITARTRATE AND ACETAMINOPHEN 1 TABLET: 10; 325 TABLET ORAL at 17:30

## 2023-10-02 RX ADMIN — HYDROCODONE BITARTRATE AND ACETAMINOPHEN 1 TABLET: 10; 325 TABLET ORAL at 04:24

## 2023-10-02 RX ADMIN — PANTOPRAZOLE SODIUM 40 MG: 40 TABLET, DELAYED RELEASE ORAL at 06:15

## 2023-10-02 RX ADMIN — IPRATROPIUM BROMIDE AND ALBUTEROL SULFATE 3 ML: 2.5; .5 SOLUTION RESPIRATORY (INHALATION) at 01:03

## 2023-10-02 RX ADMIN — METHYLPREDNISOLONE SODIUM SUCCINATE 40 MG: 40 INJECTION, POWDER, FOR SOLUTION INTRAMUSCULAR; INTRAVENOUS at 00:24

## 2023-10-02 RX ADMIN — METHYLPREDNISOLONE SODIUM SUCCINATE 40 MG: 40 INJECTION, POWDER, FOR SOLUTION INTRAMUSCULAR; INTRAVENOUS at 09:37

## 2023-10-02 RX ADMIN — ATORVASTATIN CALCIUM 20 MG: 20 TABLET, FILM COATED ORAL at 09:38

## 2023-10-02 RX ADMIN — SODIUM CHLORIDE 75 ML/HR: 9 INJECTION, SOLUTION INTRAVENOUS at 00:18

## 2023-10-02 RX ADMIN — HEPARIN SODIUM 5000 UNITS: 5000 INJECTION INTRAVENOUS; SUBCUTANEOUS at 02:38

## 2023-10-02 RX ADMIN — ALPRAZOLAM 1 MG: 1 TABLET ORAL at 09:38

## 2023-10-02 RX ADMIN — GABAPENTIN 800 MG: 400 CAPSULE ORAL at 06:15

## 2023-10-02 RX ADMIN — LISINOPRIL 10 MG: 10 TABLET ORAL at 09:38

## 2023-10-02 RX ADMIN — ASPIRIN 81 MG: 81 TABLET, COATED ORAL at 09:38

## 2023-10-02 RX ADMIN — IPRATROPIUM BROMIDE AND ALBUTEROL SULFATE 3 ML: 2.5; .5 SOLUTION RESPIRATORY (INHALATION) at 12:43

## 2023-10-02 RX ADMIN — Medication 10 ML: at 09:39

## 2023-10-02 RX ADMIN — OXYBUTYNIN CHLORIDE 5 MG: 5 TABLET ORAL at 09:38

## 2023-10-02 NOTE — ED PROVIDER NOTES
Subjective   History of Present Illness  Patient is a 72-year-old female with cough and productive foamy sputum for the last 7 days.  For the last few days has had associated nausea vomiting.  Also complaining of epigastric pain intermittently.  Subjective fever.  Denies diarrhea chest pain diaphoresis syncope.  Past medical history of COPD hypertension hyperlipidemia.  On baby aspirin only.  No abdominal surgery in the past.  Does smoke does not drink.  No allergies    Review of Systems   Constitutional: Negative.    HENT:  Positive for congestion.    Eyes: Negative.    Respiratory:  Positive for cough and shortness of breath.    Cardiovascular: Negative.  Negative for chest pain, palpitations and leg swelling.   Gastrointestinal:  Positive for abdominal pain, nausea and vomiting. Negative for abdominal distention and diarrhea.   Endocrine: Negative.    Genitourinary: Negative.    Musculoskeletal: Negative.    Skin: Negative.    Allergic/Immunologic: Negative.    Neurological: Negative.    Hematological: Negative.    All other systems reviewed and are negative.    Past Medical History:   Diagnosis Date    Arthritis     COPD (chronic obstructive pulmonary disease)     Elevated cholesterol     High cholesterol     History of transfusion     Hyperlipidemia     Hypertension        No Known Allergies    Past Surgical History:   Procedure Laterality Date    BREAST BIOPSY Right     yrs ago benign    COLONOSCOPY      COLONOSCOPY N/A 5/10/2022    Procedure: COLONOSCOPY;  Surgeon: Kenia Braun MD;  Location: [facility];  Service: Gastroenterology;  Laterality: N/A;    ENDOSCOPY      ENDOSCOPY N/A 5/10/2022    Procedure: ESOPHAGOGASTRODUODENOSCOPY WITH BIOPSY;  Surgeon: Kenia Braun MD;  Location: [facility];  Service: Gastroenterology;  Laterality: N/A;    HIP SURGERY Right        Family History   Problem Relation Age of Onset    No Known Problems Mother     No Known Problems Father     No Known  Problems Sister     No Known Problems Brother     No Known Problems Son     No Known Problems Daughter     No Known Problems Maternal Grandmother     No Known Problems Maternal Grandfather     No Known Problems Paternal Grandmother     No Known Problems Paternal Grandfather     No Known Problems Cousin     Rheum arthritis Neg Hx     Osteoarthritis Neg Hx     Asthma Neg Hx     Diabetes Neg Hx     Heart failure Neg Hx     Hyperlipidemia Neg Hx     Hypertension Neg Hx     Migraines Neg Hx     Rashes / Skin problems Neg Hx     Seizures Neg Hx     Stroke Neg Hx     Thyroid disease Neg Hx     Breast cancer Neg Hx        Social History     Socioeconomic History    Marital status:    Tobacco Use    Smoking status: Every Day     Packs/day: 1.00     Years: 30.00     Pack years: 30.00     Types: Cigarettes    Smokeless tobacco: Never   Vaping Use    Vaping Use: Never used   Substance and Sexual Activity    Alcohol use: No    Drug use: No    Sexual activity: Defer           Objective   Physical Exam  Vitals and nursing note reviewed.   Constitutional:       Appearance: She is well-developed.   HENT:      Head: Normocephalic and atraumatic.      Mouth/Throat:      Mouth: Mucous membranes are moist.      Pharynx: Oropharynx is clear.   Eyes:      Extraocular Movements: Extraocular movements intact.      Pupils: Pupils are equal, round, and reactive to light.   Cardiovascular:      Rate and Rhythm: Normal rate and regular rhythm.   Pulmonary:      Effort: Pulmonary effort is normal.      Breath sounds: Examination of the right-lower field reveals rhonchi and rales. Examination of the left-lower field reveals rhonchi and rales.   Chest:      Chest wall: No tenderness.   Abdominal:      General: Bowel sounds are normal.      Palpations: Abdomen is soft.   Musculoskeletal:         General: Normal range of motion.      Cervical back: Normal range of motion and neck supple.      Right lower leg: No tenderness. No edema.       Left lower leg: No tenderness. No edema.   Skin:     General: Skin is warm and dry.      Capillary Refill: Capillary refill takes less than 2 seconds.   Neurological:      General: No focal deficit present.      Mental Status: She is alert.       Procedures           ED Course  ED Course as of 10/01/23 2310   Sun Oct 01, 2023   2208 EKG reveals a sinus bradycardia at 59.  UT interval 136.  .  No ST elevation noted.  Electronically signed by Harshad Diaz MD, 10/01/23, 10:08 PM EDT.  [MD]      ED Course User Index  [MD] Harshad Diaz MD      EG reveals a sinus bradycardia at 59.  UT interval 136.  .  No ST elevation noted.  Nonspecific ST abnormalities.  Electronically signed by Harshad Diaz MD, 10/01/23, 9:10 PM EDT.                                      Medical Decision Making  Shared decision making employed.  Patient continues to have episodes of vomiting which respond briefly to Zofran.  Patient also continues have dyspnea.  Exacerbation of COPD but also elevation of BNP.  We will contact hospitalist for possible admission.    Problems Addressed:  COPD exacerbation: complicated acute illness or injury  Dyspnea on exertion: complicated acute illness or injury  Nausea and vomiting, unspecified vomiting type: complicated acute illness or injury    Amount and/or Complexity of Data Reviewed  Labs: ordered.  Radiology: ordered.  ECG/medicine tests: ordered.    Risk  Prescription drug management.  Decision regarding hospitalization.        Final diagnoses:   COPD exacerbation   Nausea and vomiting, unspecified vomiting type   Dyspnea on exertion       ED Disposition  ED Disposition       ED Disposition   Decision to Admit    Condition   --    Comment   --               No follow-up provider specified.       Medication List      No changes were made to your prescriptions during this visit.            Harshad Diaz MD  10/01/23 2310

## 2023-10-02 NOTE — PLAN OF CARE
Goal Outcome Evaluation:         Pt is resting in bed at this time. Pt arrived from ER this shift. No concerns or complaints at this time. Will continue POC.

## 2023-10-02 NOTE — H&P
[facility]    Patient Identification:  Name:  Dorothy Cespedes  Age:  72 y.o.  Sex:  female  :  1950  MRN:  [id number]   Visit Number:  [id number]  Admit Date: 10/1/2023   Room number:  H102/H2  Primary Care Physician:  Johann Juárez MD    Date of Admission: 10/1/2023     Subjective     Chief complaint:    Chief Complaint   Patient presents with    Nausea    Vomiting    Shortness of Breath       History of presenting illness:    This is a 72-year-old female with a past medical history of COPD, hyperlipidemia, hypertension, arthritis and anxiety presenting with shortness of breath.  Patient states that she has been experiencing worsening shortness of breath and cough with productive sputum for around 5 days. She denies any recent travel or sick contacts. She uses oxygen at home only when needed, but for the past few days she has had to use it.  She is also complaining of nausea/vomiting decreased p.o. intake for the past 4 to 5 days.  She denies eating anything out of the ordinary that might have caused this and states that she is the only one in her family that her is having the symptoms.    In ED, blood pressure 162/83, heart rate 58, respiratory rate 16, temperature 98.4 and O2 saturation 96% on room air.  Labs on arrival showed a sodium 131, potassium 4.7, BUN 12, creatinine 0.7, proBNP 2100, WBC 10.1, hemoglobin 13.9 and platelet count 311    ---------------------------------------------------------------------------------------------------------------------   Review of Systems   Constitutional:  Positive for appetite change and fatigue. Negative for fever.   HENT:  Negative for ear pain, postnasal drip and sneezing.    Eyes:  Negative for redness.   Respiratory:  Positive for cough and shortness of breath. Negative for choking and chest tightness.    Cardiovascular:  Negative for chest pain and leg swelling.   Gastrointestinal:  Positive for nausea and  vomiting.   Endocrine: Negative for polydipsia.   Genitourinary:  Negative for dyspareunia and hematuria.   Musculoskeletal:  Negative for gait problem.   Allergic/Immunologic: Negative for food allergies.   Neurological:  Negative for dizziness and seizures.   Hematological:  Negative for adenopathy.   Psychiatric/Behavioral:  Negative for confusion and self-injury.    ---------------------------------------------------------------------------------------------------------------------   Past Medical History:   Diagnosis Date    Arthritis     COPD (chronic obstructive pulmonary disease)     Elevated cholesterol     High cholesterol     History of transfusion     Hyperlipidemia     Hypertension      Past Surgical History:   Procedure Laterality Date    BREAST BIOPSY Right     yrs ago benign    COLONOSCOPY      COLONOSCOPY N/A 5/10/2022    Procedure: COLONOSCOPY;  Surgeon: Kenia Braun MD;  Location: [facility];  Service: Gastroenterology;  Laterality: N/A;    ENDOSCOPY      ENDOSCOPY N/A 5/10/2022    Procedure: ESOPHAGOGASTRODUODENOSCOPY WITH BIOPSY;  Surgeon: Kenia Braun MD;  Location: [facility];  Service: Gastroenterology;  Laterality: N/A;    HIP SURGERY Right      Family History   Problem Relation Age of Onset    No Known Problems Mother     No Known Problems Father     No Known Problems Sister     No Known Problems Brother     No Known Problems Son     No Known Problems Daughter     No Known Problems Maternal Grandmother     No Known Problems Maternal Grandfather     No Known Problems Paternal Grandmother     No Known Problems Paternal Grandfather     No Known Problems Cousin     Rheum arthritis Neg Hx     Osteoarthritis Neg Hx     Asthma Neg Hx     Diabetes Neg Hx     Heart failure Neg Hx     Hyperlipidemia Neg Hx     Hypertension Neg Hx     Migraines Neg Hx     Rashes / Skin problems Neg Hx     Seizures Neg Hx     Stroke Neg Hx     Thyroid disease Neg Hx     Breast cancer Neg Hx       Social History     Socioeconomic History    Marital status:    Tobacco Use    Smoking status: Every Day     Packs/day: 1.00     Years: 30.00     Pack years: 30.00     Types: Cigarettes    Smokeless tobacco: Never   Vaping Use    Vaping Use: Never used   Substance and Sexual Activity    Alcohol use: No    Drug use: No    Sexual activity: Defer     ---------------------------------------------------------------------------------------------------------------------   Allergies:  Patient has no known allergies.  ---------------------------------------------------------------------------------------------------------------------   Medications below are reported home medications pulling from within the system; at this time, these medications have not been reconciled unless otherwise specified and are in the verification process for further verifcation as current home medications.      Prior to Admission Medications       Prescriptions Last Dose Informant Patient Reported? Taking?    ALPRAZolam (XANAX) 1 MG tablet   Yes No    Take 1 mg by mouth 2 (Two) Times a Day As Needed for Anxiety.    aspirin 81 MG chewable tablet   Yes No    Chew 81 mg Daily.    benzonatate (TESSALON) 100 MG capsule   No No    Take 1 capsule by mouth 3 (Three) Times a Day As Needed for Cough.    doxycycline (MONODOX) 100 MG capsule   No No    Take 1 capsule by mouth 2 (Two) Times a Day.    gabapentin (NEURONTIN) 800 MG tablet   Yes No    Take 800 mg by mouth 3 (Three) Times a Day.    HYDROcodone-acetaminophen (NORCO) 7.5-325 MG per tablet   Yes No    Take 1 tablet by mouth Every 8 (Eight) Hours As Needed for Moderate Pain (4-6).    lansoprazole (PREVACID) 30 MG capsule   No No    Take 1 capsule by mouth 2 (Two) Times a Day.    lisinopril (PRINIVIL,ZESTRIL) 20 MG tablet   Yes No    Take 20 mg by mouth Daily.    magnesium citrate solution   No No    Take 296 mL by mouth Take As Directed. Follow bowel prep instructions given at office     megestrol (MEGACE) 20 MG tablet   No No    Take 1 tablet by mouth 2 (Two) Times a Day.    meloxicam (MOBIC) 7.5 MG tablet   Yes No    7.5 mg.    methylPREDNISolone (MEDROL) 4 MG dose pack   No No    Take as directed on package instructions.    nitrofurantoin, macrocrystal-monohydrate, (Macrobid) 100 MG capsule   No No    TAKE 1 CAPSULE BY MOUTH TWICE DAILY X 10 DAYS, THEN TAKE 1 CAPSULE NIGHTLY FOR SUPPRESSIVE THERAPY E'COLI UTIs    NON FORMULARY   Yes No    Daily. Cholesterol pill    NON FORMULARY   Yes No    Daily. bp med    predniSONE (DELTASONE) 50 MG tablet   No No    Take 1 tablet by mouth Daily.    solifenacin (VESICARE) 10 MG tablet   No No    Take 1 tablet by mouth Daily.          Objective     Vital Signs:  Temp:  [98.4 °F (36.9 °C)] 98.4 °F (36.9 °C)  Heart Rate:  [56-58] 58  Resp:  [16-18] 18  BP: (162)/(83) 162/83    No data found.  SpO2:  [96 %] 96 %  on   ;   Device (Oxygen Therapy): room air  Body mass index is 18.33 kg/m².    Wt Readings from Last 3 Encounters:   10/01/23 44 kg (97 lb)   10/06/22 44 kg (97 lb)   05/31/22 42.6 kg (94 lb)      ----------------------------------------------------------------------------------------------------------------------  PHYSICAL EXAMINATION:  GENERAL: The patient is well developed and nontoxic.  HEENT: Nonicteric sclerae, PERRLA, EOMI. Oropharynx clear. Moist mucous membranes. Conjunctivae appear well perfused.  CHEST: Chest wall is nontender.  HEART: Regular rate and rhythm without murmurs.  LUNGS: Decreased breath sounds bilaterally   ABDOMEN: Soft, positive bowel sounds, nontender, no organomegaly.  RECTAL: Deferred.  SKIN: No rash, no excessive bruising, petechiae, or purpura.  NEUROLOGIC: Cranial nerves II-XII intact without motor/sensory  deficit.    ---------------------------------------------------------------------------------------------------------------------  --------------------------------------------------------------------------------------------------------------------  LABS:    CBC and coagulation:  Results from last 7 days   Lab Units 10/01/23  2156   WBC 10*3/mm3 10.16   HEMOGLOBIN g/dL 13.8   HEMATOCRIT % 42.6   MCV fL 87.7   MCHC g/dL 32.4   PLATELETS 10*3/mm3 311     Acid/base balance:  Results from last 7 days   Lab Units 10/01/23  2119   PH, ARTERIAL pH units 7.423   PO2 ART mm Hg 74.1*   PCO2, ARTERIAL mm Hg 38.3   HCO3 ART mmol/L 25.0     Renal and electrolytes:  Results from last 7 days   Lab Units 10/01/23  2156   SODIUM mmol/L 131*   POTASSIUM mmol/L 4.7   CHLORIDE mmol/L 94*   CO2 mmol/L 24.9   BUN mg/dL 12   CREATININE mg/dL 0.78   CALCIUM mg/dL 9.5   GLUCOSE mg/dL 97     Estimated Creatinine Clearance: 46 mL/min (by C-G formula based on SCr of 0.78 mg/dL).    Liver and pancreatic function:  Results from last 7 days   Lab Units 10/01/23  2156   ALBUMIN g/dL 4.3   BILIRUBIN mg/dL 0.3   ALK PHOS U/L 63   AST (SGOT) U/L 26   ALT (SGPT) U/L 14     Endocrine function:  No results found for: HGBA1C  Point of care bedside glucose levels:      Lab Results   Component Value Date    TSH 1.060 03/24/2023    FREET4 1.11 12/14/2014     Cardiac:  Results from last 7 days   Lab Units 10/01/23  2156   HSTROP T ng/L 8   PROBNP pg/mL 2,170.0*       Cultures:  Lab Results   Component Value Date    COLORU Dark Yellow (A) 10/06/2022    CLARITYU Clear 10/06/2022    SPECGRAV 1.015 05/24/2022    PHUR <=5.0 10/06/2022    GLUCOSEU Negative 10/06/2022    KETONESU Trace (A) 10/06/2022    BLOODU Negative 10/06/2022    NITRITEU Negative 10/06/2022    LEUKOCYTESUR Negative 10/06/2022    BILIRUBINUR Negative 10/06/2022    UROBILINOGEN 1.0 E.U./dL 10/06/2022    RBCUA 0-2 05/31/2022    WBCUA 6-12 (A) 05/31/2022    BACTERIA 1+ (A) 05/31/2022      Microbiology Results (last 10 days)       Procedure Component Value - Date/Time    COVID PRE-OP / PRE-PROCEDURE SCREENING ORDER (NO ISOLATION) - Swab, Nasopharynx [[id number]]  (Normal) Collected: 10/01/23 2155    Lab Status: Final result Specimen: Swab from Nasopharynx Updated: 10/01/23 2217    Narrative:      The following orders were created for panel order COVID PRE-OP / PRE-PROCEDURE SCREENING ORDER (NO ISOLATION) - Swab, Nasopharynx.  Procedure                               Abnormality         Status                     ---------                               -----------         ------                     COVID-19,CEPHEID/DOROTA,CO...[[id number]]  Normal              Final result                 Please view results for these tests on the individual orders.    COVID-19,CEPHEID/DOROTA,COR/LEFI/PAD/DONG/MAD IN-HOUSE(OR EMERGENT/ADD-ON),NP SWAB IN TRANSPORT MEDIA 3-4 HR TAT, RT-PCR - Swab, Nasopharynx [[id number]]  (Normal) Collected: 10/01/23 2155    Lab Status: Final result Specimen: Swab from Nasopharynx Updated: 10/01/23 2217     COVID19 Not Detected    Narrative:      Fact sheet for providers: https://www.fda.gov/media/476836/download     Fact sheet for patients: https://www.fda.gov/media/355267/download  Fact sheet for providers: https://www.fda.gov/media/208906/download    Fact sheet for patients: https://www.fda.gov/media/479103/download    Test performed by PCR.            No results found for: PREGTESTUR, PREGSERUM, HCG, HCGQUANT  Pain Management Panel  More data exists         Latest Ref Rng & Units 10/6/2022 6/24/2017   Pain Management Panel   Amphetamine, Urine Qual Negative Negative  Negative    Barbiturates Screen, Urine Negative Negative  Negative    Benzodiazepine Screen, Urine Negative Positive  Positive    Buprenorphine, Screen, Urine Negative Positive  Negative    Cocaine Screen, Urine Negative Negative  Negative    Methadone Screen , Urine Negative Negative  Negative    Methamphetamine, Ur Negative  Positive  -       I have personally looked at the labs and they are summarized above.  ----------------------------------------------------------------------------------------------------------------------  Detailed radiology reports for the last 24 hours:    Imaging Results (Last 24 Hours)       Procedure Component Value Units Date/Time    XR Chest 1 View [[id number]] Resulted: 10/01/23 2117     Updated: 10/01/23 2150          Final impressions for the last 30 days of radiology reports:    No radiology results for the last 30 days.      Assessment & Plan     This is a 72-year-old female with a past medical history of COPD, hyperlipidemia, hypertension, arthritis and anxiety presenting with shortness of breath.      #Intractable nausea vomiting:  - Admit to obs  - Zofran as needed  - Supportive care    #Shortness of breath:  - DuoNebs every 4 hours  - Titrate oxygen to SaO2 of 88% 92%  - Follow-up sputum and blood cultures  - Solu-Medrol 40 q8h  - Will hold off on starting any atbx at this time    #Hypertension:  -Currently well controlled  -Hydralazine as needed  -Resume home meds    #Elevated BNP:  - Patient is euvolemic on exam  - We will go ahead and order an echo to rule out CHF        Matti Rodriguez MD  [facility]  10/01/23  23:05 EDT

## 2023-10-02 NOTE — PLAN OF CARE
Goal Outcome Evaluation:      Patient resting in bed at this time. Patient has had no complaints or concerns this shift. VSS. No acute distress noted at this time. Will continue with plan of care.

## 2023-10-02 NOTE — DISCHARGE SUMMARY
[facility] HOSPITALIST MEDICINE DISCHARGE SUMMARY    Patient Identification:  Name:  Dorothy Cespedes  Age:  72 y.o.  Sex:  female  :  1950  MRN:  [id number]  Visit Number:  [id number]    Date of Admission: 10/1/2023  Date of Discharge: 10/2/2023    PCP: Johann Juárez MD    DISCHARGE DIAGNOSIS   COPD exacerbation  Intractable nausea/vomiting  Central hypertension      CONSULTS  None      PROCEDURES PERFORMED   None      HOSPITAL COURSE  Ms. Cespedes is a 72 y.o. female who presented to [facility] ED on 10/1/2023 with a chief complaint of nausea/vomiting with shortness of breath.  Patient has a past medical history markable for COPD, hyperlipidemia, essential hypertension and anxiety.  Patient reports worsening shortness of breath with a productive cough for approximately 5 days prior to evaluation in the emergency department.  She does report using oxygen at home when needed but has had to use her oxygen several days prior to admission continuously.  Patient also reported 4 to 5-day history of nausea with vomiting.  For this reason, she did present to the emergency department for further treatment and evaluation.  Initial evaluation in the emergency department did consist of basic laboratory work as well as physical exam and vital signs.  Initial vital signs found patient's blood pressure 162/83, heart rate 58, respiratory rate 16 with temperature 98.4 °F and oxygen saturation 96% on room air.  Initial lab work did include CBC and CMP.  CMP demonstrated mildly decreased serum sodium of 131 but otherwise was within normal limits.  CBC was within normal limits.  Overall, patient was diagnosed with intractable nausea and vomiting per admitting physician and admitted for further treatment and evaluation.    All laboratory work on date of admission as well as on date of discharge were within normal limits.  On physical exam, patient did have mild expiratory wheezing consistent  with mild COPD exacerbation.  IV steroids were discontinued and patient transition to prednisone 40 mg p.o. daily with a short taper.  Patient denies any recurrent nausea or vomiting since admission.  As patient is currently on room air, it is felt patient has reached maximum medical benefit of current hospitalization and will be discharged home in stable condition today.  Patient will also be given a short course of azithromycin to treat possible underlying bronchitis.  The beforementioned plan was thoroughly discussed with the patient and she expressed understanding and willingness to proceed with the beforementioned plan.  Have also recommended patient follow-up with primary care provider within 2 weeks of discharge.    VITAL SIGNS:      10/01/23  2030 10/02/23  0050 10/02/23  0500   Weight: 44 kg (97 lb) 47.6 kg (105 lb) 47.6 kg (105 lb)     Body mass index is 19.84 kg/m².    PHYSICAL EXAM:  General -slightly thin appearing  female appearing stated age in no apparent distress  HEENT -head normocephalic and atraumatic, pupils equally round and reactive to light  Lungs -faint end expiratory wheezing with no crackles appreciated  Cardiovascular -regular rate and rhythm with no murmurs, rubs or clicks appreciated  GI -abdomen soft, nontender nondistended with bowel sounds positive in all 4 quadrants with no organomegaly appreciated  Neurological -cranial nerves II through XII intact with no focal deficit or unintentional motor movement appreciated    DISCHARGE DISPOSITION   Stable    DISCHARGE MEDICATIONS:     Discharge Medications        New Medications        Instructions Start Date   azithromycin 250 MG tablet  Commonly known as: Zithromax   250 mg, Oral, Daily, Take 2 tablets (500 mg) on  Day 1,  followed by 1 tablet (250 mg) once daily on Days 2 through 5.      predniSONE 10 MG tablet  Commonly known as: DELTASONE   Take 4 tablets by mouth Daily for 2 days, THEN 2 tablets Daily for 2 days, THEN 1  tablet Daily for 2 days.   Start Date: October 2, 2023            Continue These Medications        Instructions Start Date   albuterol sulfate  (90 Base) MCG/ACT inhaler  Commonly known as: PROVENTIL HFA;VENTOLIN HFA;PROAIR HFA   2 puffs, Inhalation, Every 6 Hours PRN      albuterol (2.5 MG/3ML) 0.083% nebulizer solution  Commonly known as: PROVENTIL   2.5 mg, Nebulization, Every 6 Hours PRN      ALPRAZolam 1 MG tablet  Commonly known as: XANAX   1 mg, Oral, 2 Times Daily      Anoro Ellipta 62.5-25 MCG/ACT aerosol powder  inhaler  Generic drug: Umeclidinium-Vilanterol   1 puff, Inhalation, Daily - RT      aspirin 81 MG EC tablet   81 mg, Oral, Daily      atorvastatin 20 MG tablet  Commonly known as: LIPITOR   20 mg, Oral, Daily      gabapentin 800 MG tablet  Commonly known as: NEURONTIN   800 mg, Oral, 3 Times Daily      HYDROcodone-acetaminophen  MG per tablet  Commonly known as: NORCO   1 tablet, Oral, Every 6 Hours PRN      lisinopril 10 MG tablet  Commonly known as: PRINIVIL,ZESTRIL   10 mg, Oral, Daily      meloxicam 7.5 MG tablet  Commonly known as: MOBIC   7.5 mg, Oral, Daily      omeprazole 20 MG capsule  Commonly known as: priLOSEC   20 mg, Oral, 2 Times Daily PRN      oxybutynin 5 MG tablet  Commonly known as: DITROPAN   5 mg, Oral, 2 Times Daily                      Follow-up Information       Johann Juárez MD .    Specialty: Internal Medicine  Contact information:  [address] [place] [id number] [phone]                             TEST  RESULTS PENDING AT DISCHARGE  Pending Labs       Order Current Status    Blood Culture - Blood, Arm, Left In process    Blood Culture - Blood, Arm, Right In process    Respiratory Culture - Sputum, Cough Preliminary result             The ASCVD Risk score (Richard BELL, et al., 2019) failed to calculate for the following reasons:    The valid total cholesterol range is 130 to 320 mg/dL     Guevara Sweet DO  10/02/23  18:02  EDT    Please note that this discharge summary required more than 30 minutes to complete.    Please send a copy of this dictation to the following providers:  Johann Juárez MD

## 2023-10-04 LAB
BACTERIA SPEC RESP CULT: NORMAL
GRAM STN SPEC: NORMAL

## 2023-10-06 LAB
BACTERIA SPEC AEROBE CULT: NORMAL
BACTERIA SPEC AEROBE CULT: NORMAL

## 2023-10-25 ENCOUNTER — APPOINTMENT (OUTPATIENT)
Dept: GENERAL RADIOLOGY | Facility: HOSPITAL | Age: 73
DRG: 871 | End: 2023-10-25
Payer: MEDICARE

## 2023-10-25 LAB
A-A DO2: 112.1 MMHG (ref 0–300)
ARTERIAL PATENCY WRIST A: POSITIVE
ATMOSPHERIC PRESS: 733 MMHG
BASE EXCESS BLDA CALC-SCNC: 3.7 MMOL/L (ref 0–2)
BDY SITE: ABNORMAL
CO2 BLDA-SCNC: 30.6 MMOL/L (ref 22–33)
COHGB MFR BLD: 2.5 % (ref 0–5)
GAS FLOW AIRWAY: 3 LPM
HCO3 BLDA-SCNC: 29.2 MMOL/L (ref 20–26)
HCT VFR BLD CALC: 35.6 % (ref 38–51)
HGB BLDA-MCNC: 11.6 G/DL (ref 13.5–17.5)
INHALED O2 CONCENTRATION: 32 %
Lab: ABNORMAL
METHGB BLD QL: <-0.1 % (ref 0–3)
MODALITY: ABNORMAL
OXYHGB MFR BLDV: 87.1 % (ref 94–99)
PCO2 BLDA: 47 MM HG (ref 35–45)
PCO2 TEMP ADJ BLD: ABNORMAL MM[HG]
PH BLDA: 7.4 PH UNITS (ref 7.35–7.45)
PH, TEMP CORRECTED: ABNORMAL
PO2 BLDA: 55.2 MM HG (ref 83–108)
PO2 TEMP ADJ BLD: ABNORMAL MM[HG]
SAO2 % BLDCOA: 89 % (ref 94–99)
VENTILATOR MODE: ABNORMAL

## 2023-10-25 PROCEDURE — 83050 HGB METHEMOGLOBIN QUAN: CPT

## 2023-10-25 PROCEDURE — 82805 BLOOD GASES W/O2 SATURATION: CPT

## 2023-10-25 PROCEDURE — 36415 COLL VENOUS BLD VENIPUNCTURE: CPT

## 2023-10-25 PROCEDURE — 93005 ELECTROCARDIOGRAM TRACING: CPT | Performed by: STUDENT IN AN ORGANIZED HEALTH CARE EDUCATION/TRAINING PROGRAM

## 2023-10-25 PROCEDURE — 93010 ELECTROCARDIOGRAM REPORT: CPT | Performed by: SPECIALIST

## 2023-10-25 PROCEDURE — 71045 X-RAY EXAM CHEST 1 VIEW: CPT | Performed by: RADIOLOGY

## 2023-10-25 PROCEDURE — 71045 X-RAY EXAM CHEST 1 VIEW: CPT

## 2023-10-25 PROCEDURE — 99285 EMERGENCY DEPT VISIT HI MDM: CPT

## 2023-10-25 PROCEDURE — 36600 WITHDRAWAL OF ARTERIAL BLOOD: CPT

## 2023-10-25 PROCEDURE — 82375 ASSAY CARBOXYHB QUANT: CPT

## 2023-10-25 RX ORDER — SODIUM CHLORIDE 0.9 % (FLUSH) 0.9 %
10 SYRINGE (ML) INJECTION AS NEEDED
Status: DISCONTINUED | OUTPATIENT
Start: 2023-10-25 | End: 2023-10-29 | Stop reason: HOSPADM

## 2023-10-26 ENCOUNTER — HOSPITAL ENCOUNTER (INPATIENT)
Facility: HOSPITAL | Age: 73
LOS: 3 days | Discharge: HOME | DRG: 871 | End: 2023-10-29
Attending: STUDENT IN AN ORGANIZED HEALTH CARE EDUCATION/TRAINING PROGRAM | Admitting: HOSPITALIST
Payer: MEDICARE

## 2023-10-26 ENCOUNTER — APPOINTMENT (OUTPATIENT)
Dept: CT IMAGING | Facility: HOSPITAL | Age: 73
DRG: 871 | End: 2023-10-26
Payer: MEDICARE

## 2023-10-26 DIAGNOSIS — A41.9 SEPSIS, DUE TO UNSPECIFIED ORGANISM, UNSPECIFIED WHETHER ACUTE ORGAN DYSFUNCTION PRESENT: ICD-10-CM

## 2023-10-26 DIAGNOSIS — J44.1 COPD EXACERBATION: ICD-10-CM

## 2023-10-26 DIAGNOSIS — J96.21 ACUTE ON CHRONIC RESPIRATORY FAILURE WITH HYPOXIA: Primary | ICD-10-CM

## 2023-10-26 PROBLEM — J96.02 ACUTE RESPIRATORY FAILURE WITH HYPOXIA AND HYPERCAPNIA: Status: ACTIVE | Noted: 2023-10-26

## 2023-10-26 PROBLEM — J96.01 ACUTE RESPIRATORY FAILURE WITH HYPOXIA AND HYPERCAPNIA: Status: ACTIVE | Noted: 2023-10-26

## 2023-10-26 PROBLEM — J96.01 ACUTE RESPIRATORY FAILURE WITH HYPOXEMIA: Status: ACTIVE | Noted: 2023-10-26

## 2023-10-26 LAB
A-A DO2: 100.1 MMHG (ref 0–300)
A-A DO2: 131.5 MMHG (ref 0–300)
ALBUMIN SERPL-MCNC: 3.4 G/DL (ref 3.5–5.2)
ALBUMIN/GLOB SERPL: 1.1 G/DL
ALP SERPL-CCNC: 79 U/L (ref 39–117)
ALT SERPL W P-5'-P-CCNC: 13 U/L (ref 1–33)
ANION GAP SERPL CALCULATED.3IONS-SCNC: 8.7 MMOL/L (ref 5–15)
ANION GAP SERPL CALCULATED.3IONS-SCNC: 9.5 MMOL/L (ref 5–15)
ARTERIAL PATENCY WRIST A: ABNORMAL
ARTERIAL PATENCY WRIST A: POSITIVE
AST SERPL-CCNC: 22 U/L (ref 1–32)
ATMOSPHERIC PRESS: 733 MMHG
ATMOSPHERIC PRESS: 733 MMHG
B PARAPERT DNA SPEC QL NAA+PROBE: NOT DETECTED
B PERT DNA SPEC QL NAA+PROBE: NOT DETECTED
BASE EXCESS BLDA CALC-SCNC: -0.7 MMOL/L (ref 0–2)
BASE EXCESS BLDA CALC-SCNC: -3.4 MMOL/L (ref 0–2)
BASOPHILS # BLD MANUAL: 0.07 10*3/MM3 (ref 0–0.2)
BASOPHILS NFR BLD MANUAL: 1 % (ref 0–1.5)
BDY SITE: ABNORMAL
BDY SITE: ABNORMAL
BILIRUB SERPL-MCNC: 0.2 MG/DL (ref 0–1.2)
BUN SERPL-MCNC: 23 MG/DL (ref 8–23)
BUN SERPL-MCNC: 34 MG/DL (ref 8–23)
BUN/CREAT SERPL: 24.7 (ref 7–25)
BUN/CREAT SERPL: 26.6 (ref 7–25)
C PNEUM DNA NPH QL NAA+NON-PROBE: NOT DETECTED
CALCIUM SPEC-SCNC: 8.6 MG/DL (ref 8.6–10.5)
CALCIUM SPEC-SCNC: 9 MG/DL (ref 8.6–10.5)
CHLORIDE SERPL-SCNC: 103 MMOL/L (ref 98–107)
CHLORIDE SERPL-SCNC: 94 MMOL/L (ref 98–107)
CO2 BLDA-SCNC: 26 MMOL/L (ref 22–33)
CO2 BLDA-SCNC: 27.4 MMOL/L (ref 22–33)
CO2 SERPL-SCNC: 26.3 MMOL/L (ref 22–29)
CO2 SERPL-SCNC: 28.5 MMOL/L (ref 22–29)
COHGB MFR BLD: 1.9 % (ref 0–5)
COHGB MFR BLD: 2 % (ref 0–5)
CREAT SERPL-MCNC: 0.93 MG/DL (ref 0.57–1)
CREAT SERPL-MCNC: 1.28 MG/DL (ref 0.57–1)
CRP SERPL-MCNC: 42.39 MG/DL (ref 0–0.5)
D-LACTATE SERPL-SCNC: 1.2 MMOL/L (ref 0.5–2)
DEPRECATED RDW RBC AUTO: 51.4 FL (ref 37–54)
EGFRCR SERPLBLD CKD-EPI 2021: 44.9 ML/MIN/1.73
EGFRCR SERPLBLD CKD-EPI 2021: 65.8 ML/MIN/1.73
EPAP: 8
ERYTHROCYTE [DISTWIDTH] IN BLOOD BY AUTOMATED COUNT: 15.2 % (ref 12.3–15.4)
FLUAV RNA RESP QL NAA+PROBE: NOT DETECTED
FLUAV SUBTYP SPEC NAA+PROBE: NOT DETECTED
FLUBV RNA ISLT QL NAA+PROBE: NOT DETECTED
FLUBV RNA RESP QL NAA+PROBE: NOT DETECTED
GAS FLOW AIRWAY: 4 LPM
GEN 5 2HR TROPONIN T REFLEX: 21 NG/L
GLOBULIN UR ELPH-MCNC: 3.1 GM/DL
GLUCOSE SERPL-MCNC: 129 MG/DL (ref 65–99)
GLUCOSE SERPL-MCNC: 190 MG/DL (ref 65–99)
HADV DNA SPEC NAA+PROBE: NOT DETECTED
HCO3 BLDA-SCNC: 24.3 MMOL/L (ref 20–26)
HCO3 BLDA-SCNC: 25.9 MMOL/L (ref 20–26)
HCOV 229E RNA SPEC QL NAA+PROBE: NOT DETECTED
HCOV HKU1 RNA SPEC QL NAA+PROBE: NOT DETECTED
HCOV NL63 RNA SPEC QL NAA+PROBE: NOT DETECTED
HCOV OC43 RNA SPEC QL NAA+PROBE: NOT DETECTED
HCT VFR BLD AUTO: 38.1 % (ref 34–46.6)
HCT VFR BLD CALC: 34.1 % (ref 38–51)
HCT VFR BLD CALC: 35.1 % (ref 38–51)
HGB BLD-MCNC: 12 G/DL (ref 12–15.9)
HGB BLDA-MCNC: 11.1 G/DL (ref 13.5–17.5)
HGB BLDA-MCNC: 11.4 G/DL (ref 13.5–17.5)
HMPV RNA NPH QL NAA+NON-PROBE: NOT DETECTED
HOLD SPECIMEN: NORMAL
HOLD SPECIMEN: NORMAL
HPIV1 RNA ISLT QL NAA+PROBE: NOT DETECTED
HPIV2 RNA SPEC QL NAA+PROBE: NOT DETECTED
HPIV3 RNA NPH QL NAA+PROBE: NOT DETECTED
HPIV4 P GENE NPH QL NAA+PROBE: NOT DETECTED
INHALED O2 CONCENTRATION: 35 %
INHALED O2 CONCENTRATION: 36 %
IPAP: 20
L PNEUMO1 AG UR QL IA: NEGATIVE
LYMPHOCYTES # BLD MANUAL: 1.67 10*3/MM3 (ref 0.7–3.1)
LYMPHOCYTES NFR BLD MANUAL: 14 % (ref 5–12)
Lab: ABNORMAL
M PNEUMO IGG SER IA-ACNC: NOT DETECTED
MCH RBC QN AUTO: 28.6 PG (ref 26.6–33)
MCHC RBC AUTO-ENTMCNC: 31.5 G/DL (ref 31.5–35.7)
MCV RBC AUTO: 90.7 FL (ref 79–97)
METAMYELOCYTES NFR BLD MANUAL: 4 % (ref 0–0)
METHGB BLD QL: 0.2 % (ref 0–3)
METHGB BLD QL: 0.2 % (ref 0–3)
MODALITY: ABNORMAL
MODALITY: ABNORMAL
MONOCYTES # BLD: 0.98 10*3/MM3 (ref 0.1–0.9)
MRSA DNA SPEC QL NAA+PROBE: NORMAL
NEUTROPHILS # BLD AUTO: 3.97 10*3/MM3 (ref 1.7–7)
NEUTROPHILS NFR BLD MANUAL: 51 % (ref 42.7–76)
NEUTS BAND NFR BLD MANUAL: 6 % (ref 0–5)
NOTIFIED BY: ABNORMAL
NOTIFIED WHO: ABNORMAL
NT-PROBNP SERPL-MCNC: 2082 PG/ML (ref 0–900)
OXYHGB MFR BLDV: 81.6 % (ref 94–99)
OXYHGB MFR BLDV: 94.4 % (ref 94–99)
PCO2 BLDA: 50.1 MM HG (ref 35–45)
PCO2 BLDA: 55.3 MM HG (ref 35–45)
PCO2 TEMP ADJ BLD: ABNORMAL MM[HG]
PCO2 TEMP ADJ BLD: ABNORMAL MM[HG]
PH BLDA: 7.25 PH UNITS (ref 7.35–7.45)
PH BLDA: 7.32 PH UNITS (ref 7.35–7.45)
PH, TEMP CORRECTED: ABNORMAL
PH, TEMP CORRECTED: ABNORMAL
PLAT MORPH BLD: NORMAL
PLATELET # BLD AUTO: 223 10*3/MM3 (ref 140–450)
PMV BLD AUTO: 10.9 FL (ref 6–12)
PO2 BLDA: 54.2 MM HG (ref 83–108)
PO2 BLDA: 84.7 MM HG (ref 83–108)
PO2 TEMP ADJ BLD: ABNORMAL MM[HG]
PO2 TEMP ADJ BLD: ABNORMAL MM[HG]
POTASSIUM SERPL-SCNC: 5 MMOL/L (ref 3.5–5.2)
POTASSIUM SERPL-SCNC: 5.1 MMOL/L (ref 3.5–5.2)
PROCALCITONIN SERPL-MCNC: 7.3 NG/ML (ref 0–0.25)
PROT SERPL-MCNC: 6.5 G/DL (ref 6–8.5)
QT INTERVAL: 314 MS
QT INTERVAL: 350 MS
QTC INTERVAL: 424 MS
QTC INTERVAL: 432 MS
RBC # BLD AUTO: 4.2 10*6/MM3 (ref 3.77–5.28)
RBC MORPH BLD: NORMAL
RHINOVIRUS RNA SPEC NAA+PROBE: NOT DETECTED
RSV RNA NPH QL NAA+NON-PROBE: NOT DETECTED
S PNEUM AG SPEC QL LA: NEGATIVE
SAO2 % BLDCOA: 83.3 % (ref 94–99)
SAO2 % BLDCOA: 96.5 % (ref 94–99)
SARS-COV-2 RNA NPH QL NAA+NON-PROBE: NOT DETECTED
SARS-COV-2 RNA RESP QL NAA+PROBE: NOT DETECTED
SCAN SLIDE: NORMAL
SET MECH RESP RATE: 22
SODIUM SERPL-SCNC: 132 MMOL/L (ref 136–145)
SODIUM SERPL-SCNC: 138 MMOL/L (ref 136–145)
TROPONIN T DELTA: -13 NG/L
TROPONIN T SERPL HS-MCNC: 34 NG/L
VARIANT LYMPHS NFR BLD MANUAL: 24 % (ref 19.6–45.3)
VENTILATOR MODE: ABNORMAL
VENTILATOR MODE: ABNORMAL
WBC NRBC COR # BLD: 6.97 10*3/MM3 (ref 3.4–10.8)
WHOLE BLOOD HOLD COAG: NORMAL
WHOLE BLOOD HOLD SPECIMEN: NORMAL

## 2023-10-26 PROCEDURE — 36600 WITHDRAWAL OF ARTERIAL BLOOD: CPT

## 2023-10-26 PROCEDURE — 94799 UNLISTED PULMONARY SVC/PX: CPT

## 2023-10-26 PROCEDURE — 83050 HGB METHEMOGLOBIN QUAN: CPT

## 2023-10-26 PROCEDURE — 25010000002 METHYLPREDNISOLONE PER 40 MG: Performed by: HOSPITALIST

## 2023-10-26 PROCEDURE — 84145 PROCALCITONIN (PCT): CPT | Performed by: STUDENT IN AN ORGANIZED HEALTH CARE EDUCATION/TRAINING PROGRAM

## 2023-10-26 PROCEDURE — 93005 ELECTROCARDIOGRAM TRACING: CPT | Performed by: HOSPITALIST

## 2023-10-26 PROCEDURE — 25810000003 SODIUM CHLORIDE 0.9 % SOLUTION 250 ML FLEX CONT: Performed by: HOSPITALIST

## 2023-10-26 PROCEDURE — 71275 CT ANGIOGRAPHY CHEST: CPT | Performed by: RADIOLOGY

## 2023-10-26 PROCEDURE — 94761 N-INVAS EAR/PLS OXIMETRY MLT: CPT

## 2023-10-26 PROCEDURE — 87040 BLOOD CULTURE FOR BACTERIA: CPT | Performed by: STUDENT IN AN ORGANIZED HEALTH CARE EDUCATION/TRAINING PROGRAM

## 2023-10-26 PROCEDURE — 87641 MR-STAPH DNA AMP PROBE: CPT | Performed by: STUDENT IN AN ORGANIZED HEALTH CARE EDUCATION/TRAINING PROGRAM

## 2023-10-26 PROCEDURE — 99223 1ST HOSP IP/OBS HIGH 75: CPT | Performed by: HOSPITALIST

## 2023-10-26 PROCEDURE — 97162 PT EVAL MOD COMPLEX 30 MIN: CPT

## 2023-10-26 PROCEDURE — 82805 BLOOD GASES W/O2 SATURATION: CPT

## 2023-10-26 PROCEDURE — 85025 COMPLETE CBC W/AUTO DIFF WBC: CPT | Performed by: STUDENT IN AN ORGANIZED HEALTH CARE EDUCATION/TRAINING PROGRAM

## 2023-10-26 PROCEDURE — 94640 AIRWAY INHALATION TREATMENT: CPT

## 2023-10-26 PROCEDURE — 84484 ASSAY OF TROPONIN QUANT: CPT | Performed by: NURSE PRACTITIONER

## 2023-10-26 PROCEDURE — 93010 ELECTROCARDIOGRAM REPORT: CPT | Performed by: SPECIALIST

## 2023-10-26 PROCEDURE — 80053 COMPREHEN METABOLIC PANEL: CPT | Performed by: STUDENT IN AN ORGANIZED HEALTH CARE EDUCATION/TRAINING PROGRAM

## 2023-10-26 PROCEDURE — 25810000003 SODIUM CHLORIDE 0.9 % SOLUTION: Performed by: STUDENT IN AN ORGANIZED HEALTH CARE EDUCATION/TRAINING PROGRAM

## 2023-10-26 PROCEDURE — 71275 CT ANGIOGRAPHY CHEST: CPT

## 2023-10-26 PROCEDURE — 25010000002 METHYLPREDNISOLONE PER 125 MG: Performed by: STUDENT IN AN ORGANIZED HEALTH CARE EDUCATION/TRAINING PROGRAM

## 2023-10-26 PROCEDURE — 86140 C-REACTIVE PROTEIN: CPT | Performed by: STUDENT IN AN ORGANIZED HEALTH CARE EDUCATION/TRAINING PROGRAM

## 2023-10-26 PROCEDURE — 83605 ASSAY OF LACTIC ACID: CPT | Performed by: STUDENT IN AN ORGANIZED HEALTH CARE EDUCATION/TRAINING PROGRAM

## 2023-10-26 PROCEDURE — 82375 ASSAY CARBOXYHB QUANT: CPT

## 2023-10-26 PROCEDURE — 94660 CPAP INITIATION&MGMT: CPT

## 2023-10-26 PROCEDURE — 25010000002 VANCOMYCIN 1 G RECONSTITUTED SOLUTION 1 EACH VIAL: Performed by: STUDENT IN AN ORGANIZED HEALTH CARE EDUCATION/TRAINING PROGRAM

## 2023-10-26 PROCEDURE — 25010000002 CEFEPIME PER 500 MG: Performed by: STUDENT IN AN ORGANIZED HEALTH CARE EDUCATION/TRAINING PROGRAM

## 2023-10-26 PROCEDURE — 83880 ASSAY OF NATRIURETIC PEPTIDE: CPT | Performed by: STUDENT IN AN ORGANIZED HEALTH CARE EDUCATION/TRAINING PROGRAM

## 2023-10-26 PROCEDURE — 25810000003 SODIUM CHLORIDE 0.9 % SOLUTION 250 ML FLEX CONT: Performed by: STUDENT IN AN ORGANIZED HEALTH CARE EDUCATION/TRAINING PROGRAM

## 2023-10-26 PROCEDURE — 25010000002 HEPARIN (PORCINE) PER 1000 UNITS: Performed by: HOSPITALIST

## 2023-10-26 PROCEDURE — 85007 BL SMEAR W/DIFF WBC COUNT: CPT | Performed by: STUDENT IN AN ORGANIZED HEALTH CARE EDUCATION/TRAINING PROGRAM

## 2023-10-26 PROCEDURE — 94664 DEMO&/EVAL PT USE INHALER: CPT

## 2023-10-26 PROCEDURE — 25510000001 IOPAMIDOL PER 1 ML: Performed by: STUDENT IN AN ORGANIZED HEALTH CARE EDUCATION/TRAINING PROGRAM

## 2023-10-26 PROCEDURE — 87636 SARSCOV2 & INF A&B AMP PRB: CPT | Performed by: STUDENT IN AN ORGANIZED HEALTH CARE EDUCATION/TRAINING PROGRAM

## 2023-10-26 PROCEDURE — 87449 NOS EACH ORGANISM AG IA: CPT | Performed by: HOSPITALIST

## 2023-10-26 PROCEDURE — 25010000002 AZITHROMYCIN PER 500 MG: Performed by: HOSPITALIST

## 2023-10-26 PROCEDURE — 25010000002 CEFEPIME PER 500 MG: Performed by: HOSPITALIST

## 2023-10-26 PROCEDURE — 0202U NFCT DS 22 TRGT SARS-COV-2: CPT | Performed by: HOSPITALIST

## 2023-10-26 RX ORDER — MELOXICAM 7.5 MG/1
7.5 TABLET ORAL DAILY
Status: CANCELLED | OUTPATIENT
Start: 2023-10-26

## 2023-10-26 RX ORDER — CASTOR OIL AND BALSAM, PERU 788; 87 MG/G; MG/G
1 OINTMENT TOPICAL EVERY 12 HOURS SCHEDULED
Status: DISCONTINUED | OUTPATIENT
Start: 2023-10-26 | End: 2023-10-29 | Stop reason: HOSPADM

## 2023-10-26 RX ORDER — GUAIFENESIN 600 MG/1
600 TABLET, EXTENDED RELEASE ORAL EVERY 12 HOURS SCHEDULED
Status: DISCONTINUED | OUTPATIENT
Start: 2023-10-26 | End: 2023-10-27

## 2023-10-26 RX ORDER — HYDROCODONE BITARTRATE AND ACETAMINOPHEN 10; 325 MG/1; MG/1
1 TABLET ORAL EVERY 6 HOURS PRN
Status: CANCELLED | OUTPATIENT
Start: 2023-10-26

## 2023-10-26 RX ORDER — HEPARIN SODIUM 5000 [USP'U]/ML
5000 INJECTION, SOLUTION INTRAVENOUS; SUBCUTANEOUS EVERY 12 HOURS SCHEDULED
Status: DISCONTINUED | OUTPATIENT
Start: 2023-10-26 | End: 2023-10-29 | Stop reason: HOSPADM

## 2023-10-26 RX ORDER — LISINOPRIL 10 MG/1
10 TABLET ORAL DAILY
Status: CANCELLED | OUTPATIENT
Start: 2023-10-26

## 2023-10-26 RX ORDER — IPRATROPIUM BROMIDE AND ALBUTEROL SULFATE 2.5; .5 MG/3ML; MG/3ML
3 SOLUTION RESPIRATORY (INHALATION)
Status: DISCONTINUED | OUTPATIENT
Start: 2023-10-26 | End: 2023-10-26

## 2023-10-26 RX ORDER — NITROGLYCERIN 0.4 MG/1
0.4 TABLET SUBLINGUAL
Status: DISCONTINUED | OUTPATIENT
Start: 2023-10-26 | End: 2023-10-29 | Stop reason: HOSPADM

## 2023-10-26 RX ORDER — IPRATROPIUM BROMIDE AND ALBUTEROL SULFATE 2.5; .5 MG/3ML; MG/3ML
3 SOLUTION RESPIRATORY (INHALATION)
Status: DISCONTINUED | OUTPATIENT
Start: 2023-10-26 | End: 2023-10-29 | Stop reason: HOSPADM

## 2023-10-26 RX ORDER — HYDROCODONE BITARTRATE AND ACETAMINOPHEN 10; 325 MG/1; MG/1
1 TABLET ORAL EVERY 6 HOURS PRN
Status: DISCONTINUED | OUTPATIENT
Start: 2023-10-26 | End: 2023-10-29 | Stop reason: HOSPADM

## 2023-10-26 RX ORDER — NICOTINE 21 MG/24HR
1 PATCH, TRANSDERMAL 24 HOURS TRANSDERMAL
Status: DISCONTINUED | OUTPATIENT
Start: 2023-10-26 | End: 2023-10-29 | Stop reason: HOSPADM

## 2023-10-26 RX ORDER — SODIUM CHLORIDE 9 MG/ML
40 INJECTION, SOLUTION INTRAVENOUS AS NEEDED
Status: DISCONTINUED | OUTPATIENT
Start: 2023-10-26 | End: 2023-10-29 | Stop reason: HOSPADM

## 2023-10-26 RX ORDER — SODIUM CHLORIDE 0.9 % (FLUSH) 0.9 %
10 SYRINGE (ML) INJECTION EVERY 12 HOURS SCHEDULED
Status: DISCONTINUED | OUTPATIENT
Start: 2023-10-26 | End: 2023-10-29 | Stop reason: HOSPADM

## 2023-10-26 RX ORDER — GABAPENTIN 300 MG/1
300 CAPSULE ORAL EVERY 8 HOURS SCHEDULED
Status: CANCELLED | OUTPATIENT
Start: 2023-10-26

## 2023-10-26 RX ORDER — BISACODYL 10 MG
10 SUPPOSITORY, RECTAL RECTAL DAILY PRN
Status: DISCONTINUED | OUTPATIENT
Start: 2023-10-26 | End: 2023-10-29 | Stop reason: HOSPADM

## 2023-10-26 RX ORDER — ALBUTEROL SULFATE 2.5 MG/3ML
2.5 SOLUTION RESPIRATORY (INHALATION) EVERY 6 HOURS PRN
Status: CANCELLED | OUTPATIENT
Start: 2023-10-26

## 2023-10-26 RX ORDER — POLYETHYLENE GLYCOL 3350 17 G/17G
17 POWDER, FOR SOLUTION ORAL DAILY PRN
Status: DISCONTINUED | OUTPATIENT
Start: 2023-10-26 | End: 2023-10-29 | Stop reason: HOSPADM

## 2023-10-26 RX ORDER — ARFORMOTEROL TARTRATE 15 UG/2ML
15 SOLUTION RESPIRATORY (INHALATION)
Status: CANCELLED | OUTPATIENT
Start: 2023-10-26

## 2023-10-26 RX ORDER — METHYLPREDNISOLONE SODIUM SUCCINATE 125 MG/2ML
125 INJECTION, POWDER, LYOPHILIZED, FOR SOLUTION INTRAMUSCULAR; INTRAVENOUS ONCE
Status: COMPLETED | OUTPATIENT
Start: 2023-10-26 | End: 2023-10-26

## 2023-10-26 RX ORDER — AMOXICILLIN 250 MG
2 CAPSULE ORAL 2 TIMES DAILY
Status: DISCONTINUED | OUTPATIENT
Start: 2023-10-26 | End: 2023-10-29 | Stop reason: HOSPADM

## 2023-10-26 RX ORDER — IPRATROPIUM BROMIDE AND ALBUTEROL SULFATE 2.5; .5 MG/3ML; MG/3ML
3 SOLUTION RESPIRATORY (INHALATION) ONCE
Status: COMPLETED | OUTPATIENT
Start: 2023-10-26 | End: 2023-10-26

## 2023-10-26 RX ORDER — SODIUM CHLORIDE 0.9 % (FLUSH) 0.9 %
10 SYRINGE (ML) INJECTION AS NEEDED
Status: DISCONTINUED | OUTPATIENT
Start: 2023-10-26 | End: 2023-10-29 | Stop reason: HOSPADM

## 2023-10-26 RX ORDER — FUROSEMIDE 10 MG/ML
80 INJECTION INTRAMUSCULAR; INTRAVENOUS ONCE
Status: DISCONTINUED | OUTPATIENT
Start: 2023-10-26 | End: 2023-10-26

## 2023-10-26 RX ORDER — METHYLPREDNISOLONE SODIUM SUCCINATE 40 MG/ML
40 INJECTION, POWDER, LYOPHILIZED, FOR SOLUTION INTRAMUSCULAR; INTRAVENOUS EVERY 12 HOURS
Status: DISCONTINUED | OUTPATIENT
Start: 2023-10-26 | End: 2023-10-29 | Stop reason: HOSPADM

## 2023-10-26 RX ORDER — BISACODYL 5 MG/1
5 TABLET, DELAYED RELEASE ORAL DAILY PRN
Status: DISCONTINUED | OUTPATIENT
Start: 2023-10-26 | End: 2023-10-29 | Stop reason: HOSPADM

## 2023-10-26 RX ADMIN — CASTOR OIL AND BALSAM, PERU 1 APPLICATION: 788; 87 OINTMENT TOPICAL at 11:11

## 2023-10-26 RX ADMIN — Medication 10 ML: at 08:54

## 2023-10-26 RX ADMIN — IPRATROPIUM BROMIDE AND ALBUTEROL SULFATE 3 ML: 2.5; .5 SOLUTION RESPIRATORY (INHALATION) at 06:24

## 2023-10-26 RX ADMIN — VANCOMYCIN HYDROCHLORIDE 1000 MG: 1 INJECTION, POWDER, LYOPHILIZED, FOR SOLUTION INTRAVENOUS at 02:34

## 2023-10-26 RX ADMIN — CASTOR OIL AND BALSAM, PERU 1 APPLICATION: 788; 87 OINTMENT TOPICAL at 20:24

## 2023-10-26 RX ADMIN — CEFEPIME 2000 MG: 2 INJECTION, POWDER, FOR SOLUTION INTRAVENOUS at 13:54

## 2023-10-26 RX ADMIN — CEFEPIME 2000 MG: 2 INJECTION, POWDER, FOR SOLUTION INTRAVENOUS at 01:51

## 2023-10-26 RX ADMIN — SODIUM CHLORIDE 1389 ML: 9 INJECTION, SOLUTION INTRAVENOUS at 01:51

## 2023-10-26 RX ADMIN — IOPAMIDOL 60 ML: 755 INJECTION, SOLUTION INTRAVENOUS at 03:33

## 2023-10-26 RX ADMIN — NICOTINE 1 PATCH: 14 PATCH, EXTENDED RELEASE TRANSDERMAL at 10:07

## 2023-10-26 RX ADMIN — METHYLPREDNISOLONE SODIUM SUCCINATE 125 MG: 125 INJECTION, POWDER, FOR SOLUTION INTRAMUSCULAR; INTRAVENOUS at 01:51

## 2023-10-26 RX ADMIN — IPRATROPIUM BROMIDE AND ALBUTEROL SULFATE 3 ML: .5; 2.5 SOLUTION RESPIRATORY (INHALATION) at 01:30

## 2023-10-26 RX ADMIN — HEPARIN SODIUM 5000 UNITS: 5000 INJECTION INTRAVENOUS; SUBCUTANEOUS at 08:53

## 2023-10-26 RX ADMIN — IPRATROPIUM BROMIDE AND ALBUTEROL SULFATE 3 ML: 2.5; .5 SOLUTION RESPIRATORY (INHALATION) at 03:41

## 2023-10-26 RX ADMIN — GUAIFENESIN 600 MG: 600 TABLET, EXTENDED RELEASE ORAL at 10:07

## 2023-10-26 RX ADMIN — METHYLPREDNISOLONE SODIUM SUCCINATE 40 MG: 40 INJECTION, POWDER, FOR SOLUTION INTRAMUSCULAR; INTRAVENOUS at 20:24

## 2023-10-26 RX ADMIN — Medication 10 ML: at 20:23

## 2023-10-26 RX ADMIN — NICOTINE 1 PATCH: 7 PATCH, EXTENDED RELEASE TRANSDERMAL at 04:19

## 2023-10-26 RX ADMIN — HYDROCODONE BITARTRATE AND ACETAMINOPHEN 1 TABLET: 10; 325 TABLET ORAL at 15:38

## 2023-10-26 RX ADMIN — HEPARIN SODIUM 5000 UNITS: 5000 INJECTION INTRAVENOUS; SUBCUTANEOUS at 20:24

## 2023-10-26 RX ADMIN — IPRATROPIUM BROMIDE AND ALBUTEROL SULFATE 3 ML: .5; 2.5 SOLUTION RESPIRATORY (INHALATION) at 18:56

## 2023-10-26 RX ADMIN — METHYLPREDNISOLONE SODIUM SUCCINATE 40 MG: 40 INJECTION, POWDER, FOR SOLUTION INTRAMUSCULAR; INTRAVENOUS at 08:53

## 2023-10-26 RX ADMIN — DOCUSATE SODIUM 50 MG AND SENNOSIDES 8.6 MG 2 TABLET: 8.6; 5 TABLET, FILM COATED ORAL at 20:24

## 2023-10-26 RX ADMIN — IPRATROPIUM BROMIDE AND ALBUTEROL SULFATE 3 ML: .5; 2.5 SOLUTION RESPIRATORY (INHALATION) at 12:34

## 2023-10-26 RX ADMIN — AZITHROMYCIN DIHYDRATE 500 MG: 500 INJECTION, POWDER, LYOPHILIZED, FOR SOLUTION INTRAVENOUS at 04:19

## 2023-10-26 RX ADMIN — GUAIFENESIN 600 MG: 600 TABLET, EXTENDED RELEASE ORAL at 20:24

## 2023-10-26 NOTE — H&P
Hospitalist History and Physical        Patient Identification  Name: Dorothy Cespedes  Age/Sex: 72 y.o. female  :  1950        MRN: [id number]  Visit Number: [id number]  Admit Date: 10/26/2023   PCP: Johann Juárez MD          Chief complaint short of breath    History of Present Illness:  Patient is a 72 y.o. female with history of COPD on 2L NC at home but only at night as needed, HTN, HLD, grade 1 diastolic CHF with preserved EF per ECHO earlier this month, and arthritis, who presents with complaints of worsening shortness of breath over the last week or so. She reports increased cough with yellow sputum production during this time. She reports increased wheezing and subjective fevers as well. She denies any lower extremity edema at this time. She denies any known sick contacts. She reports about 2 weeks ago, her PCP prescribed her a 7 day course of steroids for her worsening symptoms. In addition, she has been using her home supplemental oxygen and breathing treatments more. However, her symptoms continued to worsen, and today she became so short of breath and weak that she fell to the ground and nearly passed out, which prompted her to come to the ED tonight. In the ED, she was noted to have a pulse oximetry of 75% on RA. She was placed on 3L NC and O2 sat improved to as high as 89%. ABG showed CO2 47 with compensated pH 7.401, PO2 55 and O2 sat 89% on 3L NC so she was increased to 3.5L. She received IV solu-medrol, duoneb treatments, and IV vancomycin and cefepime and O2 sat improved to as high as 98% so she was weaned back to 3L NC. Over the course of the last couple hours, O2 sat has started dropping again, down to as low as 87% while I was in the room with her, so she has been turned back up to 3.5L NC. Labs show elevated troponin of 34, elevated BNP 2082, Cr 1.28 (up from 0.81 on 10/2/23), BUN 34 with BUN:cr ratio of 26.6, CRP 42, Procal 7.3, lactate 1.2 and WBC 6.97. CXR showed chronic  interstitial lung disease and possible mild interstitial edema. CT chest PE protocol is pending, but per my personal review, she appears to have a left lower lobe pneumonia. She is being admitted to the telemetry unit for further evaluation and management.     Review of Systems  Review of Systems   Constitutional:  Positive for activity change, chills, fatigue and fever. Negative for appetite change, diaphoresis and unexpected weight change.   HENT:  Negative for congestion, postnasal drip, rhinorrhea, sinus pressure, sinus pain and sore throat.    Eyes:  Negative for photophobia, pain, discharge, redness, itching and visual disturbance.   Respiratory:  Positive for cough, shortness of breath and wheezing.    Cardiovascular:  Positive for chest pain (associated with cough and deep inspiration). Negative for palpitations and leg swelling.   Gastrointestinal:  Negative for abdominal distention, abdominal pain, constipation, diarrhea, nausea and vomiting.   Endocrine: Negative for cold intolerance, heat intolerance, polydipsia, polyphagia and polyuria.   Genitourinary:  Negative for difficulty urinating, dysuria, flank pain, frequency and hematuria.   Musculoskeletal:  Positive for arthralgias (chronic) and back pain (chronic). Negative for joint swelling, myalgias, neck pain and neck stiffness.   Skin:  Negative for color change, pallor, rash and wound.   Neurological:  Positive for dizziness, weakness (generalized) and light-headedness. Negative for seizures, numbness and headaches.   Hematological:  Negative for adenopathy. Does not bruise/bleed easily.   Psychiatric/Behavioral:  Negative for agitation, behavioral problems and confusion.        History  Past Medical History:   Diagnosis Date    Arthritis     COPD (chronic obstructive pulmonary disease)     Elevated cholesterol     High cholesterol     History of transfusion     Hyperlipidemia     Hypertension      Past Surgical History:   Procedure Laterality Date     BREAST BIOPSY Right     yrs ago benign    COLONOSCOPY      COLONOSCOPY N/A 5/10/2022    Procedure: COLONOSCOPY;  Surgeon: Kenia Braun MD;  Location: [facility] OR;  Service: Gastroenterology;  Laterality: N/A;    ENDOSCOPY      ENDOSCOPY N/A 5/10/2022    Procedure: ESOPHAGOGASTRODUODENOSCOPY WITH BIOPSY;  Surgeon: Kenia Braun MD;  Location: [facility] OR;  Service: Gastroenterology;  Laterality: N/A;    HIP SURGERY Right      Family History   Problem Relation Age of Onset    No Known Problems Mother     No Known Problems Father     No Known Problems Sister     No Known Problems Brother     No Known Problems Son     No Known Problems Daughter     No Known Problems Maternal Grandmother     No Known Problems Maternal Grandfather     No Known Problems Paternal Grandmother     No Known Problems Paternal Grandfather     No Known Problems Cousin     Rheum arthritis Neg Hx     Osteoarthritis Neg Hx     Asthma Neg Hx     Diabetes Neg Hx     Heart failure Neg Hx     Hyperlipidemia Neg Hx     Hypertension Neg Hx     Migraines Neg Hx     Rashes / Skin problems Neg Hx     Seizures Neg Hx     Stroke Neg Hx     Thyroid disease Neg Hx     Breast cancer Neg Hx      Social History     Tobacco Use    Smoking status: Every Day     Packs/day: 1.00     Years: 30.00     Additional pack years: 0.00     Total pack years: 30.00     Types: Cigarettes    Smokeless tobacco: Never   Vaping Use    Vaping Use: Never used   Substance Use Topics    Alcohol use: No    Drug use: No     (Not in a hospital admission)    Allergies:  Patient has no known allergies.    Objective     Vital Signs  Temp:  [98.4 °F (36.9 °C)] 98.4 °F (36.9 °C)  Heart Rate:  [] 115  Resp:  [20-28] 20  BP: ()/(55-96) 128/80  Body mass index is 19.27 kg/m².    Physical Exam:  Physical Exam  Constitutional:       General: She is not in acute distress.     Comments: Elderly female, cachectic, both acutely and chronically ill in appearance    HENT:      Head: Normocephalic and atraumatic.      Right Ear: External ear normal.      Left Ear: External ear normal.      Nose: Nose normal.      Mouth/Throat:      Mouth: Mucous membranes are moist.      Pharynx: Oropharynx is clear.   Eyes:      Extraocular Movements: Extraocular movements intact.      Conjunctiva/sclera: Conjunctivae normal.      Pupils: Pupils are equal, round, and reactive to light.   Cardiovascular:      Rate and Rhythm: Regular rhythm. Tachycardia present.      Pulses: Normal pulses.      Heart sounds: Normal heart sounds. No murmur heard.  Pulmonary:      Comments: Tachypneic, diffuse expiratory wheezing on auscultation, with rhonchi noted in bilateral bases  Abdominal:      General: Abdomen is flat. Bowel sounds are normal. There is no distension.      Palpations: Abdomen is soft.      Tenderness: There is no abdominal tenderness.   Musculoskeletal:         General: Normal range of motion.      Cervical back: Normal range of motion and neck supple. No tenderness.      Right lower leg: No edema.      Left lower leg: No edema.   Lymphadenopathy:      Cervical: No cervical adenopathy.   Skin:     General: Skin is warm and dry.      Capillary Refill: Capillary refill takes less than 2 seconds.      Coloration: Skin is not jaundiced.      Findings: No bruising or lesion.   Neurological:      General: No focal deficit present.      Mental Status: She is alert and oriented to person, place, and time.   Psychiatric:         Mood and Affect: Mood normal.         Behavior: Behavior normal.           Results Review:       Lab Results:  Results from last 7 days   Lab Units 10/26/23  0145   WBC 10*3/mm3 6.97   HEMOGLOBIN g/dL 12.0   PLATELETS 10*3/mm3 223     Results from last 7 days   Lab Units 10/26/23  0145   CRP mg/dL 42.39*     Results from last 7 days   Lab Units 10/26/23  0145   SODIUM mmol/L 132*   POTASSIUM mmol/L 5.1   CHLORIDE mmol/L 94*   CO2 mmol/L 28.5   BUN mg/dL 34*   CREATININE  "mg/dL 1.28*   CALCIUM mg/dL 9.0   GLUCOSE mg/dL 129*         No results found for: \"HGBA1C\"  Results from last 7 days   Lab Units 10/26/23  0145   BILIRUBIN mg/dL 0.2   ALK PHOS U/L 79   AST (SGOT) U/L 22   ALT (SGPT) U/L 13     Results from last 7 days   Lab Units 10/26/23  0145   HSTROP T ng/L 34*             Results from last 7 days   Lab Units 10/25/23  2307   PH, ARTERIAL pH units 7.401   PO2 ART mm Hg 55.2*   PCO2, ARTERIAL mm Hg 47.0*   HCO3 ART mmol/L 29.2*       I have reviewed the patient's laboratory results.    Imaging:  Imaging Results (Last 72 Hours)       Procedure Component Value Units Date/Time    CT Angiogram Chest Pulmonary Embolism [[id number]] Resulted: 10/26/23 0234     Updated: 10/26/23 0333    XR Chest 1 View [[id number]] Collected: 10/26/23 0006     Updated: 10/26/23 0010    Narrative:      PROCEDURE: Portable chest x-ray examination performed on October 25, 2023. Single view. Upright position.     HISTORY: Hypertension.     FINDINGS:     Heart size within normal limits.  Coarsened bronchovascular pattern to the lungs with features of  underlying chronic interstitial lung disease.  Possible contribution from mild interstitial edema.  No pleural effusion or pneumothorax.  Mild hypoinflated lungs.  No free air in the upper abdomen.       Impression:         1.  Coarsened bronchovascular pattern to the lungs with features of  underlying chronic interstitial lung disease.  2.  Possible mild interstitial edema.  3.  Mild hypoinflated lungs.  4.  No pleural effusion or pneumothorax.  5.  No fracture or foreign body.     This report was finalized on 10/26/2023 12:08 AM by Dimas Judd MD.               I have personally reviewed the patient's radiologic imaging.        EKG:   Sinus tachycardia, , QTc 424  Otherwise normal ECG  When compared with ECG of 01-OCT-2023 20:52,  Vent. rate has increased BY  51 BPM  Criteria for Septal infarct are no longer present  T wave inversion no longer " evident in Anterior leads    I have personally reviewed the patient's EKG. No overt ST changes appreciated.         Assessment & Plan     - Acute respiratory failure with hypoxemia: secondary to COPD exacerbation and likely underlying left lower lobe pneumonia, awaiting CT PE protocol results to confirm and to rule out PE. Treat COPD exacerbation with IV solu-medrol and scheduled nebs. Treat possible pneumonia with IV cefepime and azithromycin. MRSA nares negative so hold off on IV vancomycin. Check respiratory PCR panel, urine legionella antigen to rule out atypical organisms. Check urine strep pneumoniae antigen as well. Continue to trend CRP. Follow up blood cultures collected in the ED. O2 sat had improved after initial treatment and NC was turned from 3.5 to 3L, but now hypoxic again, so increasing back to 3.5L and will repeat ABG in about 30 minutes.   - Sepsis, present on admission with tachycardia, tachypnea, CRP and procal elevation, secondary to left lower lobe pneumonia: see plan above.  - Acute renal insufficiency vs ALLISON: Cr 1.28, up from 0.81 as of 10/2/23 which appears to be her baseline. Received IV fluid bolus in the ED. Avoid nephrotoxic agents and repeat labs later this morning.   - Elevated BNP, troponin: suspect secondary to hypoxia and sepsis/pneumonia above, along with elevated creatinine. Repeat troponin pending, but denies chest pressure and EKG not felt to show any evidence of acute ischemia. CXR comments on possible mild interstitial edema, but no rales or lower extremity edema appreciated on exam. Hold diuresis while awaiting CT chest PE protocol results, as clinically patient appears dehydrated rather than fluid overloaded at this time.   - Hypertension: BP currently stable at 138/82. Review home meds once reconciled by pharmacy.     DVT Prophylaxis: SQ heparin    Estimated Length of Stay >2 midnights    I discussed the patient's findings, assessment and plan with the patient and ED  provider Dr Teresa.    Patient is high risk due to acute hypoxic respiratory failure, COPD exacerbation, sepsis secondary to pneumonia, ALLISON vs acute renal insufficiency, elevated BNP and troponin     Hector Anaya MD  10/26/23  03:44 EDT  --------------------------------------------------------------------------------------------------------------------  Update: repeat ABG now shows worsening hypoxia as well as worsening hypercarbia and development of respiratory acidosis, so will start bipap and admit to PCU instead of telemetry.

## 2023-10-26 NOTE — CASE MANAGEMENT/SOCIAL WORK
Discharge Planning Assessment   Ke     Patient Name: Dorothy Cespedes  MRN: [id number]  Today's Date: 10/26/2023    Admit Date: 10/26/2023     Discharge Plan       Row Name 10/26/23 1709       Plan    Plan CM completed initial consult. SS received CM consult for Discharge planning, Son request Home Health, Basic completed. SS aware of consult. SS to follow up with pt and son on 10/27/23.        VISHAL Trejo

## 2023-10-26 NOTE — THERAPY EVALUATION
Acute Care - Physical Therapy Initial Evaluation   Ke     Patient Name: Dorothy Cespedes  : 1950  MRN: [id number]  Today's Date: 10/26/2023   Onset of Illness/Injury or Date of Surgery: 10/26/23  Visit Dx:     ICD-10-CM ICD-9-CM   1. Acute on chronic respiratory failure with hypoxia  J96.21 518.84     799.02   2. Sepsis, due to unspecified organism, unspecified whether acute organ dysfunction present  A41.9 038.9     995.91   3. COPD exacerbation  J44.1 491.21     Patient Active Problem List   Diagnosis    Weight loss    Loss of appetite    Acute respiratory failure with hypoxemia    Acute respiratory failure with hypoxia and hypercapnia     Past Medical History:   Diagnosis Date    Arthritis     COPD (chronic obstructive pulmonary disease)     Elevated cholesterol     High cholesterol     History of transfusion     Hyperlipidemia     Hypertension      Past Surgical History:   Procedure Laterality Date    BREAST BIOPSY Right     yrs ago benign    COLONOSCOPY      COLONOSCOPY N/A 5/10/2022    Procedure: COLONOSCOPY;  Surgeon: Kenia Braun MD;  Location: [facility];  Service: Gastroenterology;  Laterality: N/A;    ENDOSCOPY      ENDOSCOPY N/A 5/10/2022    Procedure: ESOPHAGOGASTRODUODENOSCOPY WITH BIOPSY;  Surgeon: Kenia Braun MD;  Location: [facility];  Service: Gastroenterology;  Laterality: N/A;    HIP SURGERY Right      PT Assessment (last 12 hours)       PT Evaluation and Treatment       Row Name 10/26/23 1406          Physical Therapy Time and Intention    Subjective Information no complaints  -AG     Document Type evaluation  -AG     Mode of Treatment physical therapy  -AG     Patient Effort good  -AG     Symptoms Noted During/After Treatment fatigue;significant change in vital signs  -AG       Row Name 10/26/23 1408          General Information    Patient Profile Reviewed yes  -AG     Onset of Illness/Injury or Date of Surgery 10/26/23  -AG     Referring Physician   Sw[place]y  -AG     Patient Observations agree to therapy;cooperative;lethargic  -AG     Patient/Family/Caregiver Comments/Observations pt. oviedo's position in PCU bed, on 3L O2 nc.  Pt. drowsy but arouses when asked to sit EOB.  -AG     Prior Level of Function independent:  wearing O2 nc at night; ambulating with straight cane  -AG     Equipment Currently Used at Home cane, straight;oxygen  -AG     Pertinent History of Current Functional Problem pt. admitted with acute respiratory failure with hypoxia and hypercapnia  -AG     Existing Precautions/Restrictions fall;oxygen therapy device and L/min  -AG     Equipment Issued to Patient gait belt  -AG     Risks Reviewed patient:;LOB;dizziness  -AG     Benefits Reviewed patient:;improve function;increase independence;increase strength;increase balance;decrease risk of DVT;increase knowledge  -AG     Barriers to Rehab previous functional deficit  -AG       Row Name 10/26/23 1406          Previous Level of Function/Home Environm    BADLs, Premorbid Functional Level independent  -AG     Bed Mobility, Premorbid Functional Level independent  -AG     Transfers, Premorbid Functional Level independent;uses device or equipment  -AG     Household Ambulation, Premorbid Functional Level independent;uses device or equipment  -AG       Row Name 10/26/23 1406          Living Environment    Current Living Arrangements home  -AG     Home Accessibility wheelchair accessible  -AG     People in Home --  patient lives with granddaughter  -AG     Primary Care Provided by self  -AG       Row Name 10/26/23 1406          Home Use of Assistive/Adaptive Equipment    Equipment Currently Used at Home cane, straight;oxygen  -AG       Row Name 10/26/23 1406          Pain    Pretreatment Pain Rating 0/10 - no pain  -AG     Posttreatment Pain Rating 0/10 - no pain  -AG       Row Name 10/26/23 1406          Cognition    Affect/Mental Status (Cognition) low arousal/lethargic  -AG     Orientation Status  (Cognition) oriented x 3  -AG     Follows Commands (Cognition) follows two-step commands;verbal cues/prompting required;repetition of directions required  -AG     Cognitive Function WFL  -       Row Name 10/26/23 1406          Range of Motion (ROM)    Range of Motion ROM is WFL;bilateral upper extremities;bilateral lower extremities  -       Row Name 10/26/23 1406          Strength (Manual Muscle Testing)    Strength (Manual Muscle Testing) --  B hips, knees 4/5; R foot drop noted  -       Row Name 10/26/23 1406          Sensory    Hearing Status WFL  -       Row Name 10/26/23 1406          Vision Assessment/Intervention    Visual Impairment/Limitations WFL;corrective lenses full-time  -       Row Name 10/26/23 1406          Mobility    Extremity Weight-bearing Status --  no restrictions  -       Row Name 10/26/23 1406          Bed Mobility    Bed Mobility rolling right;rolling left;scooting/bridging;supine-sit;sit-supine  -AG     Scooting/Bridging [place] (Bed Mobility) standby assist  -AG     Supine-Sit [place] (Bed Mobility) contact guard  -AG     Sit-Supine [place] (Bed Mobility) contact guard  -AG     Assistive Device (Bed Mobility) bed rails  -       Row Name 10/26/23 1406          Transfers    Transfers sit-stand transfer;stand-sit transfer  -       Row Name 10/26/23 1406          Sit-Stand Transfer    Sit-Stand [place] (Transfers) contact guard;verbal cues;nonverbal cues (demo/gesture)  -     Assistive Device (Sit-Stand Transfers) walker, front-wheeled  -AG       Row Name 10/26/23 1406          Stand-Sit Transfer    Stand-Sit [place] (Transfers) verbal cues;nonverbal cues (demo/gesture);contact guard  -     Assistive Device (Stand-Sit Transfers) walker, front-wheeled  -AG       Row Name 10/26/23 1406          Gait/Stairs (Locomotion)    Gait/Stairs Locomotion gait/ambulation independence;gait/ambulation assistive device;distance ambulated;gait pattern;gait  deviations  -AG     [place] Level (Gait) contact guard;nonverbal cues (demo/gesture);verbal cues  -AG     Assistive Device (Gait) walker, front-wheeled  -AG     Distance in Feet (Gait) pt. took 2 forward, backward steps w/ RW, CGA; also took a few L lateral steps toward HOB.  -AG     Pattern (Gait) step-to  -AG     Right Sided Gait Deviations foot drop/toe drag  -AG       Row Name 10/26/23 1406          Safety Issues, Functional Mobility    Impairments Affecting Function (Mobility) balance;endurance/activity tolerance;strength  -AG       Row Name 10/26/23 1406          Balance    Balance Assessment sitting static balance;sitting dynamic balance;sit to stand dynamic balance;standing static balance;standing dynamic balance  -AG     Static Sitting Balance standby assist  -AG     Position, Sitting Balance unsupported;sitting edge of bed  -AG     Static Standing Balance standby assist;verbal cues;contact guard  -AG     Dynamic Standing Balance standby assist;verbal cues;contact guard  -AG     Position/Device Used, Standing Balance walker, front-wheeled  -AG       Row Name 10/26/23 1406          Motor Skills    Motor Skills functional endurance;motor control/coordination interventions  -AG       Row Name             Wound 10/26/23 0622 Left anterior knee Pressure Injury    Wound - Properties Group Placement Date: 10/26/23  -ED Placement Time: 0622  -ED Present on Original Admission: Y  -ED Side: Left  -ED Orientation: anterior  -ED Location: knee  -ED Primary Wound Type: Pressure inj  -ED    Retired Wound - Properties Group Placement Date: 10/26/23  -ED Placement Time: 0622  -ED Present on Original Admission: Y  -ED Side: Left  -ED Orientation: anterior  -ED Location: knee  -ED Primary Wound Type: Pressure inj  -ED    Retired Wound - Properties Group Date first assessed: 10/26/23  -ED Time first assessed: 0622  -ED Present on Original Admission: Y  -ED Side: Left  -ED Location: knee  -ED Primary Wound Type: Pressure  inj  -ED      Row Name 10/26/23 1406          Coping    Observed Emotional State calm;cooperative  -AG     Verbalized Emotional State acceptance  -AG     Trust Relationship/Rapport care explained;choices provided;thoughts/feelings acknowledged  -AG     Family/Support Persons daughter  -AG     Involvement in Care not present at bedside  -AG     Family/Support System Care support provided;self-care encouraged  -AG       Row Name 10/26/23 1406          Plan of Care Review    Plan of Care Reviewed With patient  -AG     Outcome Evaluation PT evaluation complete.  Pt. able to perform supine>sit, STS w/ RW and CGA; ambulated forward, backward a few steps as well as lateral steps along bedside.  PT will continue to follow.  -AG       Row Name 10/26/23 1406          Vital Signs    Intra SpO2 (%) 87  -AG     O2 Delivery Intra Treatment supplemental O2  -AG     Post SpO2 (%) 94  -AG     O2 Delivery Post Treatment supplemental O2  -AG     Intra Patient Position Standing  -AG     Post Patient Position Sitting  -AG       Row Name 10/26/23 1406          Positioning and Restraints    Pre-Treatment Position in bed  -AG     Post Treatment Position bed  -AG     In Bed fowlers;call light within reach;encouraged to call for assist;exit alarm on;side rails up x3  -AG       Row Name 10/26/23 1406          Therapy Assessment/Plan (PT)    Patient/Family Therapy Goals Statement (PT) return home with family  -AG     Functional Level at Time of Evaluation (PT) min A/ CGA  -AG     PT Diagnosis (PT) impaired functional mobility/ endurance  -AG     Rehab Potential (PT) good, to achieve stated therapy goals  -AG     Criteria for Skilled Interventions Met (PT) yes;meets criteria  -AG     Therapy Frequency (PT) 2 times/wk  2-5 times/ week per priority  -AG     Predicted Duration of Therapy Intervention (PT) LOS  -AG     Problem List (PT) problems related to;balance;mobility;strength  -AG     Activity Limitations Related to Problem List (PT) unable  to ambulate safely;unable to transfer safely;BADLs not performed adequately or safely  -AG       Row Name 10/26/23 1406          Therapy Plan Review/Discharge Plan (PT)    Therapy Plan Review (PT) evaluation/treatment results reviewed;care plan/treatment goals reviewed;risks/benefits reviewed;current/potential barriers reviewed;participants voiced agreement with care plan;participants included;patient  -AG       Row Name 10/26/23 1406          Physical Therapy Goals    Bed Mobility Goal Selection (PT) bed mobility, PT goal 1  -AG     Transfer Goal Selection (PT) transfer, PT goal 1  -AG     Gait Training Goal Selection (PT) gait training, PT goal 1  -AG       Row Name 10/26/23 1406          Bed Mobility Goal 1 (PT)    Activity/Assistive Device (Bed Mobility Goal 1, PT) bed mobility activities, all  -AG     [place] Level/Cues Needed (Bed Mobility Goal 1, PT) independent  -AG     Time Frame (Bed Mobility Goal 1, PT) by discharge  -AG       Row Name 10/26/23 1406          Transfer Goal 1 (PT)    Activity/Assistive Device (Transfer Goal 1, PT) sit-to-stand/stand-to-sit;bed-to-chair/chair-to-bed;toilet  -AG     [place] Level/Cues Needed (Transfer Goal 1, PT) modified independence  -AG     Time Frame (Transfer Goal 1, PT) by discharge  -AG       Row Name 10/26/23 1406          Gait Training Goal 1 (PT)    Activity/Assistive Device (Gait Training Goal 1, PT) gait (walking locomotion);assistive device use;decrease fall risk;diminish gait deviation;improve balance and speed;increase endurance/gait distance  appropriate a.d.  -AG     [place] Level (Gait Training Goal 1, PT) contact guard required  -AG     Distance (Gait Training Goal 1, PT) 25  -AG     Time Frame (Gait Training Goal 1, PT) by discharge  -AG               User Key  (r) = Recorded By, (t) = Taken By, (c) = Cosigned By      Initials Name Provider Type    AG Vale Almeida, PT Physical Therapist    Bonnie Forrest, RN Registered Nurse                     Physical Therapy Education       Title: PT OT SLP Therapies (Done)       Topic: Physical Therapy (Done)       Point: Mobility training (Done)       Learning Progress Summary             Patient Acceptance, E,D, VU,NR by  at 10/26/2023 1358                         Point: Home exercise program (Done)       Learning Progress Summary             Patient Acceptance, E,D, VU,NR by  at 10/26/2023 1358                         Point: Body mechanics (Done)       Learning Progress Summary             Patient Acceptance, E,D, VU,NR by  at 10/26/2023 1358                         Point: Precautions (Done)       Learning Progress Summary             Patient Acceptance, E,D, VU,NR by  at 10/26/2023 1358                                         User Key       Initials Effective Dates Name Provider Type Discipline     06/16/21 -  Vale Almeida, PT Physical Therapist PT                  PT Recommendation and Plan  Planned Therapy Interventions (PT): balance training, bed mobility training, gait training, home exercise program, strengthening, transfer training, postural re-education, patient/family education  Therapy Frequency (PT): 2 times/wk (2-5 times/ week per priority)  Plan of Care Reviewed With: patient  Outcome Evaluation: PT evaluation complete.  Pt. able to perform supine>sit, STS w/ RW and CGA; ambulated forward, backward a few steps as well as lateral steps along bedside.  PT will continue to follow.       Time Calculation:    PT Charges       Row Name 10/26/23 1358             Time Calculation    PT Received On 10/26/23  -      PT Goal Re-Cert Due Date 11/09/23 -                User Key  (r) = Recorded By, (t) = Taken By, (c) = Cosigned By      Initials Name Provider Type     Vale Almeida, PT Physical Therapist                  Therapy Charges for Today       Code Description Service Date Service Provider Modifiers Qty    [id number]  PT EVAL MOD COMPLEXITY 4 10/26/2023 Vale Almeida, PT GP  1            PT G-Codes  AM-PAC 6 Clicks Score (PT): 18    Vale Almeida, PT  10/26/2023

## 2023-10-26 NOTE — PROGRESS NOTES
Pharmacy was consulted to dose cefepime for pneumonia. Based on an estimated CrCl of 30mL/min, an extended infusion cefepime dose of 2g q12hr has been ordered. Thank you for the consult.     Thank you,   Syeda Wisdom, PharmD  03:25 EDT

## 2023-10-26 NOTE — Clinical Note
Level of Care: Telemetry [5]   Diagnosis: Acute respiratory failure with hypoxemia [[id number]]   Admitting Physician: ESTHER SHAW [[id number]]   Attending Physician: ESTHER SHAW [[id number]]   Certification: I Certify That Inpatient Hospital Services Are Medically Necessary For Greater Than 2 Midnights

## 2023-10-26 NOTE — CASE MANAGEMENT/SOCIAL WORK
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  Patient's PCP is Johann Juárez and she uses [facility] for her prescription needs.  Patient denies any insurance or financial issues at this time.  Patient does not utilize Home Health however, son has requested at discharge and she lives in [facility].  Patient has Continuous O2 @ 2L, Cane & Nebulizer that she obtained from [facility].  Patient's son will provide transportation at discharge.  No other issues or concerns are noted at this time.  SS has been consulted and will continue to follow and assist with any discharge needs.   Discharge Needs Assessment       Row Name 10/26/23 1544       Living Environment    People in Home alone    Current Living Arrangements other (see comments)  Mobile Home    Duration at Residence 10-15 yrs    Potentially Unsafe Housing Conditions none    Primary Care Provided by self    Provides Primary Care For no one, unable/limited ability to care for self    Family Caregiver if Needed child(adam), adult    Family Caregiver Names Robert can assist if needed    Quality of Family Relationships helpful;involved;supportive    Able to Return to Prior Arrangements no    Living Arrangement Comments Son said he will need make other arrangements that she can not stay by herself any more.       Resource/Environmental Concerns    Resource/Environmental Concerns none    Transportation Concerns none       Transition Planning    Patient/Family Anticipates Transition to other (see comments)  Unknown at this time    Patient/Family Anticipated Services at Transition other (see comments)  Unknown at this time    Transportation Anticipated family or  friend will provide       Discharge Needs Assessment    Readmission Within the Last 30 Days no previous admission in last 30 days    Equipment Currently Used at Home oxygen;cane, straight;nebulizer;bp cuff  2L N/C continuous with [facility]    Concerns to be Addressed discharge planning    Anticipated Changes Related to Illness none    Equipment Needed After Discharge commode;hospital bed    Discharge Facility/Level of Care Needs home with home health    Provided Post Acute Provider List? Refused    Refused Provider List Comment Patient utilized [facility]    Provided Post Acute Provider Quality & Resource List? 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  Patient's PCP is Johann Juárez and she uses [facility] for her prescription needs.  Patient denies any insurance or financial issues at this time.  Patient does not utilize Home Health however, son has requested at discharge and she lives in [facility].  Patient has Continuous O2 @ 2L, Cane & Nebulizer that she obtained from [facility].  Patient's son will provide transportation at discharge.  No other issues or concerns are noted at this time.  SS has been consulted and will continue to follow and assist with any discharge needs.    Patient/Family in Agreement with Plan yes                  Continued Care and Services - Admitted Since 10/26/2023    Coordination has not been started for this encounter.       Expected Discharge Date and Time        Expected Discharge Date Expected Discharge Time    Oct 31, 2023            Demographic Summary       Row Name 10/26/23 1543       General Information    Admission Type inpatient    Arrived From home;emergency department    Referral Source admission list;high risk screening    Reason for Consult discharge planning;other (see comments)  CM Trigger    Preferred Language English                   Functional Status       Row Name 10/26/23 1543       Functional Status    Usual Activity Tolerance good    Current Activity Tolerance good       Functional Status, IADL    Medications independent    Meal Preparation independent    Housekeeping independent    Laundry independent    Shopping independent       Mental Status    General Appearance WDL WDL       Mental Status Summary    Recent Changes in Mental Status/Cognitive Functioning no changes       Employment/    Employment Status disabled    Current or Previous Occupation healthcare    Employment/ Comments Nurse Aide                   Psychosocial    No documentation.                  Abuse/Neglect    No documentation.                  Legal    No documentation.                  Substance Abuse    No documentation.                  Patient Forms    No documentation.                     Nhung Henning RN

## 2023-10-26 NOTE — PLAN OF CARE
Goal Outcome Evaluation:   VSS at this time. Patient A&Ox4, currently on bipap upon arrival to unit. Patient has been sinus tach, now NSR, EKG ordered and MD notified. Patient is resting with no complaints at this time. Call light in reach, bed alarm on, bed in lowest position. Plan of care ongoing 7p-7a.

## 2023-10-26 NOTE — PLAN OF CARE
Goal Outcome Evaluation:                 Problem: Fall Injury Risk  Goal: Absence of Fall and Fall-Related Injury  Intervention: Identify and Manage Contributors  Recent Flowsheet Documentation  Taken 10/26/2023 1500 by Sharon Villarreal RN  Medication Review/Management: medications reviewed  Taken 10/26/2023 1300 by Sharon Villarreal RN  Medication Review/Management: medications reviewed  Taken 10/26/2023 1100 by Sharon Villarreal RN  Medication Review/Management: medications reviewed  Taken 10/26/2023 0900 by Sharon Villarreal RN  Medication Review/Management: medications reviewed  Taken 10/26/2023 0700 by Sharon Villarreal RN  Medication Review/Management: medications reviewed     Problem: Skin Injury Risk Increased  Goal: Skin Health and Integrity  Outcome: Ongoing, Progressing     Problem: Adjustment to Illness (Sepsis/Septic Shock)  Goal: Optimal Coping  Outcome: Ongoing, Progressing  Intervention: Optimize Psychosocial Adjustment to Illness  Recent Flowsheet Documentation  Taken 10/26/2023 1400 by Sharon Villarreal RN  Family/Support System Care: support provided  Taken 10/26/2023 0835 by Sharon Villarreal RN  Family/Support System Care: support provided          Pt is lethargic, easily aroused. Oriented. VSS on 3LNC. IV abx infusing at this time. PRN pain medication given for complaints of back pain. Adequate UOP. Safety maintained. Will continue to follow plan of care 7a-7p.

## 2023-10-26 NOTE — NURSING NOTE
Wound consult for bruise and abrasion to the LT knee. Area presents with a reddish/purplish discoloration with dry scabbing noted. The surrounding tissue is dry and flaky. PT reports to have fallen at home on both her knees. New order for Venelex BID and PRN.

## 2023-10-26 NOTE — ED PROVIDER NOTES
Subjective   History of Present Illness  Patient is a 72-year-old female with history significant for hypertension/hyperlipidemia and COPD with nocturnal use of oxygen who comes to the ER with 1 to 2-week history of worsening shortness of breath, productive yellow sputum and presyncopal episode at home.  Today she says she fell walking out to her porch.  She felt lightheaded and dizzy and went down.  She had to crawl back in and her family helped her up.  She notes feeling short of breath for the past 1 to 2 weeks with productive yellow sputum.  Subjective fevers and chills.  She does not wear O2 during the day.  Denies any chest pain/pressure or tightness.  No known sick contacts.      Review of Systems   Constitutional:  Positive for chills. Negative for fatigue and fever.   HENT:  Negative for ear pain, sinus pain and sore throat.    Respiratory:  Positive for cough, shortness of breath and wheezing. Negative for chest tightness.    Cardiovascular:  Negative for chest pain, palpitations and leg swelling.   Gastrointestinal:  Negative for abdominal pain, constipation, diarrhea, nausea and vomiting.   Genitourinary:  Negative for dysuria, hematuria and urgency.   Musculoskeletal:  Negative for arthralgias and myalgias.   Neurological:  Positive for weakness. Negative for dizziness, syncope and light-headedness.   Psychiatric/Behavioral:  Negative for confusion.        Past Medical History:   Diagnosis Date    Arthritis     COPD (chronic obstructive pulmonary disease)     Elevated cholesterol     High cholesterol     History of transfusion     Hyperlipidemia     Hypertension        No Known Allergies    Past Surgical History:   Procedure Laterality Date    BREAST BIOPSY Right     yrs ago benign    COLONOSCOPY      COLONOSCOPY N/A 5/10/2022    Procedure: COLONOSCOPY;  Surgeon: Kenia Braun MD;  Location: [facility];  Service: Gastroenterology;  Laterality: N/A;    ENDOSCOPY      ENDOSCOPY N/A 5/10/2022     Procedure: ESOPHAGOGASTRODUODENOSCOPY WITH BIOPSY;  Surgeon: Kenia Braun MD;  Location: [facility];  Service: Gastroenterology;  Laterality: N/A;    HIP SURGERY Right        Family History   Problem Relation Age of Onset    No Known Problems Mother     No Known Problems Father     No Known Problems Sister     No Known Problems Brother     No Known Problems Son     No Known Problems Daughter     No Known Problems Maternal Grandmother     No Known Problems Maternal Grandfather     No Known Problems Paternal Grandmother     No Known Problems Paternal Grandfather     No Known Problems Cousin     Rheum arthritis Neg Hx     Osteoarthritis Neg Hx     Asthma Neg Hx     Diabetes Neg Hx     Heart failure Neg Hx     Hyperlipidemia Neg Hx     Hypertension Neg Hx     Migraines Neg Hx     Rashes / Skin problems Neg Hx     Seizures Neg Hx     Stroke Neg Hx     Thyroid disease Neg Hx     Breast cancer Neg Hx        Social History     Socioeconomic History    Marital status:    Tobacco Use    Smoking status: Every Day     Packs/day: 1.00     Years: 30.00     Additional pack years: 0.00     Total pack years: 30.00     Types: Cigarettes    Smokeless tobacco: Never   Vaping Use    Vaping Use: Never used   Substance and Sexual Activity    Alcohol use: No    Drug use: No    Sexual activity: Defer           Objective   Physical Exam  Vitals and nursing note reviewed.   Constitutional:       General: She is not in acute distress.     Appearance: She is normal weight. She is ill-appearing.   HENT:      Head: Normocephalic and atraumatic.      Mouth/Throat:      Mouth: Mucous membranes are moist.      Pharynx: Oropharynx is clear.   Eyes:      Extraocular Movements: Extraocular movements intact.      Pupils: Pupils are equal, round, and reactive to light.   Cardiovascular:      Rate and Rhythm: Regular rhythm. Tachycardia present.   Pulmonary:      Effort: Pulmonary effort is normal.      Breath sounds: Examination  of the right-upper field reveals wheezing and rhonchi. Examination of the left-upper field reveals wheezing and rhonchi. Examination of the right-middle field reveals wheezing. Examination of the left-middle field reveals wheezing. Wheezing and rhonchi present.   Musculoskeletal:         General: Normal range of motion.      Cervical back: Normal range of motion and neck supple.      Right lower leg: No edema.      Left lower leg: No edema.   Skin:     General: Skin is warm and dry.      Capillary Refill: Capillary refill takes less than 2 seconds.   Neurological:      General: No focal deficit present.      Mental Status: She is alert and oriented to person, place, and time.   Psychiatric:         Mood and Affect: Mood normal.         Behavior: Behavior normal.         Procedures           ED Course  ED Course as of 10/26/23 0316   Wed Oct 25, 2023   2315 ECG 12 Lead Syncope  Sinus tachycardia, rate 110, QTc 424, no acute ST or T wave changes [CW]      ED Course User Index  [CW] Amos Vinson, DO      XR Chest 1 View    Result Date: 10/26/2023   1.  Coarsened bronchovascular pattern to the lungs with features of underlying chronic interstitial lung disease. 2.  Possible mild interstitial edema. 3.  Mild hypoinflated lungs. 4.  No pleural effusion or pneumothorax. 5.  No fracture or foreign body.  This report was finalized on 10/26/2023 12:08 AM by Dimas Judd MD.       Results for orders placed or performed during the hospital encounter of 10/26/23   MRSA Screen, PCR (Inpatient) - Swab, Nares    Specimen: Nares; Swab   Result Value Ref Range    MRSA PCR No MRSA Detected No MRSA Detected   COVID-19 and FLU A/B PCR - Swab, Nasopharynx    Specimen: Nasopharynx; Swab   Result Value Ref Range    COVID19 Not Detected Not Detected - Ref. Range    Influenza A PCR Not Detected Not Detected    Influenza B PCR Not Detected Not Detected   Comprehensive Metabolic Panel    Specimen: Arm, Right; Blood   Result Value  Ref Range    Glucose 129 (H) 65 - 99 mg/dL    BUN 34 (H) 8 - 23 mg/dL    Creatinine 1.28 (H) 0.57 - 1.00 mg/dL    Sodium 132 (L) 136 - 145 mmol/L    Potassium 5.1 3.5 - 5.2 mmol/L    Chloride 94 (L) 98 - 107 mmol/L    CO2 28.5 22.0 - 29.0 mmol/L    Calcium 9.0 8.6 - 10.5 mg/dL    Total Protein 6.5 6.0 - 8.5 g/dL    Albumin 3.4 (L) 3.5 - 5.2 g/dL    ALT (SGPT) 13 1 - 33 U/L    AST (SGOT) 22 1 - 32 U/L    Alkaline Phosphatase 79 39 - 117 U/L    Total Bilirubin 0.2 0.0 - 1.2 mg/dL    Globulin 3.1 gm/dL    A/G Ratio 1.1 g/dL    BUN/Creatinine Ratio 26.6 (H) 7.0 - 25.0    Anion Gap 9.5 5.0 - 15.0 mmol/L    eGFR 44.9 (L) >60.0 mL/min/1.73   Lactic Acid, Plasma    Specimen: Arm, Left; Blood   Result Value Ref Range    Lactate 1.2 0.5 - 2.0 mmol/L   CBC Auto Differential    Specimen: Arm, Right; Blood   Result Value Ref Range    WBC 6.97 3.40 - 10.80 10*3/mm3    RBC 4.20 3.77 - 5.28 10*6/mm3    Hemoglobin 12.0 12.0 - 15.9 g/dL    Hematocrit 38.1 34.0 - 46.6 %    MCV 90.7 79.0 - 97.0 fL    MCH 28.6 26.6 - 33.0 pg    MCHC 31.5 31.5 - 35.7 g/dL    RDW 15.2 12.3 - 15.4 %    RDW-SD 51.4 37.0 - 54.0 fl    MPV 10.9 6.0 - 12.0 fL    Platelets 223 140 - 450 10*3/mm3   Blood Gas, Arterial With Co-Ox    Specimen: Arterial Blood   Result Value Ref Range    Site Left Brachial     Giovany's Test Positive     pH, Arterial 7.401 7.350 - 7.450 pH units    pCO2, Arterial 47.0 (H) 35.0 - 45.0 mm Hg    pO2, Arterial 55.2 (L) 83.0 - 108.0 mm Hg    HCO3, Arterial 29.2 (H) 20.0 - 26.0 mmol/L    Base Excess, Arterial 3.7 (H) 0.0 - 2.0 mmol/L    O2 Saturation, Arterial 89.0 (L) 94.0 - 99.0 %    Hemoglobin, Blood Gas 11.6 (L) 13.5 - 17.5 g/dL    Hematocrit, Blood Gas 35.6 (L) 38.0 - 51.0 %    Oxyhemoglobin 87.1 (L) 94 - 99 %    Methemoglobin <-0.10 (L) 0.00 - 3.00 %    Carboxyhemoglobin 2.5 0 - 5 %    A-a DO2 112.1 0.0 - 300.0 mmHg    CO2 Content 30.6 22 - 33 mmol/L    Barometric Pressure for Blood Gas 733 mmHg    Modality Nasal Cannula     FIO2 32 %     Flow Rate 3.0 lpm    Ventilator Mode NA     Collected by [id number]     pH, Temp Corrected      pCO2, Temperature Corrected      pO2, Temperature Corrected     High Sensitivity Troponin T    Specimen: Arm, Right; Blood   Result Value Ref Range    HS Troponin T 34 (H) <10 ng/L   C-reactive Protein    Specimen: Arm, Right; Blood   Result Value Ref Range    C-Reactive Protein 42.39 (H) 0.00 - 0.50 mg/dL   Procalcitonin    Specimen: Arm, Right; Blood   Result Value Ref Range    Procalcitonin 7.30 (H) 0.00 - 0.25 ng/mL   Scan Slide    Specimen: Arm, Right; Blood   Result Value Ref Range    Scan Slide     Manual Differential    Specimen: Arm, Right; Blood   Result Value Ref Range    Neutrophil % 51.0 42.7 - 76.0 %    Lymphocyte % 24.0 19.6 - 45.3 %    Monocyte % 14.0 (H) 5.0 - 12.0 %    Basophil % 1.0 0.0 - 1.5 %    Bands %  6.0 (H) 0.0 - 5.0 %    Metamyelocyte % 4.0 (H) 0.0 - 0.0 %    Neutrophils Absolute 3.97 1.70 - 7.00 10*3/mm3    Lymphocytes Absolute 1.67 0.70 - 3.10 10*3/mm3    Monocytes Absolute 0.98 (H) 0.10 - 0.90 10*3/mm3    Basophils Absolute 0.07 0.00 - 0.20 10*3/mm3    RBC Morphology Normal Normal    Platelet Morphology Normal Normal   BNP    Specimen: Arm, Right; Blood   Result Value Ref Range    proBNP 2,082.0 (H) 0.0 - 900.0 pg/mL   ECG 12 Lead Syncope   Result Value Ref Range    QT Interval 314 ms    QTC Interval 424 ms   Green Top (Gel)   Result Value Ref Range    Extra Tube Hold for add-ons.    Lavender Top   Result Value Ref Range    Extra Tube hold for add-on    Gold Top - SST   Result Value Ref Range    Extra Tube Hold for add-ons.    Light Blue Top   Result Value Ref Range    Extra Tube Hold for add-ons.                                           Medical Decision Making  --Patient appears acutely ill but nontoxic.  In triage she was 75% on room air.  ABG noted acute hypoxemia and was placed on 3.5 L nasal cannula.  Physical exam with coarse rhonchi and significant Speier Tory wheezing  --Given  tachycardia and acute hypoxemia, rule out PE  --EKG noted sinus tach without acute ischemic changes  --Labs ordered  --CT PE protocol ordered  --IV sepsis bolus, vancomycin, cefepime for empiric coverage, IV Solu-Medrol    I assumed care of this patient at shift change.  Persistent acute on chronic respiratory failure with hypoxia.  Concerns for possible COPD exacerbation versus CHF exacerbation.  However, patient does have increasing elevated inflammatory markers.  Cannot rule out pneumonia.  CT PE study pending.  Recommend admission for further work up and treatment.  Hospitalist team consulted and made aware of the patient.  Consults and orders placed per hospitalist request.  Patient was agreeable to admission plan.  Vitals stable on admission.    Problems Addressed:  Acute on chronic respiratory failure with hypoxia: complicated acute illness or injury  COPD exacerbation: complicated acute illness or injury  Sepsis, due to unspecified organism, unspecified whether acute organ dysfunction present: complicated acute illness or injury    Amount and/or Complexity of Data Reviewed  Labs: ordered.  Radiology: ordered.  ECG/medicine tests: ordered. Decision-making details documented in ED Course.    Risk  Prescription drug management.  Decision regarding hospitalization.        Final diagnoses:   Acute on chronic respiratory failure with hypoxia   Sepsis, due to unspecified organism, unspecified whether acute organ dysfunction present   COPD exacerbation       ED Disposition  ED Disposition       ED Disposition   Decision to Admit    Condition   --    Comment   --               No follow-up provider specified.       Medication List      No changes were made to your prescriptions during this visit.            Georgi Teresa, DO  10/26/23 0316       Georgi Teresa, DO  10/26/23 0316

## 2023-10-26 NOTE — PAYOR COMM NOTE
"Dorothy Herzog (72 y.o. Female)       Date of Birth   1950    Social Security Number       Address   [address] [place] [id number]    Home Phone   [phone]    MRN   [id number]       [facility]    Marital Status                               Admission Date   10/26/23    Admission Type   Emergency    Admitting Provider   Hector Anaya MD    Attending Provider   Leroy Mosher MD    Department, Room/Bed   [facility], P214/S2       Discharge Date       Discharge Disposition       Discharge Destination                                 Attending Provider: Leroy Mosher MD    Allergies: No Known Allergies    Isolation: None   Infection: None   Code Status: CPR    Ht: 154.9 cm (61\")   Wt: 49.6 kg (109 lb 5.6 oz)    Admission Cmt: None   Principal Problem: Acute respiratory failure with hypoxemia [J96.01]                   Active Insurance as of 10/26/2023       Primary Coverage       Payor Plan Insurance Group Employer/Plan Group    ANTHEM MEDICARE REPLACEMENT ANTHEM MEDICARE ADVANTAGE [id number]       Payor Plan Address Payor Plan Phone Number Payor Plan Fax Number Effective Dates    [address] [phone]  5/1/2021 - None Entered    [facility] [address]         Subscriber Name Subscriber Birth Date Member ID       DOROTHY HERZOG 1950 [id number]               Secondary Coverage       Payor Plan Insurance Group Employer/Plan Group    [employer] [place] [employer]ARE MEDICAID        Payor Plan Address Payor Plan Phone Number Payor Plan Fax Number Effective Dates    [address] [address] [phone]  6/24/2017 - None Entered    [facility] [id number]         Subscriber Name Subscriber Birth Date Member ID       DOROTHY HERZOG 1950 [id number]                     Emergency Contacts        (Rel.) Home Phone Work Phone Mobile Phone    GuevaraIvonne (Friend) [phone] -- --    Robert Low (Son) [phone] " [phone]                 History & Physical        Hector Anaya MD at 10/26/23 0344          Hospitalist History and Physical        Patient Identification  Name: Dorothy Cespedes  Age/Sex: 72 y.o. female  :  1950        MRN: [id number]  Visit Number: [id number]  Admit Date: 10/26/2023   PCP: Johann Juárez MD          Chief complaint short of breath    History of Present Illness:  Patient is a 72 y.o. female with history of COPD on 2L NC at home but only at night as needed, HTN, HLD, grade 1 diastolic CHF with preserved EF per ECHO earlier this month, and arthritis, who presents with complaints of worsening shortness of breath over the last week or so. She reports increased cough with yellow sputum production during this time. She reports increased wheezing and subjective fevers as well. She denies any lower extremity edema at this time. She denies any known sick contacts. She reports about 2 weeks ago, her PCP prescribed her a 7 day course of steroids for her worsening symptoms. In addition, she has been using her home supplemental oxygen and breathing treatments more. However, her symptoms continued to worsen, and today she became so short of breath and weak that she fell to the ground and nearly passed out, which prompted her to come to the ED tonight. In the ED, she was noted to have a pulse oximetry of 75% on RA. She was placed on 3L NC and O2 sat improved to as high as 89%. ABG showed CO2 47 with compensated pH 7.401, PO2 55 and O2 sat 89% on 3L NC so she was increased to 3.5L. She received IV solu-medrol, duoneb treatments, and IV vancomycin and cefepime and O2 sat improved to as high as 98% so she was weaned back to 3L NC. Over the course of the last couple hours, O2 sat has started dropping again, down to as low as 87% while I was in the room with her, so she has been turned back up to 3.5L NC. Labs show elevated troponin of 34, elevated BNP 2082, Cr 1.28 (up from 0.81 on  10/2/23), BUN 34 with BUN:cr ratio of 26.6, CRP 42, Procal 7.3, lactate 1.2 and WBC 6.97. CXR showed chronic interstitial lung disease and possible mild interstitial edema. CT chest PE protocol is pending, but per my personal review, she appears to have a left lower lobe pneumonia. She is being admitted to the telemetry unit for further evaluation and management.     Review of Systems  Review of Systems   Constitutional:  Positive for activity change, chills, fatigue and fever. Negative for appetite change, diaphoresis and unexpected weight change.   HENT:  Negative for congestion, postnasal drip, rhinorrhea, sinus pressure, sinus pain and sore throat.    Eyes:  Negative for photophobia, pain, discharge, redness, itching and visual disturbance.   Respiratory:  Positive for cough, shortness of breath and wheezing.    Cardiovascular:  Positive for chest pain (associated with cough and deep inspiration). Negative for palpitations and leg swelling.   Gastrointestinal:  Negative for abdominal distention, abdominal pain, constipation, diarrhea, nausea and vomiting.   Endocrine: Negative for cold intolerance, heat intolerance, polydipsia, polyphagia and polyuria.   Genitourinary:  Negative for difficulty urinating, dysuria, flank pain, frequency and hematuria.   Musculoskeletal:  Positive for arthralgias (chronic) and back pain (chronic). Negative for joint swelling, myalgias, neck pain and neck stiffness.   Skin:  Negative for color change, pallor, rash and wound.   Neurological:  Positive for dizziness, weakness (generalized) and light-headedness. Negative for seizures, numbness and headaches.   Hematological:  Negative for adenopathy. Does not bruise/bleed easily.   Psychiatric/Behavioral:  Negative for agitation, behavioral problems and confusion.        History  Past Medical History:   Diagnosis Date    Arthritis     COPD (chronic obstructive pulmonary disease)     Elevated cholesterol     High cholesterol     History  of transfusion     Hyperlipidemia     Hypertension      Past Surgical History:   Procedure Laterality Date    BREAST BIOPSY Right     yrs ago benign    COLONOSCOPY      COLONOSCOPY N/A 5/10/2022    Procedure: COLONOSCOPY;  Surgeon: Kenia Braun MD;  Location: [facility];  Service: Gastroenterology;  Laterality: N/A;    ENDOSCOPY      ENDOSCOPY N/A 5/10/2022    Procedure: ESOPHAGOGASTRODUODENOSCOPY WITH BIOPSY;  Surgeon: Kenia Braun MD;  Location: [facility];  Service: Gastroenterology;  Laterality: N/A;    HIP SURGERY Right      Family History   Problem Relation Age of Onset    No Known Problems Mother     No Known Problems Father     No Known Problems Sister     No Known Problems Brother     No Known Problems Son     No Known Problems Daughter     No Known Problems Maternal Grandmother     No Known Problems Maternal Grandfather     No Known Problems Paternal Grandmother     No Known Problems Paternal Grandfather     No Known Problems Cousin     Rheum arthritis Neg Hx     Osteoarthritis Neg Hx     Asthma Neg Hx     Diabetes Neg Hx     Heart failure Neg Hx     Hyperlipidemia Neg Hx     Hypertension Neg Hx     Migraines Neg Hx     Rashes / Skin problems Neg Hx     Seizures Neg Hx     Stroke Neg Hx     Thyroid disease Neg Hx     Breast cancer Neg Hx      Social History     Tobacco Use    Smoking status: Every Day     Packs/day: 1.00     Years: 30.00     Additional pack years: 0.00     Total pack years: 30.00     Types: Cigarettes    Smokeless tobacco: Never   Vaping Use    Vaping Use: Never used   Substance Use Topics    Alcohol use: No    Drug use: No     (Not in a hospital admission)    Allergies:  Patient has no known allergies.    Objective    Vital Signs  Temp:  [98.4 °F (36.9 °C)] 98.4 °F (36.9 °C)  Heart Rate:  [] 115  Resp:  [20-28] 20  BP: ()/(55-96) 128/80  Body mass index is 19.27 kg/m².    Physical Exam:  Physical Exam  Constitutional:       General: She is not in  acute distress.     Comments: Elderly female, cachectic, both acutely and chronically ill in appearance   HENT:      Head: Normocephalic and atraumatic.      Right Ear: External ear normal.      Left Ear: External ear normal.      Nose: Nose normal.      Mouth/Throat:      Mouth: Mucous membranes are moist.      Pharynx: Oropharynx is clear.   Eyes:      Extraocular Movements: Extraocular movements intact.      Conjunctiva/sclera: Conjunctivae normal.      Pupils: Pupils are equal, round, and reactive to light.   Cardiovascular:      Rate and Rhythm: Regular rhythm. Tachycardia present.      Pulses: Normal pulses.      Heart sounds: Normal heart sounds. No murmur heard.  Pulmonary:      Comments: Tachypneic, diffuse expiratory wheezing on auscultation, with rhonchi noted in bilateral bases  Abdominal:      General: Abdomen is flat. Bowel sounds are normal. There is no distension.      Palpations: Abdomen is soft.      Tenderness: There is no abdominal tenderness.   Musculoskeletal:         General: Normal range of motion.      Cervical back: Normal range of motion and neck supple. No tenderness.      Right lower leg: No edema.      Left lower leg: No edema.   Lymphadenopathy:      Cervical: No cervical adenopathy.   Skin:     General: Skin is warm and dry.      Capillary Refill: Capillary refill takes less than 2 seconds.      Coloration: Skin is not jaundiced.      Findings: No bruising or lesion.   Neurological:      General: No focal deficit present.      Mental Status: She is alert and oriented to person, place, and time.   Psychiatric:         Mood and Affect: Mood normal.         Behavior: Behavior normal.           Results Review:       I have personally reviewed the patient's radiologic imaging.        EKG:   Sinus tachycardia, , QTc 424  Otherwise normal ECG  When compared with ECG of 01-OCT-2023 20:52,  Vent. rate has increased BY  51 BPM  Criteria for Septal infarct are no longer present  T wave  inversion no longer evident in Anterior leads    I have personally reviewed the patient's EKG. No overt ST changes appreciated.         Assessment & Plan    - Acute respiratory failure with hypoxemia: secondary to COPD exacerbation and likely underlying left lower lobe pneumonia, awaiting CT PE protocol results to confirm and to rule out PE. Treat COPD exacerbation with IV solu-medrol and scheduled nebs. Treat possible pneumonia with IV cefepime and azithromycin. MRSA nares negative so hold off on IV vancomycin. Check respiratory PCR panel, urine legionella antigen to rule out atypical organisms. Check urine strep pneumoniae antigen as well. Continue to trend CRP. Follow up blood cultures collected in the ED. O2 sat had improved after initial treatment and NC was turned from 3.5 to 3L, but now hypoxic again, so increasing back to 3.5L and will repeat ABG in about 30 minutes.   - Sepsis, present on admission with tachycardia, tachypnea, CRP and procal elevation, secondary to left lower lobe pneumonia: see plan above.  - Acute renal insufficiency vs ALLISON: Cr 1.28, up from 0.81 as of 10/2/23 which appears to be her baseline. Received IV fluid bolus in the ED. Avoid nephrotoxic agents and repeat labs later this morning.   - Elevated BNP, troponin: suspect secondary to hypoxia and sepsis/pneumonia above, along with elevated creatinine. Repeat troponin pending, but denies chest pressure and EKG not felt to show any evidence of acute ischemia. CXR comments on possible mild interstitial edema, but no rales or lower extremity edema appreciated on exam. Hold diuresis while awaiting CT chest PE protocol results, as clinically patient appears dehydrated rather than fluid overloaded at this time.   - Hypertension: BP currently stable at 138/82. Review home meds once reconciled by pharmacy.     DVT Prophylaxis: SQ heparin    Estimated Length of Stay >2 midnights    I discussed the patient's findings, assessment and plan with the  patient and ED provider Dr Teresa.    Patient is high risk due to acute hypoxic respiratory failure, COPD exacerbation, sepsis secondary to pneumonia, ALLISON vs acute renal insufficiency, elevated BNP and troponin     Hector Anaya MD  10/26/23  03:44 EDT  --------------------------------------------------------------------------------------------------------------------  Update: repeat ABG now shows worsening hypoxia as well as worsening hypercarbia and development of respiratory acidosis, so will start bipap and admit to PCU instead of telemetry.     Electronically signed by Hector Anaya MD at 10/26/23 0423          Emergency Department Notes          Georgi Teresa DO at 10/26/23 0118          Subjective   History of Present Illness  Patient is a 72-year-old female with history significant for hypertension/hyperlipidemia and COPD with nocturnal use of oxygen who comes to the ER with 1 to 2-week history of worsening shortness of breath, productive yellow sputum and presyncopal episode at home.  Today she says she fell walking out to her porch.  She felt lightheaded and dizzy and went down.  She had to crawl back in and her family helped her up.  She notes feeling short of breath for the past 1 to 2 weeks with productive yellow sputum.  Subjective fevers and chills.  She does not wear O2 during the day.  Denies any chest pain/pressure or tightness.  No known sick contacts.      Review of Systems   Constitutional:  Positive for chills. Negative for fatigue and fever.   HENT:  Negative for ear pain, sinus pain and sore throat.    Respiratory:  Positive for cough, shortness of breath and wheezing. Negative for chest tightness.    Cardiovascular:  Negative for chest pain, palpitations and leg swelling.   Gastrointestinal:  Negative for abdominal pain, constipation, diarrhea, nausea and vomiting.   Genitourinary:  Negative for dysuria, hematuria and urgency.   Musculoskeletal:  Negative for arthralgias  and myalgias.   Neurological:  Positive for weakness. Negative for dizziness, syncope and light-headedness.   Psychiatric/Behavioral:  Negative for confusion.            Objective   Physical Exam  Vitals and nursing note reviewed.   Constitutional:       General: She is not in acute distress.     Appearance: She is normal weight. She is ill-appearing.   HENT:      Head: Normocephalic and atraumatic.      Mouth/Throat:      Mouth: Mucous membranes are moist.      Pharynx: Oropharynx is clear.   Eyes:      Extraocular Movements: Extraocular movements intact.      Pupils: Pupils are equal, round, and reactive to light.   Cardiovascular:      Rate and Rhythm: Regular rhythm. Tachycardia present.   Pulmonary:      Effort: Pulmonary effort is normal.      Breath sounds: Examination of the right-upper field reveals wheezing and rhonchi. Examination of the left-upper field reveals wheezing and rhonchi. Examination of the right-middle field reveals wheezing. Examination of the left-middle field reveals wheezing. Wheezing and rhonchi present.   Musculoskeletal:         General: Normal range of motion.      Cervical back: Normal range of motion and neck supple.      Right lower leg: No edema.      Left lower leg: No edema.   Skin:     General: Skin is warm and dry.      Capillary Refill: Capillary refill takes less than 2 seconds.   Neurological:      General: No focal deficit present.      Mental Status: She is alert and oriented to person, place, and time.   Psychiatric:         Mood and Affect: Mood normal.         Behavior: Behavior normal.         Procedures          ED Course  ED Course as of 10/26/23 0316   Wed Oct 25, 2023   2319 ECG 12 Lead Syncope  Sinus tachycardia, rate 110, QTc 424, no acute ST or T wave changes [CW]      ED Course User Index  [CW] Amos Vinson DO           Medical Decision Making  --Patient appears acutely ill but nontoxic.  In triage she was 75% on room air.  ABG noted acute  hypoxemia and was placed on 3.5 L nasal cannula.  Physical exam with coarse rhonchi and significant Speier Tory wheezing  --Given tachycardia and acute hypoxemia, rule out PE  --EKG noted sinus tach without acute ischemic changes  --Labs ordered  --CT PE protocol ordered  --IV sepsis bolus, vancomycin, cefepime for empiric coverage, IV Solu-Medrol    I assumed care of this patient at shift change.  Persistent acute on chronic respiratory failure with hypoxia.  Concerns for possible COPD exacerbation versus CHF exacerbation.  However, patient does have increasing elevated inflammatory markers.  Cannot rule out pneumonia.  CT PE study pending.  Recommend admission for further work up and treatment.  Hospitalist team consulted and made aware of the patient.  Consults and orders placed per hospitalist request.  Patient was agreeable to admission plan.  Vitals stable on admission.    Problems Addressed:  Acute on chronic respiratory failure with hypoxia: complicated acute illness or injury  COPD exacerbation: complicated acute illness or injury  Sepsis, due to unspecified organism, unspecified whether acute organ dysfunction present: complicated acute illness or injury    Amount and/or Complexity of Data Reviewed  Labs: ordered.  Radiology: ordered.  ECG/medicine tests: ordered. Decision-making details documented in ED Course.    Risk  Prescription drug management.  Decision regarding hospitalization.        Final diagnoses:   Acute on chronic respiratory failure with hypoxia   Sepsis, due to unspecified organism, unspecified whether acute organ dysfunction present   COPD exacerbation       ED Disposition  ED Disposition       ED Disposition   Decision to Admit    Condition   --    Comment   --               No follow-up provider specified.       Medication List      No changes were made to your prescriptions during this visit.            Georgi Teresa, DO  10/26/23 0316       Georgi Teresa, DO  10/26/23  0316      Electronically signed by Georgi Teresa DO at 10/26/23 0316       Ana Murcia APRN at 10/25/23 2327                   MEDICAL SCREENING:    Reason for Visit: SOA    Patient initially seen in triage.  The patient was advised further evaluation and diagnostic testing will be needed, some of the treatment and testing will be initiated in the lobby in order to begin the process.  The patient will be returned to the waiting area for the time being and possibly be re-assessed by a subsequent ED provider.  The patient will be brought back to the treatment area in as timely manner as possible.     Ana Murcia APRN  10/25/23 2327      Electronically signed by Ana Murcia APRN at 10/25/23 2327       Vital Signs (last day)       Date/Time Temp Temp src Pulse Resp BP Patient Position SpO2    10/26/23 1248 -- -- 88 18 -- -- 98    10/26/23 1234 -- -- 83 18 -- -- 93    10/26/23 1200 -- -- 86 23 101/65 -- 91    10/26/23 1100 -- -- 89 24 105/65 -- 95    10/26/23 1027 -- -- -- -- -- -- 95    10/26/23 1000 -- -- 89 18 102/64 -- 92    10/26/23 0900 -- -- 85 20 116/73 -- 99    10/26/23 0819 -- -- 86 23 -- -- 97    10/26/23 0800 98.2 (36.8) Axillary -- -- -- -- --    10/26/23 0700 -- -- 92 22 100/61 -- 96    10/26/23 0624 -- -- 93 23 -- -- 98    10/26/23 0600 -- -- 92 24 95/61 Lying 99    10/26/23 0517 -- -- 98 26 121/53 Lying 93    10/26/23 0510 -- -- 101 26 -- -- 98    10/26/23 0445 -- -- 103 -- 99/59 -- 94    10/26/23 0430 -- -- 108 -- 116/62 -- 93    10/26/23 0351 -- -- 112 -- -- -- 95    10/26/23 0345 -- -- 114 -- 138/82 -- --    10/26/23 0341 -- -- 115 20 -- -- 87    10/26/23 0331 -- -- 115 -- -- -- 92    10/26/23 0330 -- -- 112 -- 128/80 -- --    10/26/23 0307 -- -- 104 -- -- -- 95    10/26/23 0300 -- -- 101 -- 117/68 -- 95    10/26/23 0245 -- -- 99 -- 117/71 -- 94    10/26/23 0230 -- -- 101 -- 116/68 -- 94    10/26/23 0216 -- -- 102 -- -- -- 95    10/26/23 0215 -- -- 102 -- 111/66 -- --     10/26/23 0200 -- -- 101 -- 111/96 -- 97    10/26/23 0144 -- -- 102 -- 116/67 -- 98    10/26/23 0130 -- -- 104 22 -- -- 92    10/26/23 0023 -- -- 106 26 112/64 Sitting 93    10/25/23 2300 -- -- -- -- -- -- 90     10/25/23 2255 -- -- -- -- -- -- 89     10/25/23 2250 -- -- -- -- -- -- 85     10/25/23 2246 -- -- -- -- -- -- 80     10/25/23 2243 98.4 (36.9) Oral 118 28 99/55 Sitting 75           Intake & Output (last day)         10/25 0701  10/26 0700 10/26 0701  10/27 0700    P.O.  0    IV Piggyback 1739     Total Intake(mL/kg) 1739 (35.1) 0 (0)    Urine (mL/kg/hr)  300 (0.9)    Total Output  300    Net +1739 -300          Urine Unmeasured Occurrence 1 x     Stool Unmeasured Occurrence 0 x           Lines, Drains & Airways       Active LDAs       Name Placement date Placement time Site Days    Peripheral IV 10/26/23 0146 Right Antecubital 10/26/23  0146  Antecubital  less than 1              Inactive LDAs       None                  Medication Administration Report for Dorothy Cespedes as of 10/26/23 1341       Medications 10/26/23      azithromycin (ZITHROMAX) 500 mg in sodium chloride 0.9 % 250 mL IVPB-VTB  Dose: 500 mg  Freq: Every 24 Hours Route: IV  Indications of Use: EXACERBATION OF CHRONIC OBSTRUCTIVE PULMONARY DISEASE,PNEUMONIA  Start: 10/26/23 0500   End: 10/29/23 0459   Admin Instructions:   Activate vial before use.    0419-New Bag                    castor oil-balsam peru (VENELEX) ointment 1 application   Dose: 1 application   Freq: Every 12 Hours Scheduled Route: TOP  Start: 10/26/23 1045   Admin Instructions:   Apply to LT knee bid and leave open to air.     1111-Given     2100                   cefepime 2 gm IVPB in 100 ml NS (VTB)  Dose: 2,000 mg  Freq: Every 12 Hours Route: IV  Indications of Use: PNEUMONIA  Start: 10/26/23 1400   End: 10/31/23 1359    1400                    guaiFENesin (MUCINEX) 12 hr tablet 600 mg  Dose: 600 mg  Freq: Every 12 Hours Scheduled Route: PO  Start: 10/26/23 1000    End: 10/29/23 0859   Admin Instructions:   Do not crush or chew the capsules or tablets. The drug may not work as designed if the capsule or tablet is crushed or chewed. Swallow whole.  Caution: Look alike/sound alike drug alert               Do not crush, split, or chew.    1007-Given     2100                   heparin (porcine) 5000 UNIT/ML injection 5,000 Units  Dose: 5,000 Units  Freq: Every 12 Hours Scheduled Route: SC  Indications of Use: PROPHYLAXIS OF VENOUS THROMBOEMBOLISM  Start: 10/26/23 0900    0853-Given     2100                   ipratropium-albuterol (DUO-NEB) nebulizer solution 3 mL  Dose: 3 mL  Freq: 4 Times Daily - RT Route: NEBULIZATION  Start: 10/26/23 1300   Admin Instructions:   Include Respiratory Treatment Education    1234-Given     1900                   methylPREDNISolone sodium succinate (SOLU-Medrol) injection 40 mg  Dose: 40 mg  Freq: Every 12 Hours Route: IV  Start: 10/26/23 0800   Admin Instructions:   Caution: Look alike/sound alike drug alert    0853-Given     2000                   nicotine (NICODERM CQ) 14 MG/24HR patch 1 patch  Dose: 1 patch  Freq: Every 24 Hours Scheduled Route: TD  Start: 10/26/23 1000   Admin Instructions:   Apply to clean, dry, nonhairy area of skin (typically upper arm or shoulder)  Dispose of nicotine replacement therapies and their wrappers in non-hazardous pharmaceutical waste or in regular trash.    1007-Medication Applied                    sennosides-docusate (PERICOLACE) 8.6-50 MG per tablet 2 tablet  Dose: 2 tablet  Freq: 2 Times Daily Route: PO  Start: 10/26/23 0900   Admin Instructions:   HOLD MEDICATION IF PATIENT HAS HAD BOWEL MOVEMENT. Start bowel management regimen if patient has not had a bowel movement after 12 hours.    (0854)-Not Given     2100                  And  polyethylene glycol (MIRALAX) packet 17 g  Dose: 17 g  Freq: Daily PRN Route: PO  PRN Reason: Constipation  PRN Comment: Use if senna-docusate is ineffective  Start: 10/26/23  0516   Admin Instructions:   Use if no bowel movement after 12 hours. Mix in 6-8 ounces of water.  Use 4-8 ounces of water, tea, or juice for each 17 gram dose.       And  bisacodyl (DULCOLAX) EC tablet 5 mg  Dose: 5 mg  Freq: Daily PRN Route: PO  PRN Reason: Constipation  PRN Comment: Use if polyethylene glycol is ineffective  Start: 10/26/23 0516   Admin Instructions:   Use if no bowel movement after 12 hours.  Swallow whole. Do not crush, split, or chew tablet.       And  bisacodyl (DULCOLAX) suppository 10 mg  Dose: 10 mg  Freq: Daily PRN Route: RE  PRN Reason: Constipation  PRN Comment: Use if bisacodyl oral is ineffective  Start: 10/26/23 0516   Admin Instructions:   Use if no bowel movement after 12 hours.  Hold for diarrhea        sodium chloride 0.9 % flush 10 mL  Dose: 10 mL  Freq: Every 12 Hours Scheduled Route: IV  Start: 10/26/23 0900    0854-Given     2100                  Completed Medications  Medications 10/26/23       cefepime 2 gm IVPB in 100 ml NS (VTB)  Dose: 2,000 mg  Freq: Once Route: IV  Indications of Use: PNEUMONIA  Start: 10/26/23 0134   End: 10/26/23 0221    0151-New Bag     0221-Stopped                   iopamidol (ISOVUE-370) 76 % injection 100 mL  Dose: 100 mL  Freq: Once in Imaging Route: IV  Start: 10/26/23 0348   End: 10/26/23 0333    0333-Given                    ipratropium-albuterol (DUO-NEB) nebulizer solution 3 mL  Dose: 3 mL  Freq: Once Route: NEBULIZATION  Start: 10/26/23 0134   End: 10/26/23 0130   Admin Instructions:   Include Respiratory Treatment Education    0130-Given                    methylPREDNISolone sodium succinate (SOLU-Medrol) injection 125 mg  Dose: 125 mg  Freq: Once Route: IV  Start: 10/26/23 0134   End: 10/26/23 0151   Admin Instructions:   Caution: Look alike/sound alike drug alert    0151-Given                    sodium chloride 0.9 % bolus 1,389 mL  Dose: 30 mL/kg  Weight Dosing Info: 46.3 kg  Freq: Once Route: IV  Start: 10/26/23 0134   End: 10/26/23  0359   Admin Instructions:   You may need to adjust the volume of this order to account for fluids already given.  The total of the bolus(es) given should be as close to 30 mL/kg without going under.    0151-New Bag     0359-Stopped                   vancomycin (VANCOCIN) 1,000 mg in sodium chloride 0.9 % 250 mL IVPB-VTB  Dose: 20 mg/kg  Weight Dosing Info: 46.3 kg  Freq: Once Route: IV  Indications of Use: PNEUMONIA  Start: 10/26/23 0134   End: 10/26/23 0334    0234-New Bag     0334-Stopped                  Discontinued Medications  Medications 10/26/23       furosemide (LASIX) injection 80 mg  Dose: 80 mg  Freq: Once Route: IV  Start: 10/26/23 0328   End: 10/26/23 0313   Admin Instructions:   Hold for SBP less than 100, DBP less than 60        ipratropium-albuterol (DUO-NEB) nebulizer solution 3 mL  Dose: 3 mL  Freq: Every 6 Hours - RT Route: NEBULIZATION  Start: 10/26/23 0336   End: 10/26/23 0906   Admin Instructions:   Hold if HR>110 and use atrovent instead    0341-Given     0624-Given                   nicotine (NICODERM CQ) 7 MG/24HR patch 1 patch  Dose: 1 patch  Freq: Every 24 Hours Scheduled Route: TD  Start: 10/26/23 0500   End: 10/26/23 0906   Admin Instructions:   Apply to clean, dry, nonhairy area of skin (typically upper arm or shoulder)  Dispose of nicotine replacement therapies and their wrappers in non-hazardous pharmaceutical waste or in regular trash.    0419-Medication Applied     0909-Medication Removed [C]                             and   Medication Administration Report for Dorothy Cespedes as of 10/26/23 1341     Medications 10/26/23     Discontinued Medications    Pharmacy Consult - Pharmacy to dose  Freq: Continuous PRN Route: XX  PRN Reason: Consult  Start: 10/26/23 0320   End: 10/26/23 0323   Order specific questions:   Pharmacy to Dose: cefepime  Indication of Use pneumonia                     Lab Results (all)       Procedure Component Value Units Date/Time    Basic Metabolic Panel  [[id number]]  (Abnormal) Collected: 10/26/23 1019    Specimen: Blood Updated: 10/26/23 1122     Glucose 190 mg/dL      BUN 23 mg/dL      Creatinine 0.93 mg/dL      Sodium 138 mmol/L      Potassium 5.0 mmol/L      Chloride 103 mmol/L      CO2 26.3 mmol/L      Calcium 8.6 mg/dL      BUN/Creatinine Ratio 24.7     Anion Gap 8.7 mmol/L      eGFR 65.8 mL/min/1.73     Narrative:      GFR Normal >60  Chronic Kidney Disease <60  Kidney Failure <15    The GFR formula is only valid for adults with stable renal function between ages 18 and 70.    Legionella Antigen, Urine - Urine, Urine, Clean Catch [[id number]]  (Normal) Collected: 10/26/23 0855    Specimen: Urine, Clean Catch Updated: 10/26/23 1101     LEGIONELLA ANTIGEN, URINE Negative    Narrative:      Presumptive negative for L. pneumophilia serogroup 1 antigen, suggesting no recent or current infection.    S. Pneumo Ag Urine or CSF - Urine, Urine, Clean Catch [[id number]] Collected: 10/26/23 0855    Specimen: Urine, Clean Catch Updated: 10/26/23 0904    Blood Gas, Arterial With Co-Ox [[id number]]  (Abnormal) Collected: 10/26/23 0626    Specimen: Arterial Blood Updated: 10/26/23 0633     Site Right Radial     Giovany's Test Positive     pH, Arterial 7.321 pH units      Comment: 84 Value below reference range        pCO2, Arterial 50.1 mm Hg      Comment: 83 Value above reference range        pO2, Arterial 84.7 mm Hg      HCO3, Arterial 25.9 mmol/L      Base Excess, Arterial -0.7 mmol/L      O2 Saturation, Arterial 96.5 %      Hemoglobin, Blood Gas 11.1 g/dL      Comment: 84 Value below reference range        Hematocrit, Blood Gas 34.1 %      Comment: 84 Value below reference range        Oxyhemoglobin 94.4 %      Methemoglobin 0.20 %      Carboxyhemoglobin 2.0 %      A-a DO2 100.1 mmHg      CO2 Content 27.4 mmol/L      Barometric Pressure for Blood Gas 733 mmHg      Modality BiPap     FIO2 35 %      Ventilator Mode NA     Set Mech Resp Rate 22.0     IPAP 20     Comment: Meter:  [id number]     :  [id number]        [facility] 8     Collected by [id number]     pH, Temp Corrected --     pCO2, Temperature Corrected --     pO2, Temperature Corrected --    Respiratory Panel PCR w/COVID-19(SARS-CoV-2) SONDRA/ALFREDA/LEIF/PAD/COR/MAD/KERRY In-House, NP Swab in UTM/VTM, 3-4 HR TAT - Swab, Nasopharynx [[id number]]  (Normal) Collected: 10/26/23 0402    Specimen: Swab from Nasopharynx Updated: 10/26/23 0458     ADENOVIRUS, PCR Not Detected     Coronavirus 229E Not Detected     Coronavirus HKU1 Not Detected     Coronavirus NL63 Not Detected     Coronavirus OC43 Not Detected     COVID19 Not Detected     Human Metapneumovirus Not Detected     Human Rhinovirus/Enterovirus Not Detected     Influenza A PCR Not Detected     Influenza B PCR Not Detected     Parainfluenza Virus 1 Not Detected     Parainfluenza Virus 2 Not Detected     Parainfluenza Virus 3 Not Detected     Parainfluenza Virus 4 Not Detected     RSV, PCR Not Detected     Bordetella pertussis pcr Not Detected     Bordetella parapertussis PCR Not Detected     Chlamydophila pneumoniae PCR Not Detected     Mycoplasma pneumo by PCR Not Detected    Narrative:      In the setting of a positive respiratory panel with a viral infection PLUS a negative procalcitonin without other underlying concern for bacterial infection, consider observing off antibiotics or discontinuation of antibiotics and continue supportive care. If the respiratory panel is positive for atypical bacterial infection (Bordetella pertussis, Chlamydophila pneumoniae, or Mycoplasma pneumoniae), consider antibiotic de-escalation to target atypical bacterial infection.    High Sensitivity Troponin T 2Hr [[id number]]  (Abnormal) Collected: 10/26/23 0356    Specimen: Blood from Arm, Left Updated: 10/26/23 0429     HS Troponin T 21 ng/L      Troponin T Delta -13 ng/L     Narrative:      High Sensitive Troponin T Reference Range:  <10.0 ng/L- Negative Female for AMI  <15.0 ng/L- Negative Male for  AMI  >=10 - Abnormal Female indicating possible myocardial injury.  >=15 - Abnormal Male indicating possible myocardial injury.   Clinicians would have to utilize clinical acumen, EKG, Troponin, and serial changes to determine if it is an Acute Myocardial Infarction or myocardial injury due to an underlying chronic condition.         Blood Gas, Arterial With Co-Ox [[id number]]  (Abnormal) Collected: 10/26/23 0407    Specimen: Arterial Blood Updated: 10/26/23 0409     Site Left Brachial     Giovany's Test N/A     pH, Arterial 7.252 pH units      Comment: 84 Value below reference range        pCO2, Arterial 55.3 mm Hg      Comment: 83 Value above reference range        pO2, Arterial 54.2 mm Hg      Comment: 85 Value below critical limit        HCO3, Arterial 24.3 mmol/L      Base Excess, Arterial -3.4 mmol/L      O2 Saturation, Arterial 83.3 %      Comment: 84 Value below reference range        Hemoglobin, Blood Gas 11.4 g/dL      Comment: 84 Value below reference range        Hematocrit, Blood Gas 35.1 %      Comment: 84 Value below reference range        Oxyhemoglobin 81.6 %      Comment: 84 Value below reference range        Methemoglobin 0.20 %      Carboxyhemoglobin 1.9 %      A-a DO2 131.5 mmHg      CO2 Content 26.0 mmol/L      Barometric Pressure for Blood Gas 733 mmHg      Modality Nasal Cannula     FIO2 36 %      Flow Rate 4.0 lpm      Ventilator Mode NA     Notified Who VALERIA     Notified By [id number]     Notified Time 10/26/2023 04:09     Collected by [id number]     Comment: Meter: [id number]     :  [id number]        pH, Temp Corrected --     pCO2, Temperature Corrected --     pO2, Temperature Corrected --    MRSA Screen, PCR (Inpatient) - Swab, Nares [[id number]]  (Normal) Collected: 10/26/23 0149    Specimen: Swab from Nares Updated: 10/26/23 0305     MRSA PCR No MRSA Detected    Narrative:      The negative predictive value of this diagnostic test is high and should only be used to consider  de-escalating anti-MRSA therapy. A positive result may indicate colonization with MRSA and must be correlated clinically.    BNP [[id number]]  (Abnormal) Collected: 10/26/23 0145    Specimen: Blood from Arm, Right Updated: 10/26/23 0304     proBNP 2,082.0 pg/mL     Narrative:      This assay is used as an aid in the diagnosis of individuals suspected of having heart failure. It can be used as an aid in the diagnosis of acute decompensated heart failure (ADHF) in patients presenting with signs and symptoms of ADHF to the emergency department (ED). In addition, NT-proBNP of <300 pg/mL indicates ADHF is not likely.    Age Range Result Interpretation  NT-proBNP Concentration (pg/mL:      <50             Positive            >450                   Gray                 300-450                    Negative             <300    50-75           Positive            >900                  Gray                300-900                  Negative            <300      >75             Positive            >1800                  Gray                300-1800                  Negative            <300    CBC Auto Differential [[id number]]  (Normal) Collected: 10/26/23 0145    Specimen: Blood from Arm, Right Updated: 10/26/23 0251     WBC 6.97 10*3/mm3      RBC 4.20 10*6/mm3      Hemoglobin 12.0 g/dL      Hematocrit 38.1 %      MCV 90.7 fL      MCH 28.6 pg      MCHC 31.5 g/dL      RDW 15.2 %      RDW-SD 51.4 fl      MPV 10.9 fL      Platelets 223 10*3/mm3     Scan Slide [[id number]] Collected: 10/26/23 0145    Specimen: Blood from Arm, Right Updated: 10/26/23 0251     Scan Slide --     Comment: See Manual Differential Results       Manual Differential [[id number]]  (Abnormal) Collected: 10/26/23 0145    Specimen: Blood from Arm, Right Updated: 10/26/23 0251     Neutrophil % 51.0 %      Lymphocyte % 24.0 %      Monocyte % 14.0 %      Basophil % 1.0 %      Bands %  6.0 %      Metamyelocyte % 4.0 %      Neutrophils Absolute 3.97 10*3/mm3       "Lymphocytes Absolute 1.67 10*3/mm3      Monocytes Absolute 0.98 10*3/mm3      Basophils Absolute 0.07 10*3/mm3      RBC Morphology Normal     Platelet Morphology Normal    C-reactive Protein [[id number]]  (Abnormal) Collected: 10/26/23 0145    Specimen: Blood from Arm, Right Updated: 10/26/23 0231     C-Reactive Protein 42.39 mg/dL     Procalcitonin [[id number]]  (Abnormal) Collected: 10/26/23 0145    Specimen: Blood from Arm, Right Updated: 10/26/23 0225     Procalcitonin 7.30 ng/mL     Narrative:      As a Marker for Sepsis (Non-Neonates):    1. <0.5 ng/mL represents a low risk of severe sepsis and/or septic shock.  2. >2 ng/mL represents a high risk of severe sepsis and/or septic shock.    As a Marker for Lower Respiratory Tract Infections that require antibiotic therapy:    PCT on Admission    Antibiotic Therapy       6-12 Hrs later    >0.5                Strongly Recommended  >0.25 - <0.5        Recommended   0.1 - 0.25          Discouraged              Remeasure/reassess PCT  <0.1                Strongly Discouraged     Remeasure/reassess PCT    As 28 day mortality risk marker: \"Change in Procalcitonin Result\" (>80% or <=80%) if Day 0 (or Day 1) and Day 4 values are available. Refer to http://www.[facility]-pct-calculator.com    Change in PCT <=80%  A decrease of PCT levels below or equal to 80% defines a positive change in PCT test result representing a higher risk for 28-day all-cause mortality of patients diagnosed with severe sepsis for septic shock.    Change in PCT >80%  A decrease of PCT levels of more than 80% defines a negative change in PCT result representing a lower risk for 28-day all-cause mortality of patients diagnosed with severe sepsis or septic shock.       Comprehensive Metabolic Panel [[id number]]  (Abnormal) Collected: 10/26/23 0145    Specimen: Blood from Arm, Right Updated: 10/26/23 0220     Glucose 129 mg/dL      BUN 34 mg/dL      Creatinine 1.28 mg/dL      Sodium 132 mmol/L      Potassium " 5.1 mmol/L      Comment: Slight hemolysis detected by analyzer. Results may be affected.        Chloride 94 mmol/L      CO2 28.5 mmol/L      Calcium 9.0 mg/dL      Total Protein 6.5 g/dL      Albumin 3.4 g/dL      ALT (SGPT) 13 U/L      AST (SGOT) 22 U/L      Alkaline Phosphatase 79 U/L      Total Bilirubin 0.2 mg/dL      Globulin 3.1 gm/dL      A/G Ratio 1.1 g/dL      BUN/Creatinine Ratio 26.6     Anion Gap 9.5 mmol/L      eGFR 44.9 mL/min/1.73     Narrative:      GFR Normal >60  Chronic Kidney Disease <60  Kidney Failure <15    The GFR formula is only valid for adults with stable renal function between ages 18 and 70.    High Sensitivity Troponin T [[id number]]  (Abnormal) Collected: 10/26/23 0145    Specimen: Blood from Arm, Right Updated: 10/26/23 0218     HS Troponin T 34 ng/L     Narrative:      High Sensitive Troponin T Reference Range:  <10.0 ng/L- Negative Female for AMI  <15.0 ng/L- Negative Male for AMI  >=10 - Abnormal Female indicating possible myocardial injury.  >=15 - Abnormal Male indicating possible myocardial injury.   Clinicians would have to utilize clinical acumen, EKG, Troponin, and serial changes to determine if it is an Acute Myocardial Infarction or myocardial injury due to an underlying chronic condition.         Lactic Acid, Plasma [[id number]]  (Normal) Collected: 10/26/23 0145    Specimen: Blood from Arm, Left Updated: 10/26/23 0217     Lactate 1.2 mmol/L     COVID-19 and FLU A/B PCR - Swab, Nasopharynx [[id number]]  (Normal) Collected: 10/26/23 0149    Specimen: Swab from Nasopharynx Updated: 10/26/23 0214     COVID19 Not Detected     Influenza A PCR Not Detected     Influenza B PCR Not Detected    Narrative:      Fact sheet for providers: https://www.fda.gov/media/334706/download    Fact sheet for patients: https://www.fda.gov/media/579103/download    Test performed by PCR.    Blood Culture - Blood, Arm, Left [[id number]] Collected: 10/26/23 0145    Specimen: Blood from Arm, Left Updated:  10/26/23 0149    Blood Culture - Blood, Arm, Right [[id number]] Collected: 10/26/23 0145    Specimen: Blood from Arm, Right Updated: 10/26/23 0149    Blood Gas, Arterial With Co-Ox [[id number]]  (Abnormal) Collected: 10/25/23 2307    Specimen: Arterial Blood Updated: 10/25/23 2308     Site Left Brachial     Giovany's Test Positive     pH, Arterial 7.401 pH units      pCO2, Arterial 47.0 mm Hg      pO2, Arterial 55.2 mm Hg      Comment: 84 Value below reference range        HCO3, Arterial 29.2 mmol/L      Comment: 83 Value above reference range        Base Excess, Arterial 3.7 mmol/L      O2 Saturation, Arterial 89.0 %      Comment: 84 Value below reference range        Hemoglobin, Blood Gas 11.6 g/dL      Comment: 84 Value below reference range        Hematocrit, Blood Gas 35.6 %      Comment: 84 Value below reference range        Oxyhemoglobin 87.1 %      Comment: 84 Value below reference range        Methemoglobin <-0.10 %      Comment: 94 Value below reportable range < _0.1        Carboxyhemoglobin 2.5 %      A-a DO2 112.1 mmHg      CO2 Content 30.6 mmol/L      Barometric Pressure for Blood Gas 733 mmHg      Modality Nasal Cannula     FIO2 32 %      Flow Rate 3.0 lpm      Ventilator Mode NA     Collected by [id number]     Comment: Meter: [id number]     :  [id number]        pH, Temp Corrected --     pCO2, Temperature Corrected --     pO2, Temperature Corrected --          Imaging Results (All)       Procedure Component Value Units Date/Time    CT Angiogram Chest Pulmonary Embolism [[id number]] Collected: 10/26/23 0528     Updated: 10/26/23 0541    Narrative:      CLINICAL HISTORY: Cough, dyspnea, tachycardia.     COMPARISON: Low-dose chest CT on 5/22/2023.     TECHNIQUE: IV contrast was administered and helical images were acquired  through the chest.  Multiplanar reformats, including multiplanar MIPS,  were obtained.  Limited exposure control, adjustment of the mA and/or KV  according to patient size or use of  iterative reconstruction technique  was utilized.     FINDINGS:  No acute abnormality in the chest.     Pulmonary arteries: normal.  Negative for pulmonary embolism.     Heart and pericardium: Normal heart size.  Coronary atherosclerosis.   Pericardium is thin and normal appearing.     Aorta: normal.     Lungs: Extensive tree-in-bud opacities throughout the lungs, with more  focal areas of consolidation in the lingula and left lower lobe.     Airways: normal.     Pleura: normal.     Lymph nodes: Enlarged mediastinal nodes measure up to 1 cm in short  axis, likely reactive.     Musculoskeletal: no acute abnormality.     Esophagus: normal.     Upper abdomen: normal.     Other: n/a       Impression:         NEGATIVE FOR PULMONARY EMBOLISM.     DIFFUSE BRONCHIOLITIS WITH MORE FOCAL CONSOLIDATION IN THE LINGULA AND  LEFT LOWER LOBE.  PROBABLY DUE TO INFECTION.  CONSIDER TUBERCULOSIS IN  ADDITION TO OTHER BACTERIAL ETIOLOGIES.                    This report was finalized on 10/26/2023 5:39 AM by Jada Torres MD.       XR Chest 1 View [[id number]] Collected: 10/26/23 0006     Updated: 10/26/23 0010    Narrative:      PROCEDURE: Portable chest x-ray examination performed on October 25, 2023. Single view. Upright position.     HISTORY: Hypertension.     FINDINGS:     Heart size within normal limits.  Coarsened bronchovascular pattern to the lungs with features of  underlying chronic interstitial lung disease.  Possible contribution from mild interstitial edema.  No pleural effusion or pneumothorax.  Mild hypoinflated lungs.  No free air in the upper abdomen.       Impression:         1.  Coarsened bronchovascular pattern to the lungs with features of  underlying chronic interstitial lung disease.  2.  Possible mild interstitial edema.  3.  Mild hypoinflated lungs.  4.  No pleural effusion or pneumothorax.  5.  No fracture or foreign body.     This report was finalized on 10/26/2023 12:08 AM by Dimas Judd MD.              Orders (all)        Start     Ordered    10/26/23 1203  Inpatient COPD Education (EDU) Consult  Once        Provider:  (Not yet assigned)   See Hyperspace for full Linked Orders Report.    10/26/23 1202    10/26/23 1203  Inpatient COPD Education (RT) Consult  Once        Provider:  (Not yet assigned)   See Hyperspace for full Linked Orders Report.    10/26/23 1202    10/26/23 0947  Elevate Heels Off of Bed  Until Discontinued         10/26/23 0947    10/26/23 0946  Turn Patient  Now Then Every 2 Hours         10/26/23 0947    10/26/23 0946  Use Repositioning Wedge to Position Patient  Continuous         10/26/23 0947    10/26/23 0907  Oscillating Positive Expiratory Pressure (OPEP)  Once         10/26/23 0906    10/26/23 0801  PT Consult: Eval & Treat Functional Mobility Below Baseline  Once         10/26/23 0800    10/26/23 0800  Vital Signs  Every 8 Hours        Comments: Per per hospital policy    10/26/23 0516    10/26/23 0800  Oral Care  2 Times Daily       10/26/23 0516    10/26/23 0702  Stage I Pressure Ulcer Care  Continuous        Comments: Position Patient Off Area With Stage I Pressure Ulcer    10/26/23 0701    10/26/23 0702  Follow Pressure Ulcer Prevention Measures Policy  Continuous        Comments: Implement Appropriate Pressure Ulcer Prevention Measures  - Open Order Report to View Full Instructions  Enter Wound LDA & Document Assessment  Add Wound Care Plan  Add Patient Education Per Policy    10/26/23 0701    10/26/23 0702  Wound Ostomy Eval & Treat  Once         10/26/23 0701    10/26/23 0634  Blood Gas, Arterial With Co-Ox  PROCEDURE ONCE         10/26/23 0626    10/26/23 0630  Blood Gas, Arterial -With Co-Ox Panel: Yes  Timed         10/26/23 0420    10/26/23 0600  Strict Intake & Output  Every 6 Hours         10/26/23 0516    10/26/23 0517  Continuous Cardiac Monitoring  Continuous        Comments: Follow Standing Orders As Outlined in Process Instructions (Open Order Report to View Full  Instructions)    10/26/23 0516    10/26/23 0517  Telemetry - Maintain IV Access  Continuous         10/26/23 0516    10/26/23 0517  Telemetry - Place Orders & Notify Provider of Results When Patient Experiences Acute Chest Pain, Dysrhythmia or Respiratory Distress  Until Discontinued         10/26/23 0516    10/26/23 0517  May Be Off Telemetry for Tests  Continuous         10/26/23 0516    10/26/23 0517  Pulse Oximetry, Continuous  Continuous         10/26/23 0516    10/26/23 0517  Daily Weights  Daily       10/26/23 0516    10/26/23 0517  Diet: Regular/House Diet; Texture: Regular Texture (IDDSI 7); Fluid Consistency: Thin (IDDSI 0)  Diet Effective Now         10/26/23 0516    10/26/23 0323  Inpatient Admission  Once         10/26/23 0324    10/26/23 0323  Code Status and Medical Interventions:  Continuous         10/26/23 0324                  Ventilator/Non-Invasive Ventilation Settings (From admission, onward)       Start     Ordered    10/26/23 0420  NIPPV-IPPV / BIPAP / CPAP  At Bedtime & As Needed-RT        Comments: FiO2 35%   Question Answer Comment   Indication Acute Respiratory Failure    Type BIPAP    IPAP 20    EPAP 8    Breath Rate 22    Titrate Oxygen for SpO2 90% or Greater        10/26/23 0420

## 2023-10-26 NOTE — PROGRESS NOTES
Patient seen and examined during am rounds. H&P from same day reviewed as well as admission labwork and imaging. Agree with plan as previously documented with below updates:     HR improving with improved O2. Added mucinex, opep, ebony duonebs given patient with heavy, course rales, left lung most severe. O2 requirement however noted to be near baseline and wheezing improved, patient subjectively improved from admission.

## 2023-10-26 NOTE — ED NOTES
MEDICAL SCREENING:    Reason for Visit: SOA    Patient initially seen in triage.  The patient was advised further evaluation and diagnostic testing will be needed, some of the treatment and testing will be initiated in the lobby in order to begin the process.  The patient will be returned to the waiting area for the time being and possibly be re-assessed by a subsequent ED provider.  The patient will be brought back to the treatment area in as timely manner as possible.     Ana Murcia, APRN  10/25/23 3047

## 2023-10-27 LAB
ALBUMIN SERPL-MCNC: 3 G/DL (ref 3.5–5.2)
ALBUMIN/GLOB SERPL: 0.9 G/DL
ALP SERPL-CCNC: 82 U/L (ref 39–117)
ALT SERPL W P-5'-P-CCNC: 17 U/L (ref 1–33)
ANION GAP SERPL CALCULATED.3IONS-SCNC: 9.5 MMOL/L (ref 5–15)
AST SERPL-CCNC: 24 U/L (ref 1–32)
BILIRUB SERPL-MCNC: <0.2 MG/DL (ref 0–1.2)
BUN SERPL-MCNC: 26 MG/DL (ref 8–23)
BUN/CREAT SERPL: 32.9 (ref 7–25)
CALCIUM SPEC-SCNC: 9.2 MG/DL (ref 8.6–10.5)
CHLORIDE SERPL-SCNC: 104 MMOL/L (ref 98–107)
CO2 SERPL-SCNC: 26.5 MMOL/L (ref 22–29)
CREAT SERPL-MCNC: 0.79 MG/DL (ref 0.57–1)
CRP SERPL-MCNC: 32.26 MG/DL (ref 0–0.5)
DEPRECATED RDW RBC AUTO: 54.1 FL (ref 37–54)
EGFRCR SERPLBLD CKD-EPI 2021: 80.1 ML/MIN/1.73
ERYTHROCYTE [DISTWIDTH] IN BLOOD BY AUTOMATED COUNT: 15.8 % (ref 12.3–15.4)
GLOBULIN UR ELPH-MCNC: 3.2 GM/DL
GLUCOSE SERPL-MCNC: 139 MG/DL (ref 65–99)
HCT VFR BLD AUTO: 34.7 % (ref 34–46.6)
HGB BLD-MCNC: 10.4 G/DL (ref 12–15.9)
HYPOCHROMIA BLD QL: ABNORMAL
LYMPHOCYTES # BLD MANUAL: 1.14 10*3/MM3 (ref 0.7–3.1)
LYMPHOCYTES NFR BLD MANUAL: 4 % (ref 5–12)
MCH RBC QN AUTO: 28.1 PG (ref 26.6–33)
MCHC RBC AUTO-ENTMCNC: 30 G/DL (ref 31.5–35.7)
MCV RBC AUTO: 93.8 FL (ref 79–97)
METAMYELOCYTES NFR BLD MANUAL: 7 % (ref 0–0)
MONOCYTES # BLD: 0.25 10*3/MM3 (ref 0.1–0.9)
NEUTROPHILS # BLD AUTO: 4.48 10*3/MM3 (ref 1.7–7)
NEUTROPHILS NFR BLD MANUAL: 67 % (ref 42.7–76)
NEUTS BAND NFR BLD MANUAL: 4 % (ref 0–5)
PLAT MORPH BLD: NORMAL
PLATELET # BLD AUTO: 219 10*3/MM3 (ref 140–450)
PMV BLD AUTO: 11.1 FL (ref 6–12)
POTASSIUM SERPL-SCNC: 4.5 MMOL/L (ref 3.5–5.2)
PROT SERPL-MCNC: 6.2 G/DL (ref 6–8.5)
RBC # BLD AUTO: 3.7 10*6/MM3 (ref 3.77–5.28)
SODIUM SERPL-SCNC: 140 MMOL/L (ref 136–145)
VARIANT LYMPHS NFR BLD MANUAL: 18 % (ref 19.6–45.3)
WBC NRBC COR # BLD: 6.31 10*3/MM3 (ref 3.4–10.8)

## 2023-10-27 PROCEDURE — 87070 CULTURE OTHR SPECIMN AEROBIC: CPT | Performed by: HOSPITALIST

## 2023-10-27 PROCEDURE — 80053 COMPREHEN METABOLIC PANEL: CPT | Performed by: HOSPITALIST

## 2023-10-27 PROCEDURE — 25010000002 AZITHROMYCIN PER 500 MG: Performed by: HOSPITALIST

## 2023-10-27 PROCEDURE — 99232 SBSQ HOSP IP/OBS MODERATE 35: CPT | Performed by: STUDENT IN AN ORGANIZED HEALTH CARE EDUCATION/TRAINING PROGRAM

## 2023-10-27 PROCEDURE — 87205 SMEAR GRAM STAIN: CPT | Performed by: HOSPITALIST

## 2023-10-27 PROCEDURE — 85007 BL SMEAR W/DIFF WBC COUNT: CPT | Performed by: HOSPITALIST

## 2023-10-27 PROCEDURE — 94799 UNLISTED PULMONARY SVC/PX: CPT

## 2023-10-27 PROCEDURE — 94664 DEMO&/EVAL PT USE INHALER: CPT

## 2023-10-27 PROCEDURE — 85025 COMPLETE CBC W/AUTO DIFF WBC: CPT | Performed by: HOSPITALIST

## 2023-10-27 PROCEDURE — 25010000002 HEPARIN (PORCINE) PER 1000 UNITS: Performed by: HOSPITALIST

## 2023-10-27 PROCEDURE — 86140 C-REACTIVE PROTEIN: CPT | Performed by: HOSPITALIST

## 2023-10-27 PROCEDURE — 25810000003 SODIUM CHLORIDE 0.9 % SOLUTION 250 ML FLEX CONT: Performed by: HOSPITALIST

## 2023-10-27 PROCEDURE — 25010000002 METHYLPREDNISOLONE PER 40 MG: Performed by: HOSPITALIST

## 2023-10-27 PROCEDURE — 94660 CPAP INITIATION&MGMT: CPT

## 2023-10-27 PROCEDURE — 25010000002 CEFEPIME PER 500 MG: Performed by: HOSPITALIST

## 2023-10-27 PROCEDURE — 94761 N-INVAS EAR/PLS OXIMETRY MLT: CPT

## 2023-10-27 RX ORDER — GABAPENTIN 300 MG/1
300 CAPSULE ORAL EVERY 8 HOURS SCHEDULED
Status: DISCONTINUED | OUTPATIENT
Start: 2023-10-27 | End: 2023-10-29 | Stop reason: HOSPADM

## 2023-10-27 RX ORDER — PANTOPRAZOLE SODIUM 40 MG/1
40 TABLET, DELAYED RELEASE ORAL DAILY PRN
Status: DISCONTINUED | OUTPATIENT
Start: 2023-10-27 | End: 2023-10-29 | Stop reason: HOSPADM

## 2023-10-27 RX ORDER — OXYBUTYNIN CHLORIDE 5 MG/1
5 TABLET ORAL 2 TIMES DAILY
Status: DISCONTINUED | OUTPATIENT
Start: 2023-10-27 | End: 2023-10-29 | Stop reason: HOSPADM

## 2023-10-27 RX ORDER — ALPRAZOLAM 1 MG/1
1 TABLET ORAL 2 TIMES DAILY
Status: DISCONTINUED | OUTPATIENT
Start: 2023-10-27 | End: 2023-10-29 | Stop reason: HOSPADM

## 2023-10-27 RX ORDER — ASPIRIN 81 MG/1
81 TABLET ORAL DAILY
Status: DISCONTINUED | OUTPATIENT
Start: 2023-10-27 | End: 2023-10-29 | Stop reason: HOSPADM

## 2023-10-27 RX ORDER — ATORVASTATIN CALCIUM 20 MG/1
20 TABLET, FILM COATED ORAL DAILY
Status: DISCONTINUED | OUTPATIENT
Start: 2023-10-27 | End: 2023-10-29 | Stop reason: HOSPADM

## 2023-10-27 RX ORDER — BUDESONIDE AND FORMOTEROL FUMARATE DIHYDRATE 160; 4.5 UG/1; UG/1
2 AEROSOL RESPIRATORY (INHALATION)
Status: DISCONTINUED | OUTPATIENT
Start: 2023-10-27 | End: 2023-10-29 | Stop reason: HOSPADM

## 2023-10-27 RX ORDER — BENZONATATE 100 MG/1
100 CAPSULE ORAL 3 TIMES DAILY PRN
Status: DISCONTINUED | OUTPATIENT
Start: 2023-10-27 | End: 2023-10-29 | Stop reason: HOSPADM

## 2023-10-27 RX ORDER — CODEINE PHOSPHATE AND GUAIFENESIN 10; 100 MG/5ML; MG/5ML
5 SOLUTION ORAL EVERY 6 HOURS PRN
Status: DISCONTINUED | OUTPATIENT
Start: 2023-10-27 | End: 2023-10-29 | Stop reason: HOSPADM

## 2023-10-27 RX ADMIN — OXYBUTYNIN CHLORIDE 5 MG: 5 TABLET ORAL at 23:19

## 2023-10-27 RX ADMIN — OXYBUTYNIN CHLORIDE 5 MG: 5 TABLET ORAL at 09:07

## 2023-10-27 RX ADMIN — CEFEPIME 2000 MG: 2 INJECTION, POWDER, FOR SOLUTION INTRAVENOUS at 03:23

## 2023-10-27 RX ADMIN — IPRATROPIUM BROMIDE AND ALBUTEROL SULFATE 3 ML: .5; 2.5 SOLUTION RESPIRATORY (INHALATION) at 18:59

## 2023-10-27 RX ADMIN — CEFEPIME 2000 MG: 2 INJECTION, POWDER, FOR SOLUTION INTRAVENOUS at 14:12

## 2023-10-27 RX ADMIN — HYDROCODONE BITARTRATE AND ACETAMINOPHEN 1 TABLET: 10; 325 TABLET ORAL at 08:35

## 2023-10-27 RX ADMIN — ALPRAZOLAM 1 MG: 1 TABLET ORAL at 08:35

## 2023-10-27 RX ADMIN — BENZONATATE 100 MG: 100 CAPSULE ORAL at 15:20

## 2023-10-27 RX ADMIN — BUDESONIDE AND FORMOTEROL FUMARATE DIHYDRATE 2 PUFF: 160; 4.5 AEROSOL RESPIRATORY (INHALATION) at 13:00

## 2023-10-27 RX ADMIN — ALPRAZOLAM 1 MG: 1 TABLET ORAL at 21:48

## 2023-10-27 RX ADMIN — IPRATROPIUM BROMIDE AND ALBUTEROL SULFATE 3 ML: .5; 2.5 SOLUTION RESPIRATORY (INHALATION) at 13:01

## 2023-10-27 RX ADMIN — NICOTINE 1 PATCH: 14 PATCH, EXTENDED RELEASE TRANSDERMAL at 08:36

## 2023-10-27 RX ADMIN — Medication 10 ML: at 21:48

## 2023-10-27 RX ADMIN — GABAPENTIN 300 MG: 300 CAPSULE ORAL at 21:48

## 2023-10-27 RX ADMIN — HEPARIN SODIUM 5000 UNITS: 5000 INJECTION INTRAVENOUS; SUBCUTANEOUS at 08:35

## 2023-10-27 RX ADMIN — GABAPENTIN 300 MG: 300 CAPSULE ORAL at 14:13

## 2023-10-27 RX ADMIN — IPRATROPIUM BROMIDE AND ALBUTEROL SULFATE 3 ML: .5; 2.5 SOLUTION RESPIRATORY (INHALATION) at 06:50

## 2023-10-27 RX ADMIN — AZITHROMYCIN DIHYDRATE 500 MG: 500 INJECTION, POWDER, LYOPHILIZED, FOR SOLUTION INTRAVENOUS at 05:54

## 2023-10-27 RX ADMIN — IPRATROPIUM BROMIDE AND ALBUTEROL SULFATE 3 ML: .5; 2.5 SOLUTION RESPIRATORY (INHALATION) at 00:47

## 2023-10-27 RX ADMIN — DOCUSATE SODIUM 50 MG AND SENNOSIDES 8.6 MG 2 TABLET: 8.6; 5 TABLET, FILM COATED ORAL at 08:35

## 2023-10-27 RX ADMIN — Medication 10 ML: at 08:36

## 2023-10-27 RX ADMIN — HEPARIN SODIUM 5000 UNITS: 5000 INJECTION INTRAVENOUS; SUBCUTANEOUS at 21:48

## 2023-10-27 RX ADMIN — METHYLPREDNISOLONE SODIUM SUCCINATE 40 MG: 40 INJECTION, POWDER, FOR SOLUTION INTRAMUSCULAR; INTRAVENOUS at 19:46

## 2023-10-27 RX ADMIN — ASPIRIN 81 MG: 81 TABLET, COATED ORAL at 09:07

## 2023-10-27 RX ADMIN — CASTOR OIL AND BALSAM, PERU 1 APPLICATION: 788; 87 OINTMENT TOPICAL at 21:48

## 2023-10-27 RX ADMIN — GUAIFENESIN AND CODEINE PHOSPHATE 5 ML: 10; 100 LIQUID ORAL at 18:19

## 2023-10-27 RX ADMIN — CASTOR OIL AND BALSAM, PERU 1 APPLICATION: 788; 87 OINTMENT TOPICAL at 08:36

## 2023-10-27 RX ADMIN — GUAIFENESIN 600 MG: 600 TABLET, EXTENDED RELEASE ORAL at 08:35

## 2023-10-27 RX ADMIN — ATORVASTATIN CALCIUM 20 MG: 20 TABLET, FILM COATED ORAL at 09:07

## 2023-10-27 RX ADMIN — METHYLPREDNISOLONE SODIUM SUCCINATE 40 MG: 40 INJECTION, POWDER, FOR SOLUTION INTRAMUSCULAR; INTRAVENOUS at 08:35

## 2023-10-27 NOTE — PLAN OF CARE
Goal Outcome Evaluation:            Patient alert and oriented x 4, patient wore BIPAP for several hours tonight--see flowsheet data; patient NSR on the monitor, patient 3L NC when off bipap and tolerating well, bed alarm on, call light within reach, and VSS at this time.

## 2023-10-27 NOTE — CONSULTS
COPD Education        Referring Provider: Lorene Henning, RN, Case Management  Dr. Mosher  Reason for Consultation: COPD Education  Pulmonologist: Dr. Dodson  Last outpatient pulmonary visit: 7/13/23  Length of Diagnosis: 16 years per patient  Last Hospital stay for COPD? 10/1/23-10/2/23    Subjective .     Age: 72 y.o.  Sex: female  What stage have you been told you are in? Stage II  Do you use a CPAP or BIPAP? no   Have you had any recent weight loss? no  How many pillows do you use? 3    Last PFT/when/where? 6/20/23, Dr. Dodson's office  FEV1: 60%    Classification of Airflow Limitation Severity in COPD (Based on Post-Bronchodilator FEV1)  Gold 1: Mild FEV1 ? 80% predicted   Gold 2:  Moderate 50% ? FEV1 < 80% predicted   Gold 3: Severe 30% ? FEV1 < 50% predicted   Gold 4: Very Severe FEV1 < 30% predicted     FEV1/FVC: 50%    Do you have dyspnea? yes Dyspnea on exertion  Home O2: 3.5L NC    Social History:  Smoking History: 45 pack years      Smoking Cessation: No    Airway Clearance methods utilized: no    History of Sleep Apnea: no  Patient's Last ABG:  Site   Date Value Ref Range Status   10/26/2023 Right Radial  Final     Giovany's Test   Date Value Ref Range Status   10/26/2023 Positive  Final     pH, Arterial   Date Value Ref Range Status   10/26/2023 7.321 (L) 7.350 - 7.450 pH units Final     Comment:     84 Value below reference range     pCO2, Arterial   Date Value Ref Range Status   10/26/2023 50.1 (H) 35.0 - 45.0 mm Hg Final     Comment:     83 Value above reference range     pO2, Arterial   Date Value Ref Range Status   10/26/2023 84.7 83.0 - 108.0 mm Hg Final     HCO3, Arterial   Date Value Ref Range Status   10/26/2023 25.9 20.0 - 26.0 mmol/L Final     Base Excess, Arterial   Date Value Ref Range Status   10/26/2023 -0.7 (L) 0.0 - 2.0 mmol/L Final     O2 Saturation, Arterial   Date Value Ref Range Status   10/26/2023 96.5 94.0 - 99.0 % Final     Hemoglobin, Blood Gas   Date Value Ref Range  Status   10/26/2023 11.1 (L) 13.5 - 17.5 g/dL Final     Comment:     84 Value below reference range     Hematocrit, Blood Gas   Date Value Ref Range Status   10/26/2023 34.1 (L) 38.0 - 51.0 % Final     Comment:     84 Value below reference range     Oxyhemoglobin   Date Value Ref Range Status   10/26/2023 94.4 94 - 99 % Final     Methemoglobin   Date Value Ref Range Status   10/26/2023 0.20 0.00 - 3.00 % Final     Carboxyhemoglobin   Date Value Ref Range Status   10/26/2023 2.0 0 - 5 % Final     CO2 Content   Date Value Ref Range Status   10/26/2023 27.4 22 - 33 mmol/L Final     Barometric Pressure for Blood Gas   Date Value Ref Range Status   10/26/2023 733 mmHg Final     Modality   Date Value Ref Range Status   10/26/2023 BiPap  Final     FIO2   Date Value Ref Range Status   10/26/2023 35 % Final        Objective     SpO2 SpO2: 96 % (10/27/23 1000)  Device Device (Oxygen Therapy): nasal cannula (10/27/23 0830)  Flow Flow (L/min): 3 (10/27/23 0830)  Home Equipment Used: O2 Provided by: [facility]  Breath Sounds: diminished breath sounds- throughout  CAT Assessment: (COPD Assessment Test)   I never cough. 0[x] 1[] 2[] 3[] 4[] 5[] I cough all the time.  I have no phlegm (mucus) in my chest. 0[] 1[] 2[] 3[] 4[] 5[x] My chest is completely full of phlegm (mucus).  My chest does not feel tight at all. 0[] 1[] 2[] 3[] 4[] 5[x] My chest feels very tight.  When I walk up a hill or one flight of stairs I am not breathless. 0[] 1[] 2[] 3[] 4[] 5[x] When I walk up a hill or one flight of stairs I am very breathless.  I am not limited doing any activities at home 0[] 1[] 2[] 3[] 4[] 5[x] I am very limited doing activities at home.  I am confident leaving my home despite my lung condition. 0[] 1[] 2[] 3[] 4[] 5[] I am not at all confident leaving my home because of my lung condition.  I sleep soundly. 0[] 1[] 2[] 3[] 4[] 5[x] I don't sleep soundly because of my lung condition.  I have lots of energy. 0[] 1[] 2[] 3[] 4[x] 5[]  I  have no energy at all.  CAT Score(COPD Assessment Test): 34  Score  Impact Guidance    0-9 Low If smoke, encourage to stop smoking  Flu, Pneumonia, and Covid need to be up to date  Avoid COPD Triggers    10-20 Medium Encouraged all guidance for low impact score  Consider additional medications    21-40 High Encouraged all medium impact scores  Recommend referral to pulmonologist      STOP BANG Screening Questionnaire:   Snoring?   Do you snore loudly (loud enough to be heard through closed doors or that your bed partner elbows you for snoring at night)? {no     Tired?   Do you often feel tired, fatigued, or sleepy during the daytime (such as falling asleep during driving or talking to someone)? yes               Observed?   Has anyone observed you stop breathing or choking/gasping during your sleep? no             Pressure?   Do you have or are you being treated for high blood pressure? yes             Body mass index (BMI) more than 35 kg/m2?  Body mass index is 18.95 kg/m². no    Age older than 50? yes       Neck size large (measured around Maurice's apple)? Is your shirt collar 16 inches (40 cm) or larger? no  Gender (biologic sex): male? no    STOP BANG Score Total: 3    STOP BANG Interpretation    Risk category Risk factors   Low risk Yes to 0 to 2 of the questions   Intermediate risk Yes to 3 to 4 questions   High risk Yes to 5 to 8 questions   High risk Yes to 2 or more of 4 STOP questions and BMI >35 kg/m2   High risk Yes to 2 or more of 4 STOP questions and neck circumference ?16 inches (?40 cm)   High risk Yes to 2 or more of 4 STOP questions and male gender (biologic sex)              Home Medications:  Medications Prior to Admission   Medication Sig Dispense Refill Last Dose    albuterol (PROVENTIL) (2.5 MG/3ML) 0.083% nebulizer solution Take 2.5 mg by nebulization Every 6 (Six) Hours As Needed for Wheezing.   Unknown    albuterol sulfate  (90 Base) MCG/ACT inhaler Inhale 2 puffs Every 6 (Six) Hours  "As Needed for Wheezing.   Unknown    ALPRAZolam (XANAX) 1 MG tablet Take 1 tablet by mouth 2 (Two) Times a Day.   Unknown    aspirin 81 MG EC tablet Take 1 tablet by mouth Daily.   Unknown    atorvastatin (LIPITOR) 20 MG tablet Take 1 tablet by mouth Daily.   Unknown    gabapentin (NEURONTIN) 800 MG tablet Take 1 tablet by mouth 3 (Three) Times a Day As Needed (nerve pain).   Unknown    HYDROcodone-acetaminophen (NORCO)  MG per tablet Take 1 tablet by mouth Every 6 (Six) Hours As Needed for Moderate Pain.   Unknown    lisinopril (PRINIVIL,ZESTRIL) 10 MG tablet Take 1 tablet by mouth Daily.   Unknown    meloxicam (MOBIC) 7.5 MG tablet Take 1 tablet by mouth Daily.   Unknown    omeprazole (priLOSEC) 20 MG capsule Take 1 capsule by mouth 2 (Two) Times a Day As Needed (Stomach).   Unknown    oxybutynin (DITROPAN) 5 MG tablet Take 1 tablet by mouth 2 (Two) Times a Day.   Unknown    Umeclidinium-Vilanterol (Anoro Ellipta) 62.5-25 MCG/ACT aerosol powder  inhaler Inhale 1 puff Daily.   Unknown     Barriers to Learning? No    Discussion: COPD education was given to Ms. Cespedes via the booklet , \"A Patient's Guide to COPD\". Discussion of the COPD zones (Green/Yellow/Red) was competed with emphasizes on what to do in the yellow and red zones. She was in her room and pleasantly interested in COPD education.      Proper Inhaler technique was illustrated with and without the given Aero Chamber spacer I provided to the patient. Instruction on rescue and maintenance medications, the function of each and the importance of using them as prescribed.      Pursed Lip breathing was instructed as well as when to utilize the breathing technique.      Ms. Cespedes is acquainted with Pulmonology in [facility], Dr. Dodson. Her next appointment is 11/16/23. I reminded her of her appointment and encouraged her to keep it. She has Anoro for COPD Maintenance along with Albuterol via nebulizer and MDI for rescue. She states she uses her " Anoro daily.      I discussed the COPD Clinic with Ms. Cespedes. She showed interest but for now elected to not participate. I did give her contact information and instructed her to call the clinic anytime if she changed her mind and desired to make an appointment.      During this hospital admission, she currently does not have any COPD Maintenance meds ordered. I placed a order for Symbicort. I enforced the use of the Spacer I provided her today and instructed her to use it with Symbicort while she is admitted.      She stated she needed some assistance at home getting Nicotine patches. I did let Dr. Mosher know.     JOHN Arreola, RRT  COPD Navigator  10/27/23  12:06 EDT

## 2023-10-27 NOTE — CASE MANAGEMENT/SOCIAL WORK
Discharge Planning Assessment   Ke     Patient Name: Dorothy Cespedes  MRN: [id number]  Today's Date: 10/27/2023    Admit Date: 10/26/2023     Discharge Plan       Row Name 10/27/23 1442       Plan    Plan CM completed initial consult. SS received CM consult for Discharge Planning, Son request Home Health, Basic is completed. SS spoke with pt at bedside on this date. Pt lives at home alone and states she has a grand-daughter that stays with her. Pt PCP is Johann Juárez. Pt does not utilize Home Health however son has requested.home health at discharge. Pt utilizes Continuous O2 @ 2L, Cane & Nebulizer that she obtained from [facility]. Pt son will provide transportation at discharge. Son is supposed to make other arrangements for pt because he states pt can not live alone anymore. Pt has declined SNF placement and wants to return home at d/c with home health services. SS attempted to contact pt landon Jones without success. SS left voicemail to return call. Pt and son is requesting hospital bed and walker at discharge. SS to follow and assist with discharge planning.    16:17: SS contacted pt son Robert regarding other 24/7 care. Pt son states that he was able to obtain 24/7 care for pt so pt is not alone at home. SS did provided pt son with comfort keepers and home helpers phone numbers incase services would be needed at a later time. SS to follow.                Ana Schneider, JOHNATHANW

## 2023-10-27 NOTE — PLAN OF CARE
Goal Outcome Evaluation:   VSS. AxOx4. 3 L NC, tolerating well. Up to chair this shift, ambulated in room with assist. PRN meds utilized for cough. Med/surg status. Call bell within reach, bed alarm set. Care ongoing.

## 2023-10-27 NOTE — PROGRESS NOTES
[facility] HOSPITALIST PROGRESS NOTE     Patient Identification:  Name:  Dorothy Cespedes  Age:  72 y.o.  Sex:  female  :  1950  MRN:  [id number]  Visit Number:  [id number]  ROOM: [place] [address]     Primary Care Provider:  Johann Juárez MD     Date of Admission: 10/26/2023    Length of stay in inpatient status:  1    Subjective     Chief Compliant:    Chief Complaint   Patient presents with    Fall    Syncope    Shortness of Breath       Patient with improved WOB back to essentially baseline this am but having ongoing significant course coughing episodes that remain productive. No fever, O2 requirement stable.        Objective     Current Hospital Meds:  ALPRAZolam, 1 mg, Oral, BID  aspirin, 81 mg, Oral, Daily  atorvastatin, 20 mg, Oral, Daily  azithromycin, 500 mg, Intravenous, Q24H  budesonide-formoterol, 2 puff, Inhalation, BID - RT  castor oil-balsam peru, 1 application , Topical, Q12H  cefepime, 2,000 mg, Intravenous, Q12H  gabapentin, 300 mg, Oral, Q8H  heparin (porcine), 5,000 Units, Subcutaneous, Q12H  ipratropium-albuterol, 3 mL, Nebulization, 4x Daily - RT  methylPREDNISolone sodium succinate, 40 mg, Intravenous, Q12H  nicotine, 1 patch, Transdermal, Q24H  oxybutynin, 5 mg, Oral, BID  senna-docusate sodium, 2 tablet, Oral, BID  sodium chloride, 10 mL, Intravenous, Q12H       Current Antimicrobial Therapy:  Anti-Infectives (From admission, onward)      Ordered     Dose/Rate Route Frequency Start Stop    10/26/23 0323  cefepime 2 gm IVPB in 100 ml NS (VTB)        Ordering Provider: Hector Anaya MD    2,000 mg  over 4 Hours Intravenous Every 12 Hours 10/26/23 1400 10/31/23 1359    10/26/23 0358  azithromycin (ZITHROMAX) 500 mg in sodium chloride 0.9 % 250 mL IVPB-VTB        Ordering Provider: Hector Anaya MD    500 mg  over 60 Minutes Intravenous Every 24 Hours 10/26/23 0500 10/29/23 0459    10/26/23 0118  cefepime 2 gm IVPB in 100 ml NS (VTB)        Ordering  Provider: Amos Vinson DO    2,000 mg  over 30 Minutes Intravenous Once 10/26/23 0134 10/26/23 0221    10/26/23 0118  vancomycin (VANCOCIN) 1,000 mg in sodium chloride 0.9 % 250 mL IVPB-VTB        Ordering Provider: Amos Vinson DO    20 mg/kg × 46.3 kg  250 mL/hr over 60 Minutes Intravenous Once 10/26/23 0134 10/26/23 0334          Current Diuretic Therapy:  Diuretics (From admission, onward)      None          ----------------------------------------------------------------------------------------------------------------------  Vital Signs:  Temp:  [97.6 °F (36.4 °C)-98.1 °F (36.7 °C)] 98 °F (36.7 °C)  Heart Rate:  [] 101  Resp:  [16-32] 26  BP: (121-179)/() 149/91  SpO2:  [89 %-100 %] 93 %  on  Flow (L/min):  [3] 3;   Device (Oxygen Therapy): nasal cannula  Body mass index is 18.95 kg/m².    Wt Readings from Last 3 Encounters:   10/27/23 45.5 kg (100 lb 5 oz)   10/02/23 47.6 kg (105 lb)   10/06/22 44 kg (97 lb)     Intake & Output (last 3 days)         10/24 0701  10/25 0700 10/25 0701  10/26 0700 10/26 0701  10/27 0700 10/27 0701  10/28 0700    P.O.   0 525    I.V. (mL/kg)   132.9 (2.9)     IV Piggyback  1739  100    Total Intake(mL/kg)  1739 (35.1) 132.9 (2.9) 625 (13.7)    Urine (mL/kg/hr)   1100 (1) 200 (0.4)    Stool   1 0    Total Output   1101 200    Net  +1739 -968.1 +425            Urine Unmeasured Occurrence  1 x  5 x    Stool Unmeasured Occurrence  0 x 1 x 3 x          Diet: Regular/House Diet; Texture: Regular Texture (IDDSI 7); Fluid Consistency: Thin (IDDSI 0)  ----------------------------------------------------------------------------------------------------------------------  Physical Exam  Vitals and nursing note reviewed.   Constitutional:       General: She is not in acute distress.  HENT:      Head: Normocephalic and atraumatic.      Mouth/Throat:      Mouth: Mucous membranes are dry.      Pharynx: Oropharynx is clear.   Eyes:      General: No scleral  icterus.     Extraocular Movements: Extraocular movements intact.   Cardiovascular:      Rate and Rhythm: Normal rate.      Pulses: Normal pulses.   Pulmonary:      Breath sounds: Wheezing (Improved from admission) and rales (Improved air movement with less crackles b/l) present.   Abdominal:      General: Abdomen is flat.      Palpations: Abdomen is soft.   Musculoskeletal:      Right lower leg: No edema.      Left lower leg: No edema.   Skin:     General: Skin is warm and dry.   Neurological:      Mental Status: She is alert and oriented to person, place, and time. Mental status is at baseline.   Psychiatric:         Mood and Affect: Mood normal.         Behavior: Behavior normal.       ----------------------------------------------------------------------------------------------------------------------    ----------------------------------------------------------------------------------------------------------------------  LABS:    CBC and coagulation:  Results from last 7 days   Lab Units 10/27/23  0052 10/26/23  0145   PROCALCITONIN ng/mL  --  7.30*   LACTATE mmol/L  --  1.2   CRP mg/dL 32.26* 42.39*   WBC 10*3/mm3 6.31 6.97   HEMOGLOBIN g/dL 10.4* 12.0   HEMATOCRIT % 34.7 38.1   MCV fL 93.8 90.7   MCHC g/dL 30.0* 31.5   PLATELETS 10*3/mm3 219 223     Acid/base balance:  Results from last 7 days   Lab Units 10/26/23  0626 10/26/23  0407 10/25/23  2307   PH, ARTERIAL pH units 7.321* 7.252* 7.401   PO2 ART mm Hg 84.7 54.2* 55.2*   PCO2, ARTERIAL mm Hg 50.1* 55.3* 47.0*   HCO3 ART mmol/L 25.9 24.3 29.2*     Renal and electrolytes:  Results from last 7 days   Lab Units 10/27/23  0052 10/26/23  1019 10/26/23  0145   SODIUM mmol/L 140 138 132*   POTASSIUM mmol/L 4.5 5.0 5.1   CHLORIDE mmol/L 104 103 94*   CO2 mmol/L 26.5 26.3 28.5   BUN mg/dL 26* 23 34*   CREATININE mg/dL 0.79 0.93 1.28*   CALCIUM mg/dL 9.2 8.6 9.0   GLUCOSE mg/dL 139* 190* 129*     Estimated Creatinine Clearance: 46.9 mL/min (by C-G formula based on  "SCr of 0.79 mg/dL).    Liver and pancreatic function:  Results from last 7 days   Lab Units 10/27/23  0052 10/26/23  0145   ALBUMIN g/dL 3.0* 3.4*   BILIRUBIN mg/dL <0.2 0.2   ALK PHOS U/L 82 79   AST (SGOT) U/L 24 22   ALT (SGPT) U/L 17 13     Endocrine function:  No results found for: \"HGBA1C\"  Point of care bedside glucose levels:      Glucose levels from the [facility]:  Results from last 7 days   Lab Units 10/27/23  0052 10/26/23  1019 10/26/23  0145   GLUCOSE mg/dL 139* 190* 129*     Lab Results   Component Value Date    TSH 1.060 03/24/2023    FREET4 1.11 12/14/2014     Cardiac:  Results from last 7 days   Lab Units 10/26/23  0356 10/26/23  0145   HSTROP T ng/L 21* 34*   PROBNP pg/mL  --  2,082.0*       Cultures:  Lab Results   Component Value Date    COLORU Dark Yellow (A) 10/06/2022    CLARITYU Clear 10/06/2022    SPECGRAV 1.015 05/24/2022    PHUR <=5.0 10/06/2022    GLUCOSEU Negative 10/06/2022    KETONESU Trace (A) 10/06/2022    BLOODU Negative 10/06/2022    NITRITEU Negative 10/06/2022    LEUKOCYTESUR Negative 10/06/2022    BILIRUBINUR Negative 10/06/2022    UROBILINOGEN 1.0 E.U./dL 10/06/2022    RBCUA 0-2 05/31/2022    WBCUA 6-12 (A) 05/31/2022    BACTERIA 1+ (A) 05/31/2022     Microbiology Results (last 10 days)       Procedure Component Value - Date/Time    Respiratory Culture - Sputum, Bronchus [[id number]] Collected: 10/27/23 0231    Lab Status: Preliminary result Specimen: Sputum from Bronchus Updated: 10/27/23 0350     Gram Stain Many (4+) WBCs seen      Few (2+) Gram positive cocci in pairs and clusters      Rare (1+) Gram negative bacilli    Legionella Antigen, Urine - Urine, Urine, Clean Catch [[id number]]  (Normal) Collected: 10/26/23 0855    Lab Status: Final result Specimen: Urine, Clean Catch Updated: 10/26/23 1101     LEGIONELLA ANTIGEN, URINE Negative    Narrative:      Presumptive negative for L. pneumophilia serogroup 1 antigen, suggesting no recent or current infection.    S. Pneumo Ag Urine or " CSF - Urine, Urine, Clean Catch [[id number]]  (Normal) Collected: 10/26/23 0855    Lab Status: Final result Specimen: Urine, Clean Catch Updated: 10/26/23 1900     Strep Pneumo Ag Negative    Respiratory Panel PCR w/COVID-19(SARS-CoV-2) SONDRA/ALFREDA/LEIF/PAD/COR/MAD/KERRY In-House, NP Swab in UTM/VTM, 3-4 HR TAT - Swab, Nasopharynx [[id number]]  (Normal) Collected: 10/26/23 0402    Lab Status: Final result Specimen: Swab from Nasopharynx Updated: 10/26/23 0458     ADENOVIRUS, PCR Not Detected     Coronavirus 229E Not Detected     Coronavirus HKU1 Not Detected     Coronavirus NL63 Not Detected     Coronavirus OC43 Not Detected     COVID19 Not Detected     Human Metapneumovirus Not Detected     Human Rhinovirus/Enterovirus Not Detected     Influenza A PCR Not Detected     Influenza B PCR Not Detected     Parainfluenza Virus 1 Not Detected     Parainfluenza Virus 2 Not Detected     Parainfluenza Virus 3 Not Detected     Parainfluenza Virus 4 Not Detected     RSV, PCR Not Detected     Bordetella pertussis pcr Not Detected     Bordetella parapertussis PCR Not Detected     Chlamydophila pneumoniae PCR Not Detected     Mycoplasma pneumo by PCR Not Detected    Narrative:      In the setting of a positive respiratory panel with a viral infection PLUS a negative procalcitonin without other underlying concern for bacterial infection, consider observing off antibiotics or discontinuation of antibiotics and continue supportive care. If the respiratory panel is positive for atypical bacterial infection (Bordetella pertussis, Chlamydophila pneumoniae, or Mycoplasma pneumoniae), consider antibiotic de-escalation to target atypical bacterial infection.    COVID PRE-OP / PRE-PROCEDURE SCREENING ORDER (NO ISOLATION) - Swab, Nasopharynx [[id number]]  (Normal) Collected: 10/26/23 0149    Lab Status: Final result Specimen: Swab from Nasopharynx Updated: 10/26/23 0214    Narrative:      The following orders were created for panel order COVID PRE-OP  "/ PRE-PROCEDURE SCREENING ORDER (NO ISOLATION) - Swab, Nasopharynx.  Procedure                               Abnormality         Status                     ---------                               -----------         ------                     COVID-19 and FLU A/B PCR...[[id number]]  Normal              Final result                 Please view results for these tests on the individual orders.    MRSA Screen, PCR (Inpatient) - Swab, Nares [[id number]]  (Normal) Collected: 10/26/23 0149    Lab Status: Final result Specimen: Swab from Nares Updated: 10/26/23 0305     MRSA PCR No MRSA Detected    Narrative:      The negative predictive value of this diagnostic test is high and should only be used to consider de-escalating anti-MRSA therapy. A positive result may indicate colonization with MRSA and must be correlated clinically.    COVID-19 and FLU A/B PCR - Swab, Nasopharynx [[id number]]  (Normal) Collected: 10/26/23 0149    Lab Status: Final result Specimen: Swab from Nasopharynx Updated: 10/26/23 0214     COVID19 Not Detected     Influenza A PCR Not Detected     Influenza B PCR Not Detected    Narrative:      Fact sheet for providers: https://www.fda.gov/media/531928/download    Fact sheet for patients: https://www.fda.gov/media/985631/download    Test performed by PCR.    Blood Culture - Blood, Arm, Left [[id number]]  (Normal) Collected: 10/26/23 0145    Lab Status: Preliminary result Specimen: Blood from Arm, Left Updated: 10/27/23 0200     Blood Culture No growth at 24 hours    Blood Culture - Blood, Arm, Right [[id number]]  (Normal) Collected: 10/26/23 0145    Lab Status: Preliminary result Specimen: Blood from Arm, Right Updated: 10/27/23 0200     Blood Culture No growth at 24 hours            No results found for: \"PREGTESTUR\", \"PREGSERUM\", \"HCG\", \"HCGQUANT\"  Pain Management Panel  More data exists         Latest Ref Rng & Units 10/6/2022 6/24/2017   Pain Management Panel   Amphetamine, Urine Qual Negative Negative  " Negative    Barbiturates Screen, Urine Negative Negative  Negative    Benzodiazepine Screen, Urine Negative Positive  Positive    Buprenorphine, Screen, Urine Negative Positive  Negative    Cocaine Screen, Urine Negative Negative  Negative    Methadone Screen , Urine Negative Negative  Negative    Methamphetamine, Ur Negative Positive  -       I have personally looked at the labs and they are summarized above.  ----------------------------------------------------------------------------------------------------------------------  Detailed radiology reports for the last 24 hours:    Imaging Results (Last 24 Hours)       ** No results found for the last 24 hours. **            Assessment & Plan      #Acute COPD exacerbation secondary to LLL PNA, likely bacterial given negative viral pcr  #Sepsis, POA improved  #BNP elevation secondary to hypoxia  #Myocardial injury secondary to abovem resolved  -Clinically improving and given risk factors will cont cefepime/azithromycin while awaiting sputum cx. Cont duonebs, added UPEP and cough syrup for sx  -On baseline 3-3.5L O2, will do walking pulse ox prior to discharge  -Wean steroid to PO in am    #ALLISON  -Improved w/IVF. Tolerating PO hydration currently    - - Chronic - -    #Anemia of ckd: Stable    VTE Prophylaxis:   Mechanical Order History:       None          Pharmalogical Order History:        Ordered     Dose Route Frequency Stop    10/26/23 0516  heparin (porcine) 5000 UNIT/ML injection 5,000 Units         5,000 Units SC Every 12 Hours Scheduled --                    Code Status and Medical Interventions:   Ordered at: 10/26/23 0324     Code Status (Patient has no pulse and is not breathing):    CPR (Attempt to Resuscitate)     Medical Interventions (Patient has pulse or is breathing):    Full Support         Disposition: Improving, anticipate d/c 24-48hrs    I have reviewed any copied/forwarded text or data, verified its accuracy, and updated as necessary  above.    Leroy Mosher MD  [facility]  10/27/23  18:50 EDT

## 2023-10-27 NOTE — PROGRESS NOTES
Antimicrobial Length of Therapy:    Day 2 of 3 azithromycin  Day 2 of 5 cefepime    Thank you.  Marielena Castaneda, Pharm.D.  10/27/2023  09:44 EDT

## 2023-10-28 LAB
DEPRECATED RDW RBC AUTO: 54.7 FL (ref 37–54)
ERYTHROCYTE [DISTWIDTH] IN BLOOD BY AUTOMATED COUNT: 15.8 % (ref 12.3–15.4)
HCT VFR BLD AUTO: 35 % (ref 34–46.6)
HGB BLD-MCNC: 10.4 G/DL (ref 12–15.9)
MCH RBC QN AUTO: 27.8 PG (ref 26.6–33)
MCHC RBC AUTO-ENTMCNC: 29.7 G/DL (ref 31.5–35.7)
MCV RBC AUTO: 93.6 FL (ref 79–97)
PLATELET # BLD AUTO: 221 10*3/MM3 (ref 140–450)
PMV BLD AUTO: 10.7 FL (ref 6–12)
RBC # BLD AUTO: 3.74 10*6/MM3 (ref 3.77–5.28)
WBC NRBC COR # BLD: 8.9 10*3/MM3 (ref 3.4–10.8)

## 2023-10-28 PROCEDURE — 94799 UNLISTED PULMONARY SVC/PX: CPT

## 2023-10-28 PROCEDURE — 25010000002 CEFEPIME PER 500 MG: Performed by: HOSPITALIST

## 2023-10-28 PROCEDURE — 25010000002 AZITHROMYCIN PER 500 MG: Performed by: HOSPITALIST

## 2023-10-28 PROCEDURE — 99232 SBSQ HOSP IP/OBS MODERATE 35: CPT | Performed by: STUDENT IN AN ORGANIZED HEALTH CARE EDUCATION/TRAINING PROGRAM

## 2023-10-28 PROCEDURE — 25810000003 SODIUM CHLORIDE 0.9 % SOLUTION 250 ML FLEX CONT: Performed by: HOSPITALIST

## 2023-10-28 PROCEDURE — 94761 N-INVAS EAR/PLS OXIMETRY MLT: CPT

## 2023-10-28 PROCEDURE — 25010000002 HEPARIN (PORCINE) PER 1000 UNITS: Performed by: HOSPITALIST

## 2023-10-28 PROCEDURE — 85027 COMPLETE CBC AUTOMATED: CPT | Performed by: STUDENT IN AN ORGANIZED HEALTH CARE EDUCATION/TRAINING PROGRAM

## 2023-10-28 PROCEDURE — 94664 DEMO&/EVAL PT USE INHALER: CPT

## 2023-10-28 PROCEDURE — 94660 CPAP INITIATION&MGMT: CPT

## 2023-10-28 PROCEDURE — 25010000002 METHYLPREDNISOLONE PER 40 MG: Performed by: HOSPITALIST

## 2023-10-28 RX ORDER — BUTALBITAL, ACETAMINOPHEN AND CAFFEINE 50; 325; 40 MG/1; MG/1; MG/1
1 TABLET ORAL ONCE
Status: COMPLETED | OUTPATIENT
Start: 2023-10-28 | End: 2023-10-28

## 2023-10-28 RX ORDER — LISINOPRIL 10 MG/1
10 TABLET ORAL
Status: DISCONTINUED | OUTPATIENT
Start: 2023-10-28 | End: 2023-10-29 | Stop reason: HOSPADM

## 2023-10-28 RX ADMIN — DOCUSATE SODIUM 50 MG AND SENNOSIDES 8.6 MG 2 TABLET: 8.6; 5 TABLET, FILM COATED ORAL at 09:02

## 2023-10-28 RX ADMIN — GUAIFENESIN AND CODEINE PHOSPHATE 5 ML: 10; 100 LIQUID ORAL at 17:44

## 2023-10-28 RX ADMIN — GABAPENTIN 300 MG: 300 CAPSULE ORAL at 21:35

## 2023-10-28 RX ADMIN — IPRATROPIUM BROMIDE AND ALBUTEROL SULFATE 3 ML: .5; 2.5 SOLUTION RESPIRATORY (INHALATION) at 01:02

## 2023-10-28 RX ADMIN — BUDESONIDE AND FORMOTEROL FUMARATE DIHYDRATE 2 PUFF: 160; 4.5 AEROSOL RESPIRATORY (INHALATION) at 18:58

## 2023-10-28 RX ADMIN — NICOTINE 1 PATCH: 14 PATCH, EXTENDED RELEASE TRANSDERMAL at 09:01

## 2023-10-28 RX ADMIN — ALPRAZOLAM 1 MG: 1 TABLET ORAL at 21:35

## 2023-10-28 RX ADMIN — METHYLPREDNISOLONE SODIUM SUCCINATE 40 MG: 40 INJECTION, POWDER, FOR SOLUTION INTRAMUSCULAR; INTRAVENOUS at 09:01

## 2023-10-28 RX ADMIN — ALPRAZOLAM 1 MG: 1 TABLET ORAL at 09:02

## 2023-10-28 RX ADMIN — CASTOR OIL AND BALSAM, PERU 1 APPLICATION: 788; 87 OINTMENT TOPICAL at 09:02

## 2023-10-28 RX ADMIN — CASTOR OIL AND BALSAM, PERU 1 APPLICATION: 788; 87 OINTMENT TOPICAL at 21:36

## 2023-10-28 RX ADMIN — IPRATROPIUM BROMIDE AND ALBUTEROL SULFATE 3 ML: .5; 2.5 SOLUTION RESPIRATORY (INHALATION) at 07:14

## 2023-10-28 RX ADMIN — HEPARIN SODIUM 5000 UNITS: 5000 INJECTION INTRAVENOUS; SUBCUTANEOUS at 09:01

## 2023-10-28 RX ADMIN — IPRATROPIUM BROMIDE AND ALBUTEROL SULFATE 3 ML: .5; 2.5 SOLUTION RESPIRATORY (INHALATION) at 13:53

## 2023-10-28 RX ADMIN — BUTALBITAL, ACETAMINOPHEN AND CAFFEINE 1 TABLET: 325; 50; 40 TABLET ORAL at 13:02

## 2023-10-28 RX ADMIN — AZITHROMYCIN DIHYDRATE 500 MG: 500 INJECTION, POWDER, LYOPHILIZED, FOR SOLUTION INTRAVENOUS at 05:24

## 2023-10-28 RX ADMIN — IPRATROPIUM BROMIDE AND ALBUTEROL SULFATE 3 ML: .5; 2.5 SOLUTION RESPIRATORY (INHALATION) at 18:58

## 2023-10-28 RX ADMIN — CEFEPIME 2000 MG: 2 INJECTION, POWDER, FOR SOLUTION INTRAVENOUS at 02:41

## 2023-10-28 RX ADMIN — Medication 10 ML: at 09:02

## 2023-10-28 RX ADMIN — GABAPENTIN 300 MG: 300 CAPSULE ORAL at 05:26

## 2023-10-28 RX ADMIN — OXYBUTYNIN CHLORIDE 5 MG: 5 TABLET ORAL at 21:35

## 2023-10-28 RX ADMIN — ATORVASTATIN CALCIUM 20 MG: 20 TABLET, FILM COATED ORAL at 09:01

## 2023-10-28 RX ADMIN — Medication 10 ML: at 21:36

## 2023-10-28 RX ADMIN — ASPIRIN 81 MG: 81 TABLET, COATED ORAL at 09:01

## 2023-10-28 RX ADMIN — METHYLPREDNISOLONE SODIUM SUCCINATE 40 MG: 40 INJECTION, POWDER, FOR SOLUTION INTRAMUSCULAR; INTRAVENOUS at 21:35

## 2023-10-28 RX ADMIN — CEFEPIME 2000 MG: 2 INJECTION, POWDER, FOR SOLUTION INTRAVENOUS at 14:11

## 2023-10-28 RX ADMIN — OXYBUTYNIN CHLORIDE 5 MG: 5 TABLET ORAL at 09:02

## 2023-10-28 RX ADMIN — BUDESONIDE AND FORMOTEROL FUMARATE DIHYDRATE 2 PUFF: 160; 4.5 AEROSOL RESPIRATORY (INHALATION) at 07:14

## 2023-10-28 RX ADMIN — GABAPENTIN 300 MG: 300 CAPSULE ORAL at 14:12

## 2023-10-28 RX ADMIN — HYDROCODONE BITARTRATE AND ACETAMINOPHEN 1 TABLET: 10; 325 TABLET ORAL at 09:01

## 2023-10-28 RX ADMIN — LISINOPRIL 10 MG: 10 TABLET ORAL at 09:52

## 2023-10-28 RX ADMIN — HEPARIN SODIUM 5000 UNITS: 5000 INJECTION INTRAVENOUS; SUBCUTANEOUS at 21:35

## 2023-10-28 NOTE — PROGRESS NOTES
[facility] HOSPITALIST PROGRESS NOTE     Patient Identification:  Name:  Dorothy Cespedes  Age:  72 y.o.  Sex:  female  :  1950  MRN:  [id number]  Visit Number:  [id number]  ROOM: [place] [address]     Primary Care Provider:  Johann Juárez MD     Date of Admission: 10/26/2023    Length of stay in inpatient status:  2    Subjective     Chief Compliant:    Chief Complaint   Patient presents with    Fall    Syncope    Shortness of Breath       Patient improving, cough better with liquid cough syrup and wheezing still present but improving. O2 requirement stable        Objective     Current Hospital Meds:  ALPRAZolam, 1 mg, Oral, BID  aspirin, 81 mg, Oral, Daily  atorvastatin, 20 mg, Oral, Daily  budesonide-formoterol, 2 puff, Inhalation, BID - RT  castor oil-balsam peru, 1 application , Topical, Q12H  cefepime, 2,000 mg, Intravenous, Q12H  gabapentin, 300 mg, Oral, Q8H  heparin (porcine), 5,000 Units, Subcutaneous, Q12H  ipratropium-albuterol, 3 mL, Nebulization, 4x Daily - RT  lisinopril, 10 mg, Oral, Q24H  methylPREDNISolone sodium succinate, 40 mg, Intravenous, Q12H  nicotine, 1 patch, Transdermal, Q24H  oxybutynin, 5 mg, Oral, BID  senna-docusate sodium, 2 tablet, Oral, BID  sodium chloride, 10 mL, Intravenous, Q12H       Current Antimicrobial Therapy:  Anti-Infectives (From admission, onward)      Ordered     Dose/Rate Route Frequency Start Stop    10/26/23 0323  cefepime 2 gm IVPB in 100 ml NS (VTB)        Ordering Provider: Hector Anaya MD    2,000 mg  over 4 Hours Intravenous Every 12 Hours 10/26/23 1400 10/31/23 1359    10/26/23 0358  azithromycin (ZITHROMAX) 500 mg in sodium chloride 0.9 % 250 mL IVPB-VTB        Ordering Provider: Hector Anaya MD    500 mg  over 60 Minutes Intravenous Every 24 Hours 10/26/23 0500 10/28/23 0624    10/26/23 0118  cefepime 2 gm IVPB in 100 ml NS (VTB)        Ordering Provider: Karel, Amos Kar, DO    2,000 mg  over 30 Minutes  Intravenous Once 10/26/23 0134 10/26/23 0221    10/26/23 0118  vancomycin (VANCOCIN) 1,000 mg in sodium chloride 0.9 % 250 mL IVPB-VTB        Ordering Provider: Amos Vinson DO    20 mg/kg × 46.3 kg  250 mL/hr over 60 Minutes Intravenous Once 10/26/23 0134 10/26/23 0334          Current Diuretic Therapy:  Diuretics (From admission, onward)      None          ----------------------------------------------------------------------------------------------------------------------  Vital Signs:  Temp:  [97.8 °F (36.6 °C)-98.2 °F (36.8 °C)] 97.8 °F (36.6 °C)  Heart Rate:  [] 85  Resp:  [19-32] 21  BP: (136-172)/() 171/95  SpO2:  [88 %-100 %] 95 %  on  Flow (L/min):  [3] 3;   Device (Oxygen Therapy): nasal cannula  Body mass index is 20.83 kg/m².    Wt Readings from Last 3 Encounters:   10/28/23 50 kg (110 lb 3.7 oz)   10/02/23 47.6 kg (105 lb)   10/06/22 44 kg (97 lb)     Intake & Output (last 3 days)         10/25 0701  10/26 0700 10/26 0701  10/27 0700 10/27 0701  10/28 0700 10/28 0701  10/29 0700    P.O.  0 525 680    I.V. (mL/kg)  132.9 (2.9)      IV Piggyback 1739  100     Total Intake(mL/kg) 1739 (35.1) 132.9 (2.9) 625 (12.5) 680 (13.6)    Urine (mL/kg/hr)  1100 (1) 700 (0.6)     Stool  1 0     Total Output  1101 700     Net +1739 -968.1 -75 +680            Urine Unmeasured Occurrence 1 x  7 x 4 x    Stool Unmeasured Occurrence 0 x 1 x 3 x           Diet: Regular/House Diet; Texture: Regular Texture (IDDSI 7); Fluid Consistency: Thin (IDDSI 0)  ----------------------------------------------------------------------------------------------------------------------  Physical Exam  Vitals and nursing note reviewed.   Constitutional:       General: She is not in acute distress.  HENT:      Head: Normocephalic and atraumatic.      Mouth/Throat:      Mouth: Mucous membranes are dry.      Pharynx: Oropharynx is clear.   Eyes:      General: No scleral icterus.     Extraocular Movements: Extraocular  movements intact.   Cardiovascular:      Rate and Rhythm: Normal rate.      Pulses: Normal pulses.   Pulmonary:      Breath sounds: Wheezing (Improved from admission) present. No rales (Improved air movement with less crackles b/l).   Abdominal:      General: Abdomen is flat.      Palpations: Abdomen is soft.   Musculoskeletal:      Right lower leg: No edema.      Left lower leg: No edema.   Skin:     General: Skin is warm and dry.   Neurological:      Mental Status: She is alert and oriented to person, place, and time. Mental status is at baseline.   Psychiatric:         Mood and Affect: Mood normal.         Behavior: Behavior normal.       ----------------------------------------------------------------------------------------------------------------------    ----------------------------------------------------------------------------------------------------------------------  LABS:    CBC and coagulation:  Results from last 7 days   Lab Units 10/28/23  0100 10/27/23  0052 10/26/23  0145   PROCALCITONIN ng/mL  --   --  7.30*   LACTATE mmol/L  --   --  1.2   CRP mg/dL  --  32.26* 42.39*   WBC 10*3/mm3 8.90 6.31 6.97   HEMOGLOBIN g/dL 10.4* 10.4* 12.0   HEMATOCRIT % 35.0 34.7 38.1   MCV fL 93.6 93.8 90.7   MCHC g/dL 29.7* 30.0* 31.5   PLATELETS 10*3/mm3 221 219 223     Acid/base balance:  Results from last 7 days   Lab Units 10/26/23  0626 10/26/23  0407 10/25/23  2307   PH, ARTERIAL pH units 7.321* 7.252* 7.401   PO2 ART mm Hg 84.7 54.2* 55.2*   PCO2, ARTERIAL mm Hg 50.1* 55.3* 47.0*   HCO3 ART mmol/L 25.9 24.3 29.2*     Renal and electrolytes:  Results from last 7 days   Lab Units 10/27/23  0052 10/26/23  1019 10/26/23  0145   SODIUM mmol/L 140 138 132*   POTASSIUM mmol/L 4.5 5.0 5.1   CHLORIDE mmol/L 104 103 94*   CO2 mmol/L 26.5 26.3 28.5   BUN mg/dL 26* 23 34*   CREATININE mg/dL 0.79 0.93 1.28*   CALCIUM mg/dL 9.2 8.6 9.0   GLUCOSE mg/dL 139* 190* 129*     Estimated Creatinine Clearance: 51.6 mL/min (by C-G  "formula based on SCr of 0.79 mg/dL).    Liver and pancreatic function:  Results from last 7 days   Lab Units 10/27/23  0052 10/26/23  0145   ALBUMIN g/dL 3.0* 3.4*   BILIRUBIN mg/dL <0.2 0.2   ALK PHOS U/L 82 79   AST (SGOT) U/L 24 22   ALT (SGPT) U/L 17 13     Endocrine function:  No results found for: \"HGBA1C\"  Point of care bedside glucose levels:      Glucose levels from the [facility]:  Results from last 7 days   Lab Units 10/27/23  0052 10/26/23  1019 10/26/23  0145   GLUCOSE mg/dL 139* 190* 129*     Lab Results   Component Value Date    TSH 1.060 03/24/2023    FREET4 1.11 12/14/2014     Cardiac:  Results from last 7 days   Lab Units 10/26/23  0356 10/26/23  0145   HSTROP T ng/L 21* 34*   PROBNP pg/mL  --  2,082.0*       Cultures:  Lab Results   Component Value Date    COLORU Dark Yellow (A) 10/06/2022    CLARITYU Clear 10/06/2022    SPECGRAV 1.015 05/24/2022    PHUR <=5.0 10/06/2022    GLUCOSEU Negative 10/06/2022    KETONESU Trace (A) 10/06/2022    BLOODU Negative 10/06/2022    NITRITEU Negative 10/06/2022    LEUKOCYTESUR Negative 10/06/2022    BILIRUBINUR Negative 10/06/2022    UROBILINOGEN 1.0 E.U./dL 10/06/2022    RBCUA 0-2 05/31/2022    WBCUA 6-12 (A) 05/31/2022    BACTERIA 1+ (A) 05/31/2022     Microbiology Results (last 10 days)       Procedure Component Value - Date/Time    Respiratory Culture - Sputum, Bronchus [[id number]] Collected: 10/27/23 0231    Lab Status: Preliminary result Specimen: Sputum from Bronchus Updated: 10/28/23 0941     Respiratory Culture Scant growth (1+) The culture consists of normal respiratory efrem. This is a preliminary report; final report to follow.     Gram Stain Many (4+) WBCs seen      Few (2+) Gram positive cocci in pairs and clusters      Rare (1+) Gram negative bacilli    Legionella Antigen, Urine - Urine, Urine, Clean Catch [[id number]]  (Normal) Collected: 10/26/23 0889    Lab Status: Final result Specimen: Urine, Clean Catch Updated: 10/26/23 1101     LEGIONELLA ANTIGEN, " URINE Negative    Narrative:      Presumptive negative for L. pneumophilia serogroup 1 antigen, suggesting no recent or current infection.    S. Pneumo Ag Urine or CSF - Urine, Urine, Clean Catch [[id number]]  (Normal) Collected: 10/26/23 0855    Lab Status: Final result Specimen: Urine, Clean Catch Updated: 10/26/23 1900     Strep Pneumo Ag Negative    Respiratory Panel PCR w/COVID-19(SARS-CoV-2) SONDRA/ALFREDA/LEIF/PAD/COR/MAD/KERRY In-House, NP Swab in UTM/VTM, 3-4 HR TAT - Swab, Nasopharynx [[id number]]  (Normal) Collected: 10/26/23 0402    Lab Status: Final result Specimen: Swab from Nasopharynx Updated: 10/26/23 0458     ADENOVIRUS, PCR Not Detected     Coronavirus 229E Not Detected     Coronavirus HKU1 Not Detected     Coronavirus NL63 Not Detected     Coronavirus OC43 Not Detected     COVID19 Not Detected     Human Metapneumovirus Not Detected     Human Rhinovirus/Enterovirus Not Detected     Influenza A PCR Not Detected     Influenza B PCR Not Detected     Parainfluenza Virus 1 Not Detected     Parainfluenza Virus 2 Not Detected     Parainfluenza Virus 3 Not Detected     Parainfluenza Virus 4 Not Detected     RSV, PCR Not Detected     Bordetella pertussis pcr Not Detected     Bordetella parapertussis PCR Not Detected     Chlamydophila pneumoniae PCR Not Detected     Mycoplasma pneumo by PCR Not Detected    Narrative:      In the setting of a positive respiratory panel with a viral infection PLUS a negative procalcitonin without other underlying concern for bacterial infection, consider observing off antibiotics or discontinuation of antibiotics and continue supportive care. If the respiratory panel is positive for atypical bacterial infection (Bordetella pertussis, Chlamydophila pneumoniae, or Mycoplasma pneumoniae), consider antibiotic de-escalation to target atypical bacterial infection.    COVID PRE-OP / PRE-PROCEDURE SCREENING ORDER (NO ISOLATION) - Swab, Nasopharynx [[id number]]  (Normal) Collected: 10/26/23 0143  "   Lab Status: Final result Specimen: Swab from Nasopharynx Updated: 10/26/23 0214    Narrative:      The following orders were created for panel order COVID PRE-OP / PRE-PROCEDURE SCREENING ORDER (NO ISOLATION) - Swab, Nasopharynx.  Procedure                               Abnormality         Status                     ---------                               -----------         ------                     COVID-19 and FLU A/B PCR...[[id number]]  Normal              Final result                 Please view results for these tests on the individual orders.    MRSA Screen, PCR (Inpatient) - Swab, Nares [[id number]]  (Normal) Collected: 10/26/23 0149    Lab Status: Final result Specimen: Swab from Nares Updated: 10/26/23 0305     MRSA PCR No MRSA Detected    Narrative:      The negative predictive value of this diagnostic test is high and should only be used to consider de-escalating anti-MRSA therapy. A positive result may indicate colonization with MRSA and must be correlated clinically.    COVID-19 and FLU A/B PCR - Swab, Nasopharynx [[id number]]  (Normal) Collected: 10/26/23 0149    Lab Status: Final result Specimen: Swab from Nasopharynx Updated: 10/26/23 0214     COVID19 Not Detected     Influenza A PCR Not Detected     Influenza B PCR Not Detected    Narrative:      Fact sheet for providers: https://www.fda.gov/media/043693/download    Fact sheet for patients: https://www.fda.gov/media/844161/download    Test performed by PCR.    Blood Culture - Blood, Arm, Left [[id number]]  (Normal) Collected: 10/26/23 0145    Lab Status: Preliminary result Specimen: Blood from Arm, Left Updated: 10/28/23 0200     Blood Culture No growth at 2 days    Blood Culture - Blood, Arm, Right [[id number]]  (Normal) Collected: 10/26/23 0145    Lab Status: Preliminary result Specimen: Blood from Arm, Right Updated: 10/28/23 0200     Blood Culture No growth at 2 days            No results found for: \"PREGTESTUR\", \"PREGSERUM\", \"HCG\", " "\"HCGQUANT\"  Pain Management Panel  More data exists         Latest Ref Rng & Units 10/6/2022 6/24/2017   Pain Management Panel   Amphetamine, Urine Qual Negative Negative  Negative    Barbiturates Screen, Urine Negative Negative  Negative    Benzodiazepine Screen, Urine Negative Positive  Positive    Buprenorphine, Screen, Urine Negative Positive  Negative    Cocaine Screen, Urine Negative Negative  Negative    Methadone Screen , Urine Negative Negative  Negative    Methamphetamine, Ur Negative Positive  -       I have personally looked at the labs and they are summarized above.  ----------------------------------------------------------------------------------------------------------------------  Detailed radiology reports for the last 24 hours:    Imaging Results (Last 24 Hours)       ** No results found for the last 24 hours. **            Assessment & Plan      #Acute COPD exacerbation secondary to LLL PNA, likely bacterial given negative viral pcr  #Sepsis, POA improved  #BNP elevation secondary to hypoxia  #Myocardial injury secondary to abovem resolved  -Clinically improving and given risk factors will cont cefepime/azithromycin while awaiting sputum cx. Cont duonebs, added UPEP and cough syrup for sx  -On baseline 3-3.5L O2, will do walking pulse ox prior to discharge  -Wean steroid to PO in am  -Prelim sputum normal efrem, cont management above and switch to po abx and steroid in am for likely d/c    #ALLISON  -Improved w/IVF. Tolerating PO hydration currently    - - Chronic - -    #Anemia of ckd: Stable  #HTN: Lisinopril resumed today    VTE Prophylaxis:   Mechanical Order History:       None          Pharmalogical Order History:        Ordered     Dose Route Frequency Stop    10/26/23 0516  heparin (porcine) 5000 UNIT/ML injection 5,000 Units         5,000 Units SC Every 12 Hours Scheduled --                    Code Status and Medical Interventions:   Ordered at: 10/26/23 0324     Code Status (Patient has no " pulse and is not breathing):    CPR (Attempt to Resuscitate)     Medical Interventions (Patient has pulse or is breathing):    Full Support         Disposition: Improving, anticipate d/c 24-48hrs    I have reviewed any copied/forwarded text or data, verified its accuracy, and updated as necessary above.    Leroy Mosher MD  [facility] Hospitalist  10/28/23  18:38 EDT

## 2023-10-28 NOTE — PLAN OF CARE
Goal Outcome Evaluation:  Plan of Care Reviewed With: patient        Progress: improving  Outcome Evaluation: Pt doing well today. Sats staying greater than 90% while awake. Pt sitting up at bedside for meals. Cont IV ABX. Discharge pending sputum specimen per MD. C/O headache today. PRN administered.

## 2023-10-28 NOTE — PLAN OF CARE
Goal Outcome Evaluation:                 Patient alert and oriented x 4, patient on 3L NC and tolerating well, patient NSR on the monitor, patient resting in bed at this time, VSS at this time, call light within reach and bed alarm set.

## 2023-10-29 ENCOUNTER — READMISSION MANAGEMENT (OUTPATIENT)
Dept: CALL CENTER | Facility: HOSPITAL | Age: 73
End: 2023-10-29
Payer: MEDICARE

## 2023-10-29 VITALS
OXYGEN SATURATION: 93 % | DIASTOLIC BLOOD PRESSURE: 89 MMHG | TEMPERATURE: 97.5 F | WEIGHT: 110.23 LBS | HEART RATE: 77 BPM | BODY MASS INDEX: 20.81 KG/M2 | SYSTOLIC BLOOD PRESSURE: 155 MMHG | RESPIRATION RATE: 19 BRPM | HEIGHT: 61 IN

## 2023-10-29 LAB
BACTERIA SPEC RESP CULT: NORMAL
GRAM STN SPEC: NORMAL

## 2023-10-29 PROCEDURE — 99239 HOSP IP/OBS DSCHRG MGMT >30: CPT | Performed by: STUDENT IN AN ORGANIZED HEALTH CARE EDUCATION/TRAINING PROGRAM

## 2023-10-29 PROCEDURE — 25010000002 CEFEPIME PER 500 MG: Performed by: HOSPITALIST

## 2023-10-29 PROCEDURE — 94664 DEMO&/EVAL PT USE INHALER: CPT

## 2023-10-29 PROCEDURE — 94799 UNLISTED PULMONARY SVC/PX: CPT

## 2023-10-29 PROCEDURE — 94760 N-INVAS EAR/PLS OXIMETRY 1: CPT

## 2023-10-29 PROCEDURE — 94660 CPAP INITIATION&MGMT: CPT

## 2023-10-29 PROCEDURE — 94761 N-INVAS EAR/PLS OXIMETRY MLT: CPT

## 2023-10-29 PROCEDURE — 25010000002 HEPARIN (PORCINE) PER 1000 UNITS: Performed by: HOSPITALIST

## 2023-10-29 PROCEDURE — 25010000002 METHYLPREDNISOLONE PER 40 MG: Performed by: HOSPITALIST

## 2023-10-29 RX ORDER — BENZONATATE 100 MG/1
100 CAPSULE ORAL 3 TIMES DAILY PRN
Qty: 15 CAPSULE | Refills: 0 | Status: SHIPPED | OUTPATIENT
Start: 2023-10-29 | End: 2023-11-03

## 2023-10-29 RX ORDER — CEFDINIR 300 MG/1
300 CAPSULE ORAL 2 TIMES DAILY
Qty: 8 CAPSULE | Refills: 0 | Status: SHIPPED | OUTPATIENT
Start: 2023-10-29 | End: 2023-11-02

## 2023-10-29 RX ORDER — PREDNISONE 20 MG/1
40 TABLET ORAL DAILY
Qty: 4 TABLET | Refills: 0 | Status: SHIPPED | OUTPATIENT
Start: 2023-10-29 | End: 2023-10-31

## 2023-10-29 RX ADMIN — HYDROCODONE BITARTRATE AND ACETAMINOPHEN 1 TABLET: 10; 325 TABLET ORAL at 00:37

## 2023-10-29 RX ADMIN — HEPARIN SODIUM 5000 UNITS: 5000 INJECTION INTRAVENOUS; SUBCUTANEOUS at 08:30

## 2023-10-29 RX ADMIN — LISINOPRIL 10 MG: 10 TABLET ORAL at 08:30

## 2023-10-29 RX ADMIN — ATORVASTATIN CALCIUM 20 MG: 20 TABLET, FILM COATED ORAL at 08:30

## 2023-10-29 RX ADMIN — ASPIRIN 81 MG: 81 TABLET, COATED ORAL at 08:30

## 2023-10-29 RX ADMIN — CASTOR OIL AND BALSAM, PERU 1 APPLICATION: 788; 87 OINTMENT TOPICAL at 08:30

## 2023-10-29 RX ADMIN — ALPRAZOLAM 1 MG: 1 TABLET ORAL at 08:30

## 2023-10-29 RX ADMIN — CEFEPIME 2000 MG: 2 INJECTION, POWDER, FOR SOLUTION INTRAVENOUS at 02:35

## 2023-10-29 RX ADMIN — GUAIFENESIN AND CODEINE PHOSPHATE 5 ML: 10; 100 LIQUID ORAL at 05:28

## 2023-10-29 RX ADMIN — BUDESONIDE AND FORMOTEROL FUMARATE DIHYDRATE 2 PUFF: 160; 4.5 AEROSOL RESPIRATORY (INHALATION) at 07:17

## 2023-10-29 RX ADMIN — OXYBUTYNIN CHLORIDE 5 MG: 5 TABLET ORAL at 08:30

## 2023-10-29 RX ADMIN — IPRATROPIUM BROMIDE AND ALBUTEROL SULFATE 3 ML: .5; 2.5 SOLUTION RESPIRATORY (INHALATION) at 07:17

## 2023-10-29 RX ADMIN — METHYLPREDNISOLONE SODIUM SUCCINATE 40 MG: 40 INJECTION, POWDER, FOR SOLUTION INTRAMUSCULAR; INTRAVENOUS at 08:30

## 2023-10-29 RX ADMIN — DOCUSATE SODIUM 50 MG AND SENNOSIDES 8.6 MG 2 TABLET: 8.6; 5 TABLET, FILM COATED ORAL at 08:30

## 2023-10-29 RX ADMIN — NICOTINE 1 PATCH: 14 PATCH, EXTENDED RELEASE TRANSDERMAL at 08:30

## 2023-10-29 RX ADMIN — GABAPENTIN 300 MG: 300 CAPSULE ORAL at 05:24

## 2023-10-29 NOTE — PLAN OF CARE
Goal Outcome Evaluation:                 Pt being discharged home with son. IV removed Tele returned.

## 2023-10-29 NOTE — OUTREACH NOTE
Prep Survey      Flowsheet Row Responses   [facility] patient discharged from? [facility]   Is LACE score < 7 ? No   Eligibility Readm Mgmt   Discharge diagnosis short of breath COPD exacerbation   Does the patient have one of the following disease processes/diagnoses(primary or secondary)? COPD   Does the patient have Home health ordered? No   Is there a DME ordered? No   Prep survey completed? Yes            CLOVIS KENNEY - Registered Nurse

## 2023-10-29 NOTE — PLAN OF CARE
Goal Outcome Evaluation:              Outcome Evaluation: Pt resting in bed at this time. No s/s of distress noted. 3L NC in place. Pt has been up to BSC with assistance. Cont IV abx. Complained of pain, PRN medications given per MAR. No requests at this time .Will continue POC.

## 2023-10-30 ENCOUNTER — TELEPHONE (OUTPATIENT)
Dept: MEDSURG UNIT | Facility: HOSPITAL | Age: 73
End: 2023-10-30
Payer: MEDICARE

## 2023-10-30 NOTE — PAYOR COMM NOTE
"CONTACT:  HUMBLE HOWARD MSN, RN  UTILIZATION MANAGEMENT DEPT.  [facility]  [address], [address]  PHONE:  [phone]  FAX: [phone]    PATIENT DISCHARGED TO HOME ON 10/29/23, AUTHORIZATION STILL PENDING    REF # [id number]    Sunitha Cespedes (72 y.o. Female)       Date of Birth   1950    Social Security Number       Address   [address] [address]    Home Phone   [phone]    MRN   [id number]       [facility]    Marital Status                               Admission Date   10/26/23    Admission Type   Emergency    Admitting Provider   Hector Anaya MD    Attending Provider       Department, Room/Bed   [address]/       Discharge Date   10/29/2023    Discharge Disposition   Home or Self Care    Discharge Destination                                 Attending Provider: (none)   Allergies: No Known Allergies    Isolation: None   Infection: None   Code Status: Prior    Ht: 154.9 cm (61\")   Wt: 50 kg (110 lb 3.7 oz)    Admission Cmt: None   Principal Problem: Acute respiratory failure with hypoxemia [J96.01]                   Active Insurance as of 10/26/2023       Primary Coverage       Payor Plan Insurance Group Employer/Plan Group    ANTHEM MEDICARE REPLACEMENT ANTHEM MEDICARE ADVANTAGE [id number]       Payor Plan Address Payor Plan Phone Number Payor Plan Fax Number Effective Dates    [address] [phone]  5/1/2021 - None Entered    [facility] [address]         Subscriber Name Subscriber Birth Date Member ID       SUNITHA CESPEDES 1950 [id number]               Secondary Coverage       Payor Plan Insurance Group Employer/Plan Group    [facility] MEDICAID        Payor Plan Address Payor Plan Phone Number Payor Plan Fax Number Effective Dates    [address] [phone]  6/24/2017 - None Entered    [facility] [address]         Subscriber Name Subscriber Birth Date Member ID       " MINOSUNITHA 1950 [id number]                     Emergency Contacts        (Rel.) Home Phone Work Phone Mobile Phone    Ivonne Waterman (Friend) [phone] -- --    MonjavierRobert hernandez (Son) [phone]              Discharge Summary    No notes of this type exist for this encounter.

## 2023-10-31 LAB
BACTERIA SPEC AEROBE CULT: NORMAL
BACTERIA SPEC AEROBE CULT: NORMAL

## 2023-10-31 NOTE — DISCHARGE SUMMARY
[facility]    Patient Identification:  Name:  Dorothy Cespedes  Age:  72 y.o.  Sex:  female  :  1950  MRN:  [id number]  Visit Number:  [id number]    Date of Admission: 10/26/2023  Date of Discharge: 10/29/2023    PCP: Johann Juárez MD    DISCHARGE DIAGNOSES  #Acute COPD exacerbation secondary to LLL PNA, likely bacterial given negative viral pcr  #Sepsis, POA improved  #BNP elevation secondary to hypoxia  #Myocardial injury secondary to abovem resolved  #ALLISON on CKD    - - Chronic - -     #Anemia of ckd  #HTN    CONSULTS   Consults       No orders found from 2023 to 10/27/2023.            PROCEDURES PERFORMED  -    HOSPITAL COURSE  In brief, Dorothy Cespedes is a 72 y.o. female with above noted PMH that presented initially with progressive dyspnea for multiple day history found to have LLL PNA treated with cefepime and azithromycin given structural lung disease hx along with steroids, upep, and nebulizer treatments for COPDe with sx improvement prior to discharge. O2 remained at baseline requirement on 3-3.5L and she will f/u outpatient with her pcp. She has home nebulizer and COPD rescue kit already and was seen by COPD educator prior to discharge. She elected not to f/u with out COPD clinical locally but has f/u pulm appointment with Dr. Dodson in [place] 2023.     For more detailed problem based summary of hospitalization and follow-up plan, see below:    #Acute COPD exacerbation secondary to LLL PNA, likely bacterial given negative viral pcr  #Sepsis, POA improved  #BNP elevation secondary to hypoxia  #Myocardial injury secondary to abovem resolved  -Given risk factors she was treated with cefepime/azithromycin while awaiting sputum cx, once sputum returned normal efrem w/o pseudomonas or MRSA, she was narrowed to cefdinir PO at discharge to finish course and had complete 3 day course azithromycin prior jessenia discharge  -On baseline  3-3.5L O2, mild dyspnea with ambulation but no significant desat with walk test  -Cont 5 day prednisone course at discharge     #ALLISON  -Improved w/IVF. Tolerating PO hydration     - - Chronic - -     #Anemia of ckd: Stable  #HTN: Lisinopril resumed prior to discharge      VITAL SIGNS:        on   ;        Body mass index is 20.83 kg/m².  Wt Readings from Last 3 Encounters:   10/28/23 50 kg (110 lb 3.7 oz)   10/02/23 47.6 kg (105 lb)   10/06/22 44 kg (97 lb)       PHYSICAL EXAM:  Physical Exam  Vitals and nursing note reviewed.   Constitutional:       General: She is not in acute distress.  HENT:      Head: Normocephalic and atraumatic.      Mouth/Throat:      Mouth: Mucous membranes are dry.      Pharynx: Oropharynx is clear.   Eyes:      General: No scleral icterus.     Extraocular Movements: Extraocular movements intact.   Cardiovascular:      Rate and Rhythm: Normal rate.      Pulses: Normal pulses.   Pulmonary:      Breath sounds: Wheezing (Improved from admission, likely at baseline) present. No rales (Improved air movement with less crackles b/l).   Abdominal:      General: Abdomen is flat.      Palpations: Abdomen is soft.   Musculoskeletal:      Right lower leg: No edema.      Left lower leg: No edema.   Skin:     General: Skin is warm and dry.   Neurological:      Mental Status: She is alert and oriented to person, place, and time. Mental status is at baseline.   Psychiatric:         Mood and Affect: Mood normal.         Behavior: Behavior normal.          DISCHARGE DISPOSITION   Stable       Discharge Medications        New Medications        Instructions Start Date   benzonatate 100 MG capsule  Commonly known as: TESSALON   100 mg, Oral, 3 Times Daily PRN      cefdinir 300 MG capsule  Commonly known as: OMNICEF   300 mg, Oral, 2 Times Daily      predniSONE 20 MG tablet  Commonly known as: DELTASONE   40 mg, Oral, Daily             Continue These Medications        Instructions Start Date   albuterol  sulfate  (90 Base) MCG/ACT inhaler  Commonly known as: PROVENTIL HFA;VENTOLIN HFA;PROAIR HFA   2 puffs, Inhalation, Every 6 Hours PRN      albuterol (2.5 MG/3ML) 0.083% nebulizer solution  Commonly known as: PROVENTIL   2.5 mg, Nebulization, Every 6 Hours PRN      ALPRAZolam 1 MG tablet  Commonly known as: XANAX   1 mg, Oral, 2 Times Daily      Anoro Ellipta 62.5-25 MCG/ACT aerosol powder  inhaler  Generic drug: Umeclidinium-Vilanterol   1 puff, Inhalation, Daily - RT      aspirin 81 MG EC tablet   81 mg, Oral, Daily      atorvastatin 20 MG tablet  Commonly known as: LIPITOR   20 mg, Oral, Daily      gabapentin 800 MG tablet  Commonly known as: NEURONTIN   800 mg, Oral, 3 Times Daily PRN      HYDROcodone-acetaminophen  MG per tablet  Commonly known as: NORCO   1 tablet, Oral, Every 6 Hours PRN      lisinopril 10 MG tablet  Commonly known as: PRINIVIL,ZESTRIL   10 mg, Oral, Daily      meloxicam 7.5 MG tablet  Commonly known as: MOBIC   7.5 mg, Oral, Daily      omeprazole 20 MG capsule  Commonly known as: priLOSEC   20 mg, Oral, 2 Times Daily PRN      oxybutynin 5 MG tablet  Commonly known as: DITROPAN   5 mg, Oral, 2 Times Daily               Diet Instructions    Regular diet           Activity Instructions    As tolerated           Additional Instructions for the Follow-ups that You Need to Schedule       Discharge Follow-up with PCP   As directed       Currently Documented PCP:    Johann Juárez MD    PCP Phone Number:    [phone]     Follow Up Details: Within 2 weeks               Follow-up Information       Johann Juárez MD .    Specialty: Internal Medicine  Why: Within 2 weeks  Contact information:  [address] [place], [place] [id number] [phone]               Johann Juárez MD .    Specialty: Internal Medicine  Contact information:  [address] [place], [place] [id number] [phone]                              TEST  RESULTS PENDING AT DISCHARGE      I  will notify patient via phone-call should above lab work result with any significant abnormal findings     CODE STATUS  Code Status and Medical Interventions:   Ordered at: 10/26/23 0324     Code Status (Patient has no pulse and is not breathing):    CPR (Attempt to Resuscitate)     Medical Interventions (Patient has pulse or is breathing):    Full Support       The ASCVD Risk score (Richard BELL, et al., 2019) failed to calculate for the following reasons:    The valid total cholesterol range is 130 to 320 mg/dL       Leroy Mosher MD  [facility]  10/31/23  10:12 EDT    Please note that this discharge summary required more than 30 minutes to complete.

## 2023-10-31 NOTE — PROGRESS NOTES
"Enter Query Response Below      Query Response: No              If applicable, please update the problem list.   Patient: Dorothy Avendano        : 1950  Account: [id number]           Admit Date: 10/25/2023        How to Respond to this query:       a. Click New Note     b. Answer query within the yellow box.                c. Update the Problem List, if applicable.      If you have any questions about this query contact me at: [phone]     Dr. Mosher:    72 y.o. female with history of COPD on 2L NC, HTN, grade 1 diastolic CHF with preserved EF, presents with acute respiratory failure with hypoxemia, COPD exacerbation, left lower lobe pneumonia and sepsis.  Labs include HS Troponin T 34, 21, proBNP 2,082.  Progress note states include ALLISON.  Patient was given IV fluids, azithromycin IV 10/26-10/28, cefepime IV 10/26-10/29.  Discharge summary includes \"sepsis, BNP elevation secondary to hypoxia, ALLISON on CKD and myocardial injury.\"      Are any of this patient’s organ dysfunctions (ALLISON, acute respiratory failure with hypoxemia) secondary to sepsis?     Yes, all secondary to sepsis   Yes, but not all, please specify____________   No   Other- specify____________   Unable to determine        By submitting this query, we are merely seeking further clarification of documentation to accurately reflect all conditions that you are monitoring, evaluating, treating or that extend the hospitalization or utilize additional resources of care. Please utilize your independent clinical judgment when addressing the question(s) above.     This query and your response, once completed, will be entered into the legal medical record.    Sincerely,  Meenakshi Mckenzie RN, MSN  Clinical Documentation Integrity Program   [email or web address]     "

## 2023-11-02 ENCOUNTER — READMISSION MANAGEMENT (OUTPATIENT)
Dept: CALL CENTER | Facility: HOSPITAL | Age: 73
End: 2023-11-02
Payer: MEDICARE

## 2023-11-02 NOTE — OUTREACH NOTE
COPD/PN Week 1 Survey      Flowsheet Row Responses   [facility] patient discharged from? [facility]   Does the patient have one of the following disease processes/diagnoses(primary or secondary)? COPD   Week 1 attempt successful? Yes   Call start time 1101   Call end time 1107   List who call center can speak with pt   Medication alerts for this patient antibiotics, steroids.   Meds reviewed with patient/caregiver? Yes   Is the patient having any side effects they believe may be caused by any medication additions or changes? No   Does the patient have all medications ordered at discharge? Yes   Is the patient taking all medications as directed (includes completed medication regime)? Yes   Comments regarding appointments Pt to see pcp on November 10, 2023.   Does the patient have a primary care provider?  Yes   Has the patient kept scheduled appointments due by today? N/A   Has home health visited the patient within 72 hours of discharge? N/A   DME comments Son will purchase pulse ox on this day.   Pulse Ox monitoring None   Psychosocial issues? No   Did the patient receive a copy of their discharge instructions? Yes   Nursing interventions Reviewed instructions with patient   What is the patient's perception of their health status since discharge? Improving   Nursing Interventions Nurse provided patient education   Is the patient/caregiver able to teach back the hierarchy of who to call/visit for symptoms/problems? PCP, Specialist, Home health nurse, Urgent Care, ED, 911 Yes   Is the patient able to teach back COPD zones? Yes   Patient reports what zone on this call? Green Zone   Green Zone Breathing without shortness of breath   Green Zone interventions: Take daily medications, Use oxygen as prescribed   Week 1 call completed? Yes   Wrap up additional comments Pt reports slowly improving. Pt is using continuous 02 at this time. Pt has all medications. Son to purchase pulse ox today. Pt has pcp f/u scheduled.    Call end time 1444            Elle ZEE - Registered Nurse

## 2023-11-15 ENCOUNTER — READMISSION MANAGEMENT (OUTPATIENT)
Dept: CALL CENTER | Facility: HOSPITAL | Age: 73
End: 2023-11-15
Payer: MEDICARE

## 2023-11-15 NOTE — OUTREACH NOTE
COPD/PN Week 3 Survey      Flowsheet Row Responses   [facility]   Does the patient have one of the following disease processes/diagnoses(primary or secondary)? COPD   Week 3 attempt successful? No   Unsuccessful attempts Attempt 1   oke Ramandeep BENSON - Registered Nurse

## 2024-03-27 ENCOUNTER — APPOINTMENT (OUTPATIENT)
Dept: CT IMAGING | Facility: HOSPITAL | Age: 74
End: 2024-03-27
Payer: MEDICARE

## 2024-03-27 ENCOUNTER — HOSPITAL ENCOUNTER (EMERGENCY)
Facility: HOSPITAL | Age: 74
Discharge: HOME | End: 2024-03-27
Attending: EMERGENCY MEDICINE | Admitting: EMERGENCY MEDICINE
Payer: MEDICARE

## 2024-03-27 ENCOUNTER — APPOINTMENT (OUTPATIENT)
Dept: GENERAL RADIOLOGY | Facility: HOSPITAL | Age: 74
End: 2024-03-27
Payer: MEDICARE

## 2024-03-27 VITALS
RESPIRATION RATE: 20 BRPM | SYSTOLIC BLOOD PRESSURE: 140 MMHG | WEIGHT: 95 LBS | OXYGEN SATURATION: 94 % | DIASTOLIC BLOOD PRESSURE: 64 MMHG | TEMPERATURE: 98.1 F | HEART RATE: 80 BPM | HEIGHT: 61 IN | BODY MASS INDEX: 17.94 KG/M2

## 2024-03-27 DIAGNOSIS — S72.115A NONDISPLACED FRACTURE OF GREATER TROCHANTER OF LEFT FEMUR, INITIAL ENCOUNTER FOR CLOSED FRACTURE: Primary | ICD-10-CM

## 2024-03-27 LAB
A-A DO2: 42.7 MMHG (ref 0–300)
ARTERIAL PATENCY WRIST A: ABNORMAL
ATMOSPHERIC PRESS: 727 MMHG
BASE EXCESS BLDA CALC-SCNC: 3.5 MMOL/L (ref 0–2)
BDY SITE: ABNORMAL
CO2 BLDA-SCNC: 29.9 MMOL/L (ref 22–33)
COHGB MFR BLD: 4.8 % (ref 0–5)
HCO3 BLDA-SCNC: 28.5 MMOL/L (ref 20–26)
HCT VFR BLD CALC: 41 % (ref 38–51)
HGB BLDA-MCNC: 13.4 G/DL (ref 13.5–17.5)
INHALED O2 CONCENTRATION: 21 %
Lab: ABNORMAL
Lab: ABNORMAL
METHGB BLD QL: 0 % (ref 0–3)
MODALITY: ABNORMAL
NOTIFIED BY: ABNORMAL
NOTIFIED WHO: ABNORMAL
OXYHGB MFR BLDV: 83 % (ref 94–99)
PCO2 BLDA: 43.9 MM HG (ref 35–45)
PCO2 TEMP ADJ BLD: ABNORMAL MM[HG]
PH BLDA: 7.42 PH UNITS (ref 7.35–7.45)
PH, TEMP CORRECTED: ABNORMAL
PO2 BLDA: 50.5 MM HG (ref 83–108)
PO2 TEMP ADJ BLD: ABNORMAL MM[HG]
SAO2 % BLDCOA: 87.1 % (ref 94–99)
VENTILATOR MODE: ABNORMAL

## 2024-03-27 PROCEDURE — 72192 CT PELVIS W/O DYE: CPT

## 2024-03-27 PROCEDURE — 70450 CT HEAD/BRAIN W/O DYE: CPT | Performed by: RADIOLOGY

## 2024-03-27 PROCEDURE — 83050 HGB METHEMOGLOBIN QUAN: CPT

## 2024-03-27 PROCEDURE — 82805 BLOOD GASES W/O2 SATURATION: CPT

## 2024-03-27 PROCEDURE — 73502 X-RAY EXAM HIP UNI 2-3 VIEWS: CPT | Performed by: RADIOLOGY

## 2024-03-27 PROCEDURE — 99284 EMERGENCY DEPT VISIT MOD MDM: CPT

## 2024-03-27 PROCEDURE — 82375 ASSAY CARBOXYHB QUANT: CPT

## 2024-03-27 PROCEDURE — 36600 WITHDRAWAL OF ARTERIAL BLOOD: CPT

## 2024-03-27 PROCEDURE — 70450 CT HEAD/BRAIN W/O DYE: CPT

## 2024-03-27 PROCEDURE — 72192 CT PELVIS W/O DYE: CPT | Performed by: RADIOLOGY

## 2024-03-27 PROCEDURE — 73502 X-RAY EXAM HIP UNI 2-3 VIEWS: CPT

## 2024-03-27 NOTE — ED PROVIDER NOTES
Subjective   History of Present Illness  Mechanical trip and fall 1 week ago while running to help a relative.  Patient struck her occiput and also landed on her left hip.  She has had pain to the left hip.  It is since.  She is full weightbearing with pain.  No altered mental status no nausea no vomiting no focal or lateralizing motor or sensory speech or visual symptoms.  No other pain injury or illness.        Review of Systems   All other systems reviewed and are negative.      Past Medical History:   Diagnosis Date    Arthritis     COPD (chronic obstructive pulmonary disease)     Elevated cholesterol     High cholesterol     History of transfusion     Hyperlipidemia     Hypertension        No Known Allergies    Past Surgical History:   Procedure Laterality Date    BREAST BIOPSY Right     yrs ago benign    COLONOSCOPY      COLONOSCOPY N/A 5/10/2022    Procedure: COLONOSCOPY;  Surgeon: Kenia Braun MD;  Location: [facility];  Service: Gastroenterology;  Laterality: N/A;    ENDOSCOPY      ENDOSCOPY N/A 5/10/2022    Procedure: ESOPHAGOGASTRODUODENOSCOPY WITH BIOPSY;  Surgeon: Kenia Braun MD;  Location: [facility];  Service: Gastroenterology;  Laterality: N/A;    HIP SURGERY Right        Family History   Problem Relation Age of Onset    No Known Problems Mother     No Known Problems Father     No Known Problems Sister     No Known Problems Brother     No Known Problems Son     No Known Problems Daughter     No Known Problems Maternal Grandmother     No Known Problems Maternal Grandfather     No Known Problems Paternal Grandmother     No Known Problems Paternal Grandfather     No Known Problems Cousin     Rheum arthritis Neg Hx     Osteoarthritis Neg Hx     Asthma Neg Hx     Diabetes Neg Hx     Heart failure Neg Hx     Hyperlipidemia Neg Hx     Hypertension Neg Hx     Migraines Neg Hx     Rashes / Skin problems Neg Hx     Seizures Neg Hx     Stroke Neg Hx     Thyroid disease Neg Hx      Breast cancer Neg Hx        Social History     Socioeconomic History    Marital status:    Tobacco Use    Smoking status: Every Day     Current packs/day: 1.00     Average packs/day: 1 pack/day for 30.0 years (30.0 ttl pk-yrs)     Types: Cigarettes    Smokeless tobacco: Never   Vaping Use    Vaping status: Never Used   Substance and Sexual Activity    Alcohol use: No    Drug use: No    Sexual activity: Defer           Objective   Physical Exam  Constitutional:       General: She is not in acute distress.  HENT:      Head: Normocephalic.      Comments: Tenderness over occiput but no raised hematoma or visible hematoma no evidence of fracture to the face base of skull or vault     Right Ear: External ear normal.      Left Ear: External ear normal.      Nose: Nose normal.      Mouth/Throat:      Mouth: Mucous membranes are moist.   Eyes:      Extraocular Movements: Extraocular movements intact.   Cardiovascular:      Rate and Rhythm: Normal rate and regular rhythm.      Heart sounds: No murmur heard.  Pulmonary:      Effort: Pulmonary effort is normal.      Breath sounds: Normal breath sounds.   Abdominal:      Palpations: Abdomen is soft.      Tenderness: There is no abdominal tenderness. There is no guarding.   Musculoskeletal:      Cervical back: Neck supple.      Comments: Can fully range the left hip however there is pain on external rotation.  Otherwise the rest of the musculoskeletal exam is nontender no instability no deformity full range of movement.   Skin:     Capillary Refill: Capillary refill takes less than 2 seconds.   Neurological:      General: No focal deficit present.      Mental Status: She is alert.   Psychiatric:         Behavior: Behavior normal.         Procedures           ED Course                                             Medical Decision Making  Patient with fall with head strike and direct blow to left hip who has been full weightbearing with pain.  CT head done based on age no  acute findings.  X-ray and subsequently CT of hip shows an older inferior ramus fracture as well as an acute greater trochanter fracture.  Discussed with Dr. Merlos who agrees that the patient could be discharged with weightbearing as tolerated in a walker and will follow-up with orthopedics.    Problems Addressed:  Nondisplaced fracture of greater trochanter of left femur, initial encounter for closed fracture: complicated acute illness or injury    Amount and/or Complexity of Data Reviewed  Radiology: ordered.        Final diagnoses:   Nondisplaced fracture of greater trochanter of left femur, initial encounter for closed fracture       ED Disposition  ED Disposition       ED Disposition   Discharge    Condition   Stable    Comment   --               Mejia Richardson MD  [address] [place] [address] [address] [place] [address] [phone]    Schedule an appointment as soon as possible for a visit in 3 days           Medication List      No changes were made to your prescriptions during this visit.            Luzma Zhang MD  03/28/24 0121

## 2024-04-12 ENCOUNTER — HOSPITAL ENCOUNTER (OUTPATIENT)
Dept: GENERAL RADIOLOGY | Facility: HOSPITAL | Age: 74
Discharge: HOME | End: 2024-04-12
Payer: MEDICARE

## 2024-04-12 DIAGNOSIS — S72.115D CLOSED NONDISPLACED FRACTURE OF GREATER TROCHANTER OF LEFT FEMUR WITH ROUTINE HEALING, SUBSEQUENT ENCOUNTER: Primary | ICD-10-CM

## 2024-04-12 PROCEDURE — 73502 X-RAY EXAM HIP UNI 2-3 VIEWS: CPT

## 2024-06-14 ENCOUNTER — APPOINTMENT (OUTPATIENT)
Dept: GENERAL RADIOLOGY | Facility: HOSPITAL | Age: 74
End: 2024-06-14
Payer: MEDICARE

## 2024-06-14 ENCOUNTER — APPOINTMENT (OUTPATIENT)
Dept: CT IMAGING | Facility: HOSPITAL | Age: 74
End: 2024-06-14
Payer: MEDICARE

## 2024-06-14 ENCOUNTER — HOSPITAL ENCOUNTER (EMERGENCY)
Facility: HOSPITAL | Age: 74
Discharge: HOME | End: 2024-06-15
Attending: STUDENT IN AN ORGANIZED HEALTH CARE EDUCATION/TRAINING PROGRAM
Payer: MEDICARE

## 2024-06-14 DIAGNOSIS — J44.1 COPD EXACERBATION: Primary | ICD-10-CM

## 2024-06-14 LAB
ALBUMIN SERPL-MCNC: 3.8 G/DL (ref 3.5–5.2)
ALBUMIN/GLOB SERPL: 1.5 G/DL
ALP SERPL-CCNC: 57 U/L (ref 39–117)
ALT SERPL W P-5'-P-CCNC: 10 U/L (ref 1–33)
ANION GAP SERPL CALCULATED.3IONS-SCNC: 6.4 MMOL/L (ref 5–15)
AST SERPL-CCNC: 13 U/L (ref 1–32)
BASOPHILS # BLD AUTO: 0.06 10*3/MM3 (ref 0–0.2)
BASOPHILS NFR BLD AUTO: 0.7 % (ref 0–1.5)
BILIRUB SERPL-MCNC: <0.2 MG/DL (ref 0–1.2)
BILIRUB UR QL STRIP: NEGATIVE
BUN SERPL-MCNC: 20 MG/DL (ref 8–23)
BUN/CREAT SERPL: 24.7 (ref 7–25)
CALCIUM SPEC-SCNC: 9.1 MG/DL (ref 8.6–10.5)
CHLORIDE SERPL-SCNC: 104 MMOL/L (ref 98–107)
CLARITY UR: CLEAR
CO2 SERPL-SCNC: 28.6 MMOL/L (ref 22–29)
COLOR UR: YELLOW
CREAT SERPL-MCNC: 0.81 MG/DL (ref 0.57–1)
DEPRECATED RDW RBC AUTO: 50.4 FL (ref 37–54)
EGFRCR SERPLBLD CKD-EPI 2021: 77.2 ML/MIN/1.73
EOSINOPHIL # BLD AUTO: 0.28 10*3/MM3 (ref 0–0.4)
EOSINOPHIL NFR BLD AUTO: 3.2 % (ref 0.3–6.2)
ERYTHROCYTE [DISTWIDTH] IN BLOOD BY AUTOMATED COUNT: 14.5 % (ref 12.3–15.4)
GLOBULIN UR ELPH-MCNC: 2.6 GM/DL
GLUCOSE SERPL-MCNC: 101 MG/DL (ref 65–99)
GLUCOSE UR STRIP-MCNC: NEGATIVE MG/DL
HCT VFR BLD AUTO: 40.7 % (ref 34–46.6)
HGB BLD-MCNC: 12.3 G/DL (ref 12–15.9)
HGB UR QL STRIP.AUTO: NEGATIVE
HOLD SPECIMEN: NORMAL
HOLD SPECIMEN: NORMAL
IMM GRANULOCYTES # BLD AUTO: 0.02 10*3/MM3 (ref 0–0.05)
IMM GRANULOCYTES NFR BLD AUTO: 0.2 % (ref 0–0.5)
KETONES UR QL STRIP: NEGATIVE
LEUKOCYTE ESTERASE UR QL STRIP.AUTO: NEGATIVE
LYMPHOCYTES # BLD AUTO: 2.13 10*3/MM3 (ref 0.7–3.1)
LYMPHOCYTES NFR BLD AUTO: 24.5 % (ref 19.6–45.3)
MCH RBC QN AUTO: 28.3 PG (ref 26.6–33)
MCHC RBC AUTO-ENTMCNC: 30.2 G/DL (ref 31.5–35.7)
MCV RBC AUTO: 93.6 FL (ref 79–97)
MONOCYTES # BLD AUTO: 0.93 10*3/MM3 (ref 0.1–0.9)
MONOCYTES NFR BLD AUTO: 10.7 % (ref 5–12)
NEUTROPHILS NFR BLD AUTO: 5.28 10*3/MM3 (ref 1.7–7)
NEUTROPHILS NFR BLD AUTO: 60.7 % (ref 42.7–76)
NITRITE UR QL STRIP: NEGATIVE
NRBC BLD AUTO-RTO: 0 /100 WBC (ref 0–0.2)
NT-PROBNP SERPL-MCNC: 50.3 PG/ML (ref 0–900)
PH UR STRIP.AUTO: 5.5 [PH] (ref 5–8)
PLATELET # BLD AUTO: 183 10*3/MM3 (ref 140–450)
PMV BLD AUTO: 10.8 FL (ref 6–12)
POTASSIUM SERPL-SCNC: 4.6 MMOL/L (ref 3.5–5.2)
PROT SERPL-MCNC: 6.4 G/DL (ref 6–8.5)
PROT UR QL STRIP: NEGATIVE
RBC # BLD AUTO: 4.35 10*6/MM3 (ref 3.77–5.28)
SODIUM SERPL-SCNC: 139 MMOL/L (ref 136–145)
SP GR UR STRIP: <=1.005 (ref 1–1.03)
TROPONIN T SERPL HS-MCNC: 6 NG/L
UROBILINOGEN UR QL STRIP: NORMAL
WBC NRBC COR # BLD AUTO: 8.7 10*3/MM3 (ref 3.4–10.8)
WHOLE BLOOD HOLD COAG: NORMAL
WHOLE BLOOD HOLD SPECIMEN: NORMAL

## 2024-06-14 PROCEDURE — 94761 N-INVAS EAR/PLS OXIMETRY MLT: CPT

## 2024-06-14 PROCEDURE — 83880 ASSAY OF NATRIURETIC PEPTIDE: CPT | Performed by: STUDENT IN AN ORGANIZED HEALTH CARE EDUCATION/TRAINING PROGRAM

## 2024-06-14 PROCEDURE — 71045 X-RAY EXAM CHEST 1 VIEW: CPT | Performed by: RADIOLOGY

## 2024-06-14 PROCEDURE — 80053 COMPREHEN METABOLIC PANEL: CPT | Performed by: STUDENT IN AN ORGANIZED HEALTH CARE EDUCATION/TRAINING PROGRAM

## 2024-06-14 PROCEDURE — 94640 AIRWAY INHALATION TREATMENT: CPT

## 2024-06-14 PROCEDURE — 74177 CT ABD & PELVIS W/CONTRAST: CPT | Performed by: RADIOLOGY

## 2024-06-14 PROCEDURE — 25510000001 IOPAMIDOL PER 1 ML: Performed by: STUDENT IN AN ORGANIZED HEALTH CARE EDUCATION/TRAINING PROGRAM

## 2024-06-14 PROCEDURE — 71275 CT ANGIOGRAPHY CHEST: CPT

## 2024-06-14 PROCEDURE — 84484 ASSAY OF TROPONIN QUANT: CPT | Performed by: STUDENT IN AN ORGANIZED HEALTH CARE EDUCATION/TRAINING PROGRAM

## 2024-06-14 PROCEDURE — 94799 UNLISTED PULMONARY SVC/PX: CPT

## 2024-06-14 PROCEDURE — 85025 COMPLETE CBC W/AUTO DIFF WBC: CPT | Performed by: STUDENT IN AN ORGANIZED HEALTH CARE EDUCATION/TRAINING PROGRAM

## 2024-06-14 PROCEDURE — 74177 CT ABD & PELVIS W/CONTRAST: CPT

## 2024-06-14 PROCEDURE — 93005 ELECTROCARDIOGRAM TRACING: CPT | Performed by: STUDENT IN AN ORGANIZED HEALTH CARE EDUCATION/TRAINING PROGRAM

## 2024-06-14 PROCEDURE — 25010000002 DEXAMETHASONE SODIUM PHOSPHATE 10 MG/ML SOLUTION: Performed by: NURSE PRACTITIONER

## 2024-06-14 PROCEDURE — 81003 URINALYSIS AUTO W/O SCOPE: CPT | Performed by: STUDENT IN AN ORGANIZED HEALTH CARE EDUCATION/TRAINING PROGRAM

## 2024-06-14 PROCEDURE — 99285 EMERGENCY DEPT VISIT HI MDM: CPT

## 2024-06-14 PROCEDURE — 71275 CT ANGIOGRAPHY CHEST: CPT | Performed by: RADIOLOGY

## 2024-06-14 PROCEDURE — 71045 X-RAY EXAM CHEST 1 VIEW: CPT

## 2024-06-14 PROCEDURE — 96375 TX/PRO/DX INJ NEW DRUG ADDON: CPT

## 2024-06-14 RX ORDER — SODIUM CHLORIDE 0.9 % (FLUSH) 0.9 %
10 SYRINGE (ML) INJECTION AS NEEDED
Status: DISCONTINUED | OUTPATIENT
Start: 2024-06-14 | End: 2024-06-15 | Stop reason: HOSPADM

## 2024-06-14 RX ORDER — DEXAMETHASONE SODIUM PHOSPHATE 10 MG/ML
10 INJECTION, SOLUTION INTRAMUSCULAR; INTRAVENOUS ONCE
Status: COMPLETED | OUTPATIENT
Start: 2024-06-14 | End: 2024-06-14

## 2024-06-14 RX ORDER — IPRATROPIUM BROMIDE AND ALBUTEROL SULFATE 2.5; .5 MG/3ML; MG/3ML
3 SOLUTION RESPIRATORY (INHALATION) ONCE
Status: COMPLETED | OUTPATIENT
Start: 2024-06-14 | End: 2024-06-14

## 2024-06-14 RX ADMIN — IOPAMIDOL 70 ML: 755 INJECTION, SOLUTION INTRAVENOUS at 23:22

## 2024-06-14 RX ADMIN — IPRATROPIUM BROMIDE AND ALBUTEROL SULFATE 3 ML: 2.5; .5 SOLUTION RESPIRATORY (INHALATION) at 23:14

## 2024-06-14 RX ADMIN — DEXAMETHASONE SODIUM PHOSPHATE 10 MG: 10 INJECTION INTRAMUSCULAR; INTRAVENOUS at 23:21

## 2024-06-15 VITALS
OXYGEN SATURATION: 94 % | RESPIRATION RATE: 22 BRPM | SYSTOLIC BLOOD PRESSURE: 96 MMHG | TEMPERATURE: 98.5 F | DIASTOLIC BLOOD PRESSURE: 59 MMHG | BODY MASS INDEX: 18.12 KG/M2 | WEIGHT: 96 LBS | HEART RATE: 73 BPM | HEIGHT: 61 IN

## 2024-06-15 LAB — D-LACTATE SERPL-SCNC: 1 MMOL/L (ref 0.5–2)

## 2024-06-15 PROCEDURE — 83605 ASSAY OF LACTIC ACID: CPT | Performed by: NURSE PRACTITIONER

## 2024-06-15 PROCEDURE — 87040 BLOOD CULTURE FOR BACTERIA: CPT | Performed by: NURSE PRACTITIONER

## 2024-06-15 PROCEDURE — 96365 THER/PROPH/DIAG IV INF INIT: CPT

## 2024-06-15 RX ORDER — DOXYCYCLINE 100 MG/1
100 CAPSULE ORAL 2 TIMES DAILY
Qty: 20 CAPSULE | Refills: 0 | Status: SHIPPED | OUTPATIENT
Start: 2024-06-15 | End: 2024-06-25

## 2024-06-15 RX ORDER — METHYLPREDNISOLONE 4 MG/1
TABLET ORAL
Qty: 21 TABLET | Refills: 0 | Status: SHIPPED | OUTPATIENT
Start: 2024-06-15

## 2024-06-15 RX ADMIN — DOXYCYCLINE 100 MG: 100 INJECTION, POWDER, LYOPHILIZED, FOR SOLUTION INTRAVENOUS at 00:27

## 2024-06-15 NOTE — DISCHARGE INSTRUCTIONS
7 cm rounded mass abutting the right diaphragm d/w patient. Patient verbalized understanding to follow up.

## 2024-06-16 LAB
QT INTERVAL: 394 MS
QTC INTERVAL: 431 MS

## 2024-06-16 NOTE — ED PROVIDER NOTES
"Subjective   History of Present Illness  Patient is a 73-year-old female with significant past medical history positive for COPD, hyperlipidemia, hypertension presenting to the ER for evaluation of shortness of breath. Patient states, \"I have been having severe shortness of breath x5 days. I have COPD. I am having a hard time breathing. I also have been having trouble urinating. I am wearing my oxygen 3 LPM n/c but it is not helping.\"  Patient denies any known sick contacts or recent foreign travel.  Patient denies productive cough.  Patient denies any chest pain, nausea, vomiting, diarrhea or any additional symptoms today.    History provided by:  Patient   used: No        Review of Systems   Constitutional: Negative.  Negative for fever.   HENT: Negative.     Respiratory:  Positive for shortness of breath.    Cardiovascular: Negative.  Negative for chest pain.   Gastrointestinal: Negative.  Negative for abdominal pain.   Endocrine: Negative.    Genitourinary: Negative.  Negative for dysuria.   Skin: Negative.    Neurological: Negative.    Psychiatric/Behavioral: Negative.     All other systems reviewed and are negative.      Past Medical History:   Diagnosis Date    Arthritis     COPD (chronic obstructive pulmonary disease)     Elevated cholesterol     High cholesterol     History of transfusion     Hyperlipidemia     Hypertension        No Known Allergies    Past Surgical History:   Procedure Laterality Date    BREAST BIOPSY Right     yrs ago benign    COLONOSCOPY      COLONOSCOPY N/A 5/10/2022    Procedure: COLONOSCOPY;  Surgeon: Kenia Braun MD;  Location: [facility];  Service: Gastroenterology;  Laterality: N/A;    ENDOSCOPY      ENDOSCOPY N/A 5/10/2022    Procedure: ESOPHAGOGASTRODUODENOSCOPY WITH BIOPSY;  Surgeon: Kenia Braun MD;  Location: [facility] OR;  Service: Gastroenterology;  Laterality: N/A;    HIP SURGERY Right        Family History   Problem Relation Age " of Onset    No Known Problems Mother     No Known Problems Father     No Known Problems Sister     No Known Problems Brother     No Known Problems Son     No Known Problems Daughter     No Known Problems Maternal Grandmother     No Known Problems Maternal Grandfather     No Known Problems Paternal Grandmother     No Known Problems Paternal Grandfather     No Known Problems Cousin     Rheum arthritis Neg Hx     Osteoarthritis Neg Hx     Asthma Neg Hx     Diabetes Neg Hx     Heart failure Neg Hx     Hyperlipidemia Neg Hx     Hypertension Neg Hx     Migraines Neg Hx     Rashes / Skin problems Neg Hx     Seizures Neg Hx     Stroke Neg Hx     Thyroid disease Neg Hx     Breast cancer Neg Hx        Social History     Socioeconomic History    Marital status:    Tobacco Use    Smoking status: Every Day     Current packs/day: 1.00     Average packs/day: 1 pack/day for 30.0 years (30.0 ttl pk-yrs)     Types: Cigarettes    Smokeless tobacco: Never   Vaping Use    Vaping status: Never Used   Substance and Sexual Activity    Alcohol use: No    Drug use: No    Sexual activity: Defer           Objective   Physical Exam  Vitals and nursing note reviewed.   Constitutional:       General: She is not in acute distress.     Appearance: She is well-developed. She is ill-appearing. She is not diaphoretic.      Comments: Chronic ill-appearing   HENT:      Head: Normocephalic and atraumatic.      Right Ear: External ear normal.      Left Ear: External ear normal.      Nose: Nose normal.   Eyes:      Conjunctiva/sclera: Conjunctivae normal.      Pupils: Pupils are equal, round, and reactive to light.   Neck:      Vascular: No JVD.      Trachea: No tracheal deviation.   Cardiovascular:      Rate and Rhythm: Normal rate and regular rhythm.      Heart sounds: Normal heart sounds. No murmur heard.  Pulmonary:      Effort: Pulmonary effort is normal. No respiratory distress.      Breath sounds: Decreased breath sounds and rhonchi present.  No wheezing.   Abdominal:      General: Bowel sounds are normal.      Palpations: Abdomen is soft.      Tenderness: There is no abdominal tenderness.   Musculoskeletal:         General: No deformity. Normal range of motion.      Cervical back: Normal range of motion and neck supple.   Skin:     General: Skin is warm and dry.      Capillary Refill: Capillary refill takes 2 to 3 seconds.      Coloration: Skin is not pale.      Findings: No erythema or rash.   Neurological:      Mental Status: She is alert and oriented to person, place, and time.      Cranial Nerves: No cranial nerve deficit.   Psychiatric:         Behavior: Behavior normal.         Thought Content: Thought content normal.         Procedures       Results for orders placed or performed during the hospital encounter of 06/14/24   Comprehensive Metabolic Panel    Specimen: Blood   Result Value Ref Range    Glucose 101 (H) 65 - 99 mg/dL    BUN 20 8 - 23 mg/dL    Creatinine 0.81 0.57 - 1.00 mg/dL    Sodium 139 136 - 145 mmol/L    Potassium 4.6 3.5 - 5.2 mmol/L    Chloride 104 98 - 107 mmol/L    CO2 28.6 22.0 - 29.0 mmol/L    Calcium 9.1 8.6 - 10.5 mg/dL    Total Protein 6.4 6.0 - 8.5 g/dL    Albumin 3.8 3.5 - 5.2 g/dL    ALT (SGPT) 10 1 - 33 U/L    AST (SGOT) 13 1 - 32 U/L    Alkaline Phosphatase 57 39 - 117 U/L    Total Bilirubin <0.2 0.0 - 1.2 mg/dL    Globulin 2.6 gm/dL    A/G Ratio 1.5 g/dL    BUN/Creatinine Ratio 24.7 7.0 - 25.0    Anion Gap 6.4 5.0 - 15.0 mmol/L    eGFR 77.2 >60.0 mL/min/1.73   BNP    Specimen: Blood   Result Value Ref Range    proBNP 50.3 0.0 - 900.0 pg/mL   Single High Sensitivity Troponin T    Specimen: Blood   Result Value Ref Range    HS Troponin T 6 <14 ng/L   CBC Auto Differential    Specimen: Blood   Result Value Ref Range    WBC 8.70 3.40 - 10.80 10*3/mm3    RBC 4.35 3.77 - 5.28 10*6/mm3    Hemoglobin 12.3 12.0 - 15.9 g/dL    Hematocrit 40.7 34.0 - 46.6 %    MCV 93.6 79.0 - 97.0 fL    MCH 28.3 26.6 - 33.0 pg    MCHC 30.2 (L)  31.5 - 35.7 g/dL    RDW 14.5 12.3 - 15.4 %    RDW-SD 50.4 37.0 - 54.0 fl    MPV 10.8 6.0 - 12.0 fL    Platelets 183 140 - 450 10*3/mm3    Neutrophil % 60.7 42.7 - 76.0 %    Lymphocyte % 24.5 19.6 - 45.3 %    Monocyte % 10.7 5.0 - 12.0 %    Eosinophil % 3.2 0.3 - 6.2 %    Basophil % 0.7 0.0 - 1.5 %    Immature Grans % 0.2 0.0 - 0.5 %    Neutrophils, Absolute 5.28 1.70 - 7.00 10*3/mm3    Lymphocytes, Absolute 2.13 0.70 - 3.10 10*3/mm3    Monocytes, Absolute 0.93 (H) 0.10 - 0.90 10*3/mm3    Eosinophils, Absolute 0.28 0.00 - 0.40 10*3/mm3    Basophils, Absolute 0.06 0.00 - 0.20 10*3/mm3    Immature Grans, Absolute 0.02 0.00 - 0.05 10*3/mm3    nRBC 0.0 0.0 - 0.2 /100 WBC   Urinalysis With Culture If Indicated - Urine, Clean Catch    Specimen: Urine, Clean Catch   Result Value Ref Range    Color, UA Yellow Yellow, Straw    Appearance, UA Clear Clear    pH, UA 5.5 5.0 - 8.0    Specific Gravity, UA <=1.005 1.005 - 1.030    Glucose, UA Negative Negative    Ketones, UA Negative Negative    Bilirubin, UA Negative Negative    Blood, UA Negative Negative    Protein, UA Negative Negative    Leuk Esterase, UA Negative Negative    Nitrite, UA Negative Negative    Urobilinogen, UA 0.2 E.U./dL 0.2 - 1.0 E.U./dL   Lactic Acid, Plasma    Specimen: Arm, Left; Blood   Result Value Ref Range    Lactate 1.0 0.5 - 2.0 mmol/L   ECG 12 Lead ED Triage Standing Order; SOA   Result Value Ref Range    QT Interval 394 ms    QTC Interval 431 ms   Green Top (Gel)   Result Value Ref Range    Extra Tube Hold for add-ons.    Lavender Top   Result Value Ref Range    Extra Tube hold for add-on    Gold Top - SST   Result Value Ref Range    Extra Tube Hold for add-ons.    Light Blue Top   Result Value Ref Range    Extra Tube Hold for add-ons.        CT Abdomen Pelvis With Contrast   Final Result   1.  Negative for pulmonary embolism.   2.  Right diaphragmatic hernia containing portions of the liver.    3.  Minimal subsegmental atelectasis bilaterally.  "Emphysema.   4.  The common bile duct is dilated up to 1.5 cm. Follow-up with MRCP as   clinically relevant.   5.  Mild to moderate retained colonic stool.            This report was finalized on 6/14/2024 11:59 PM by Alex Pallas, DO.          CT Angiogram Chest Pulmonary Embolism   Final Result   1.  Negative for pulmonary embolism.   2.  Right diaphragmatic hernia containing portions of the liver.    3.  Minimal subsegmental atelectasis bilaterally. Emphysema.   4.  The common bile duct is dilated up to 1.5 cm. Follow-up with MRCP as   clinically relevant.   5.  Mild to moderate retained colonic stool.            This report was finalized on 6/14/2024 11:59 PM by Alex Pallas, DO.          XR Chest 1 View   Final Result   FINDINGS/IMPRESSION:     7 cm rounded mass abutting the right diaphragm the prior exam. Findings   concerning for either soft tissue mass or eventration of the right   hemidiaphragm with deeper inspiratory depth on this exam compared to   prior. Follow-up is advised.   Moderate COPD. No pleural effusion or pneumothorax. The heart is not   enlarged.   No acute fracture.           This report was finalized on 6/14/2024 10:19 PM by Alex Pallas, DO.                ED Course  ED Course as of 06/15/24 2253   Fri Jun 14, 2024   2359 XR Chest 1 View  Discussed with patient soft tissue mass patient is aware and follows up. []   2359 CT Abdomen Pelvis With Contrast []   2359 CT Angiogram Chest Pulmonary Embolism [SM]      ED Course User Index  [SM] Ana Murcia APRN                                             Medical Decision Making  Patient is a 73-year-old female with significant past medical history positive for COPD, hyperlipidemia, hypertension presenting to the ER for evaluation of shortness of breath. Patient states, \"I have been having severe shortness of breath x5 days. I have COPD. I am having a hard time breathing. I also have been having trouble urinating. I am wearing my oxygen 3 " "LPM n/c but it is not helping.\"  Patient denies any known sick contacts or recent foreign travel.  Patient denies productive cough.  Patient denies any chest pain, nausea, vomiting, diarrhea or any additional symptoms today.    Advised patient to return to the ER with new or worsening symptoms.  Advised patient to follow-up with PCP.  Patient verbalized understanding and agrees.  Vital signs are stable at discharge.  Patient is in no acute distress.    Problems Addressed:  COPD exacerbation: complicated acute illness or injury    Amount and/or Complexity of Data Reviewed  Labs: ordered.  Radiology: ordered.  ECG/medicine tests: ordered.    Risk  Prescription drug management.        Final diagnoses:   COPD exacerbation       ED Disposition  ED Disposition       ED Disposition   Discharge    Condition   Stable    Comment   --               Johann Juárez MD  [address] [place] [id number] [phone]    Schedule an appointment as soon as possible for a visit            Medication List        New Prescriptions      doxycycline 100 MG capsule  Commonly known as: MONODOX  Take 1 capsule by mouth 2 (Two) Times a Day for 10 days.     methylPREDNISolone 4 MG dose pack  Commonly known as: MEDROL  Take as directed on package instructions.               Where to Get Your Medications        These medications were sent to [place], [place] - [address] - [phone]  - [phone]   [address] [address]      Phone: [phone]   doxycycline 100 MG capsule  methylPREDNISolone 4 MG dose pack            Ana Murcia, APRN  06/15/24 0627    "

## 2024-06-20 LAB
BACTERIA SPEC AEROBE CULT: NORMAL
BACTERIA SPEC AEROBE CULT: NORMAL

## 2024-06-21 ENCOUNTER — OFFICE VISIT (OUTPATIENT)
Dept: UROLOGY | Facility: CLINIC | Age: 74
End: 2024-06-21
Payer: MEDICARE

## 2024-06-21 ENCOUNTER — TELEPHONE (OUTPATIENT)
Dept: UROLOGY | Facility: CLINIC | Age: 74
End: 2024-06-21

## 2024-06-21 VITALS
DIASTOLIC BLOOD PRESSURE: 67 MMHG | HEIGHT: 61 IN | BODY MASS INDEX: 17.48 KG/M2 | SYSTOLIC BLOOD PRESSURE: 126 MMHG | WEIGHT: 92.6 LBS | HEART RATE: 80 BPM

## 2024-06-21 DIAGNOSIS — R35.0 FREQUENCY OF URINATION: ICD-10-CM

## 2024-06-21 DIAGNOSIS — N32.81 DETRUSOR INSTABILITY OF BLADDER: Primary | ICD-10-CM

## 2024-06-21 DIAGNOSIS — N39.3 STRESS INCONTINENCE OF URINE: ICD-10-CM

## 2024-06-21 DIAGNOSIS — K59.04 CHRONIC IDIOPATHIC CONSTIPATION: ICD-10-CM

## 2024-06-21 PROCEDURE — 99214 OFFICE O/P EST MOD 30 MIN: CPT | Performed by: NURSE PRACTITIONER

## 2024-06-21 PROCEDURE — 1160F RVW MEDS BY RX/DR IN RCRD: CPT | Performed by: NURSE PRACTITIONER

## 2024-06-21 PROCEDURE — 51798 US URINE CAPACITY MEASURE: CPT | Performed by: NURSE PRACTITIONER

## 2024-06-21 PROCEDURE — 81003 URINALYSIS AUTO W/O SCOPE: CPT | Performed by: NURSE PRACTITIONER

## 2024-06-21 PROCEDURE — 1159F MED LIST DOCD IN RCRD: CPT | Performed by: NURSE PRACTITIONER

## 2024-06-21 PROCEDURE — 87086 URINE CULTURE/COLONY COUNT: CPT | Performed by: NURSE PRACTITIONER

## 2024-06-21 RX ORDER — VIBEGRON 75 MG/1
1 TABLET, FILM COATED ORAL NIGHTLY
Qty: 30 TABLET | Refills: 11 | Status: SHIPPED | OUTPATIENT
Start: 2024-06-21 | End: 2024-07-21

## 2024-06-21 NOTE — TELEPHONE ENCOUNTER
PA for Gemtesa has been sent to pt's insurance company and APPROVED!!              Approved today  The request has been approved. The authorization is effective from 06/21/2024 to 06/21/2025, as long as the member is enrolled in their current health plan. A written notification letter will follow with additional details.  Drug  Gemtesa 75MG tablets  Form  [employer] [place] Medicaid ePA Form 2017 [employer]
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
## 2024-06-22 LAB — BACTERIA SPEC AEROBE CULT: NO GROWTH

## 2024-06-25 PROBLEM — R35.0 FREQUENCY OF URINATION: Status: ACTIVE | Noted: 2024-06-25

## 2024-06-25 PROBLEM — N39.3 STRESS INCONTINENCE OF URINE: Status: ACTIVE | Noted: 2024-06-25

## 2024-06-25 PROBLEM — N32.81 DETRUSOR INSTABILITY OF BLADDER: Status: ACTIVE | Noted: 2024-06-25

## 2024-06-26 ENCOUNTER — TELEPHONE (OUTPATIENT)
Dept: UROLOGY | Facility: CLINIC | Age: 74
End: 2024-06-26
Payer: MEDICARE

## 2024-06-26 NOTE — TELEPHONE ENCOUNTER
I called pt with negative urine culture pt verbalized understanding.    ----- Message from Griselda Cheng-Akwa sent at 6/25/2024  8:17 AM EDT -----  PLEASE LET PATIENT KNOW HER URINE CULTURE IS NEGATIVE FOR ANY BACTERIA INFECTION AT THIS TIME.    Urine Culture - No growth    HOWEVER, SHE MAY DROP OFF ANOTHER URINE DIP IF YOU FEEL SYMPTOMATIC, IF NOT, INCREASE PO FLUIDS AND FOLLOW UP IN CLINIC AS DISCUSSED.    THANK YOU

## 2024-07-07 ENCOUNTER — NURSE TRIAGE (OUTPATIENT)
Dept: CALL CENTER | Facility: HOSPITAL | Age: 74
End: 2024-07-07
Payer: MEDICARE

## 2024-07-07 NOTE — TELEPHONE ENCOUNTER
"Reason for Disposition  • [1] Longstanding confusion (e.g., dementia, stroke) AND [2] worsening    Additional Information  • Negative: [1] Difficult to awaken or acting confused (e.g., disoriented, slurred speech) AND [2] present now AND [3] diabetes mellitus  • Negative: [1] Difficult to awaken or acting confused (e.g., disoriented, slurred speech) AND [2] present now AND [3] new-onset  • Negative: [1] Weakness of the face, arm, or leg on one side of the body AND [2] new-onset  • Negative: [1] Numbness of the face, arm, or leg on one side of the body AND [2] new-onset  • Negative: [1] Loss of speech or garbled speech AND [2] new-onset  • Negative: Difficulty breathing or bluish lips  • Negative: Shock suspected (e.g., cold/pale/clammy skin, too weak to stand, low BP, rapid pulse)  • Negative: Seeing, hearing, or feeling things that are not there (i.e., visual, auditory, or tactile hallucinations)  • Negative: Followed a head injury  • Negative: Drug overdose suspected  • Negative: Violent behavior, or threatening to physically hurt or kill someone  • Negative: Sounds like a life-threatening emergency to the triager  • Negative: Questions or concerns about alcohol use, unhealthy alcohol use, binge drinking, intoxication, or withdrawal  • Negative: Questions or concerns about substance use (drug use), unhealthy drug use, intoxication, or withdrawal  • Negative: [1] Diabetes mellitus AND [2] confusion from low blood sugar (i.e., < 60 mg/dl or 3.5 mmol/l)  • Negative: Headache or vomiting  • Negative: Stiff neck (can't touch chin to chest)  • Negative: Very strange or paranoid behavior  • Negative: Fever > 100.4 F (38.0 C)  • Negative: Patient sounds very sick or weak to the triager  • Negative: [1] Acting confused (e.g., disoriented, slurred speech) AND [2] brief (now gone)    Answer Assessment - Initial Assessment Questions  1. LEVEL OF CONSCIOUSNESS: \"How is he (she, the patient) acting right now?\" (e.g., " "alert-oriented, confused, lethargic, stuporous, comatose)      lethargic  2. ONSET: \"When did the confusion start?\"  (minutes, hours, days)      This am  3. PATTERN \"Does this come and go, or has it been constant since it started?\"  \"Is it present now?\"      constant  4. ALCOHOL or DRUGS: \"Has he been drinking alcohol or taking any drugs?\"       Using valuim  5. NARCOTIC MEDICINES: \"Has he been receiving any narcotic medications?\" (e.g., morphine, Vicodin)      unknown  6. CAUSE: \"What do you think is causing the confusion?\"       Not for usre  7. OTHER SYMPTOMS: \"Are there any other symptoms?\" (e.g., difficulty breathing, headache, fever, weakness)      Talking out of her head, and tired can't keep her awake    Protocols used: Confusion - Delirium-ADULT-AH    "

## 2024-08-09 ENCOUNTER — OFFICE VISIT (OUTPATIENT)
Dept: UROLOGY | Facility: CLINIC | Age: 74
End: 2024-08-09
Payer: MEDICARE

## 2024-08-09 VITALS
BODY MASS INDEX: 17.79 KG/M2 | WEIGHT: 94.2 LBS | DIASTOLIC BLOOD PRESSURE: 65 MMHG | HEART RATE: 92 BPM | HEIGHT: 61 IN | SYSTOLIC BLOOD PRESSURE: 105 MMHG

## 2024-08-09 DIAGNOSIS — R35.0 FREQUENCY OF URINATION: ICD-10-CM

## 2024-08-09 DIAGNOSIS — N39.3 STRESS INCONTINENCE OF URINE: ICD-10-CM

## 2024-08-09 DIAGNOSIS — N32.81 DETRUSOR INSTABILITY OF BLADDER: Primary | ICD-10-CM

## 2024-08-09 DIAGNOSIS — K59.04 CHRONIC IDIOPATHIC CONSTIPATION: ICD-10-CM

## 2024-08-09 LAB
BILIRUB BLD-MCNC: NEGATIVE MG/DL
CLARITY, POC: CLEAR
COLOR UR: YELLOW
EXPIRATION DATE: ABNORMAL
GLUCOSE UR STRIP-MCNC: NEGATIVE MG/DL
KETONES UR QL: ABNORMAL
LEUKOCYTE EST, POC: ABNORMAL
Lab: ABNORMAL
NITRITE UR-MCNC: NEGATIVE MG/ML
PH UR: 6 [PH] (ref 5–8)
PROT UR STRIP-MCNC: ABNORMAL MG/DL
RBC # UR STRIP: NEGATIVE /UL
SP GR UR: 1.02 (ref 1–1.03)
UROBILINOGEN UR QL: NORMAL

## 2024-08-09 PROCEDURE — 99214 OFFICE O/P EST MOD 30 MIN: CPT | Performed by: NURSE PRACTITIONER

## 2024-08-09 PROCEDURE — 81003 URINALYSIS AUTO W/O SCOPE: CPT | Performed by: NURSE PRACTITIONER

## 2024-08-09 PROCEDURE — 1160F RVW MEDS BY RX/DR IN RCRD: CPT | Performed by: NURSE PRACTITIONER

## 2024-08-09 PROCEDURE — 87086 URINE CULTURE/COLONY COUNT: CPT | Performed by: NURSE PRACTITIONER

## 2024-08-09 PROCEDURE — 1159F MED LIST DOCD IN RCRD: CPT | Performed by: NURSE PRACTITIONER

## 2024-08-09 RX ORDER — DOCUSATE SODIUM 100 MG/1
100 CAPSULE, LIQUID FILLED ORAL 2 TIMES DAILY
Qty: 60 CAPSULE | Refills: 11 | Status: SHIPPED | OUTPATIENT
Start: 2024-08-09

## 2024-08-09 NOTE — PROGRESS NOTES
Chief Complaint  Detrusor instability of bladder, Urinary Incontinence, and OAB (6 WEEKS FOLLOW UP)    Subjective          Dorothy Cespedes presents to [facility] for OAB/Detrusor instability of bladder, WITH NOCTURIA/Urinary Incontinence,   History of Present Illness   History of Present Illness  The patient is a pleasant 73-year-old female with significant debility, returns to clinic today for evaluation. This is a 6-week follow-up with prior numerous concerns including an overactive bladder/detrusor instability complicated by bouts of urinary frequency, urgency, incontinence, dysuria, pelvic pressure, and suprapubic discomfort.     The patient did report bouts of nocturia 14 to 15 times at night. She HAD failed therapy with Myrbetriq, oxybutynin, Detrol LA , and Vesicare. We did discuss another trial of Gemtesa for which she reported remarkable improvement. On clinic evaluation today, she is in no apparent discomfort, however, reports decreased urine output during the day.  Her urinalysis showed trace leukocyte esterase, no nitrites, it is negative for gross/microscopic hematuria.  Her PVR is 0 mL    OVERALL, She reports significant improvement since starting Gemtesa. She no longer experiences nocturia. However, she has experienced constipation, for which she takes half a cup of milk of magnesia approximately once a week, which provides relief. She has been drinking plenty of water. Her sleep has improved, no longer needing to wake up at night to urinate. Her insurance covers her medications. She has tried MiraLAX in the past without success. She has experienced occasional burning during urination for the past few days. She has eliminated dark soda from her diet.    Supplemental Information  She sees Dr. Blair once a month for Xanax for depression.    Active Ambulatory Problems     Diagnosis Date Noted    Weight loss 05/03/2022    Loss of appetite 05/03/2022    Acute  "respiratory failure with hypoxemia 10/26/2023    Acute respiratory failure with hypoxia and hypercapnia 10/26/2023    Detrusor instability of bladder 06/25/2024    Frequency of urination 06/25/2024    Stress incontinence of urine 06/25/2024    Chronic idiopathic constipation 08/12/2024     Resolved Ambulatory Problems     Diagnosis Date Noted    Intractable vomiting with nausea 10/01/2023     Past Medical History:   Diagnosis Date    Arthritis     COPD (chronic obstructive pulmonary disease)     Elevated cholesterol     High cholesterol     History of transfusion     Hyperlipidemia     Hypertension       Objective   Vital Signs:   /65   Pulse 92   Ht 154.9 cm (61\")   Wt 42.7 kg (94 lb 3.2 oz)   BMI 17.80 kg/m²       ROS:   Review of Systems   Constitutional:  Positive for activity change, appetite change, fatigue and unexpected weight loss. Negative for diaphoresis, fever and unexpected weight gain.   HENT:  Negative for congestion, ear discharge, ear pain, nosebleeds, rhinorrhea, sinus pressure and sore throat.    Eyes:  Negative for blurred vision, double vision, photophobia, pain, redness and visual disturbance.   Respiratory:  Positive for shortness of breath. Negative for apnea, cough, chest tightness, wheezing and stridor.    Cardiovascular:  Negative for chest pain and palpitations.   Gastrointestinal:  Positive for abdominal pain, constipation and indigestion. Negative for abdominal distention, diarrhea, nausea and vomiting.   Endocrine: Negative for polydipsia, polyphagia and polyuria.   Genitourinary:  Positive for decreased urine volume, dysuria, flank pain, frequency and pelvic pressure. Negative for difficulty urinating, hematuria and pelvic pain.   Musculoskeletal:  Positive for back pain and myalgias. Negative for arthralgias and joint swelling.   Skin:  Positive for dry skin and pallor. Negative for rash and wound.   Allergic/Immunologic: Negative for food allergies.   Neurological:  " Negative for dizziness, tremors, syncope, weakness, light-headedness, memory problem and confusion.   Hematological:  Bruises/bleeds easily.   Psychiatric/Behavioral:  Positive for stress. Negative for behavioral problems. The patient is nervous/anxious.         Physical Exam  Constitutional:       General: She is in acute distress.      Appearance: She is well-developed. She is ill-appearing.   HENT:      Head: Normocephalic and atraumatic.   Eyes:      Pupils: Pupils are equal, round, and reactive to light.   Neck:      Thyroid: No thyromegaly.      Trachea: No tracheal deviation.   Cardiovascular:      Rate and Rhythm: Normal rate and regular rhythm.      Heart sounds: No murmur heard.  Pulmonary:      Effort: Pulmonary effort is normal. Respiratory distress present.      Breath sounds: No stridor. Wheezing present.      Comments: Exertional dyspnea on 2.5L OXYGEN  Abdominal:      General: Bowel sounds are normal. There is distension.      Palpations: Abdomen is soft.      Tenderness: There is abdominal tenderness.   Genitourinary:     Labia:         Right: No tenderness.         Left: No tenderness.       Vagina: Normal. No vaginal discharge.      Comments: Intermittent dysuria, atrophic vaginitis  Musculoskeletal:         General: Tenderness present. No deformity. Normal range of motion.      Cervical back: Normal range of motion.   Skin:     General: Skin is warm and dry.      Capillary Refill: Capillary refill takes less than 2 seconds.      Coloration: Skin is not pale.      Findings: No erythema or rash.   Neurological:      Mental Status: She is alert and oriented to person, place, and time.      Cranial Nerves: No cranial nerve deficit.      Sensory: No sensory deficit.      Coordination: Coordination normal.   Psychiatric:         Behavior: Behavior normal.         Thought Content: Thought content normal.         Judgment: Judgment normal.        Physical Exam    Result Review :               6/14/2024     23:01 6/21/2024    11:53 8/9/2024    11:20   Urinalysis   Specific Gravity, UA <=1.005      Ketones, UA Negative  Negative  Trace    Blood, UA Negative      Leukocytes, UA Negative  Negative  Trace    Nitrite, UA Negative        Urine Culture          6/21/2024    11:53 8/9/2024    11:16   Urine Culture   Urine Culture No growth  No growth           Assessment and Plan    Problem List Items Addressed This Visit          Gastrointestinal Abdominal     Chronic idiopathic constipation    Relevant Medications    docusate sodium (Colace) 100 MG capsule       Genitourinary and Reproductive     Detrusor instability of bladder - Primary    Relevant Orders    POC Urinalysis Dipstick, Automated (Completed)    Urine Culture - Urine, Urine, Random Void (Completed)    Frequency of urination    Relevant Orders    POC Urinalysis Dipstick, Automated (Completed)    Urine Culture - Urine, Urine, Random Void (Completed)    Stress incontinence of urine       Assessment & Plan                                                          ASSESSMENT           Overactive Bladder/detrusor instability with mixed urinary Incontinence/IBS-see   Ms. Octavio Cespedes is a very pleasant 73-year-old male patient evaluated in clinic today.  This is her 6-week follow-up with mixed urinary incontinence symptoms that have been ongoing, did improve since starting anticholinergics but depleted her samples and has been lost to follow-up until recently.      Recently she has been in apparent discomfort, when evaluated 6 weeks ago, with worsening urinary symptoms she reports a now gradually becoming very bothersome to her-resolved since starting Gemtesa.  Patient is very pleased.  Prior to utilizing Gemtesa, she had failed multiple therapy in the last 5 years, most recently she was REstarted on  oxybutynin 5 mg twice daily by her PCP, with minimal benefits, then Vesicare, and later Detrol LA or with minimal improvement.       she was started on GEMTESSA 75MG  last clinic visit, returns to clinic today very happy.  Reports her urinary frequency has improved significantly, and her NIGHTMARE with nocturia went from 15 times a night to only 1 time at night in the last 4 weeks.  She does not have recurrent UTIs, and denies any episodes of dysuria, burning on urination, or gross hematuria.  Her urine dipstick today is  completely negative for any infection, it is negative for gross/microscopic hematuria.  PVR is 0 CC.      Again, we discussed treatable and non-treatable causes of both stress and urge urinary incontinence. With regards to stress urinary incontinence we discussed its relationship to childbirth and pelvic health.  We discussed the grading of stress incontinence with trying to quantitate the number of pads used.  We talked about leaking urine with laughing, lifting, coughing, and sexual intercourse.       Talked about the Urge component and the concept of mixed incontinence where upon the stress is treatable, but the urge may exist and that 50% of the time the urge will resolve with treatment of the stress incontinence.  We talked with the diagnostic workup including a postvoid residual urine, OR even a simple cystometrogram.  I discussed the findings that may be neurologically related including commonly seen with multiple sclerosis, Parkinson's disease, and stroke.  I talked about the various therapeutic options including anticholinergics, beta 3 agonists, and alpha blockade if there is a component of obstruction.  I discussed the side effects of anti-cholinergic including dry mouth, double vision.     Anticholinergic medication:  I talked about the various therapeutic options including anticholinergics, beta 3 agonists, and alpha blockade if there is a component of obstruction. I discussed the side effects of anti-cholinergic including dry mouth, double vision. HOWEVER, we are recommending the use of an anticholinergic. AGAIN,  We discussed the class of drugs.   We discussed the older drug such as oxybutynin with his increased spectrum of side effects such as dry mouth, dry eyes constipation versus the newer medications with lower side effects but more difficult to obtain via insurance and much more expensive finally the newest class of drugs which is Myrbetriq which has a very favorable side effect profile but unfortunately is prohibitively expensive and oftentimes requires precertification of which may be unsuccessful in many other cases     IBS- Patient has significant irritable bowel syndrome, characterized by extreme amounts of constipation.  We spoke about the impact of this on bladder function.  We spoke about  its relationship to recurrent urinary tract infections.  We discussed the need for increasing p.o. fluid intake to at least 2 to 3 L of water daily, and discussed the physiology of colonic motility as well as use of MiraLAX as a bulk laxative versus the newer class of serotonin uptake blockers such as Linzess.  We stressed the need for a daily bowel movement and discussed the Becker stool scale at length. We also discussed a referral to the GI nurse practitioner                                          PLAN     We REsent her urine for culture, due to concerns of frequency, urgency, pelvic pain and pressure.  I will call her with results if any positive bacterial growth.     CONTINUE GEMTESSA 75MG NIGHTLY.-REFILLS  Sent to pharmacy, PA-approved     Discussed things she can do to help such as her weight control, keeping a bladder diary with strict intake and output of what she eats or drinks, how often she urinates, how much she urinates.       She should follow a timed voiding bladder training program, such as limiting the amount of time between bathroom breaks, using the bathroom at regular intervals such as every 2-3 hours.  And using techniques to suppress bladder urges.       Also discussed pelvic floor muscle exercises, and do Keagle exercises to  strengthen the muscles to help control urination.     We will follow-up in ONE month to, Evaluate medication effectiveness/progress     She may return sooner if need be     Patient is agreeable plan of care.    1. Overactive bladder/detrusor instability/CASSANDRA-SUMMARY.  A refill was sent to the patient's pharmacy for Gemtesa, which she reports is working well. I encouraged her to increase her oral fluid intake and avoid bladder irritants such as caffeine products.    2. Chronic idiopathic constipation.  I also prescribed stool softeners for her chronic idiopathic constipation.    Follow-up  The patient will follow up in clinic in 1 year for refills. She may return sooner if any concerns.  Patient reports that she is not currently experiencing any symptoms of urinary incontinence.      RADIOLOGY (CT AND/OR KUB):    CT Abdomen and Pelvis: No results found for this or any previous visit.     CT Stone Protocol: No results found for this or any previous visit.     KUB: No results found for this or any previous visit.       [unfilled]  LABS (3 MONTHS):    Office Visit on 08/09/2024   Component Date Value Ref Range Status    Color 08/09/2024 Yellow  Yellow, Straw, Dark Yellow, Emma Final    Clarity, UA 08/09/2024 Clear  Clear Final    Specific Gravity  08/09/2024 1.020  1.005 - 1.030 Final    pH, Urine 08/09/2024 6.0  5.0 - 8.0 Final    Leukocytes 08/09/2024 Trace (A)  Negative Final    Nitrite, UA 08/09/2024 Negative  Negative Final    Protein, POC 08/09/2024 1+ (A)  Negative mg/dL Final    Glucose, UA 08/09/2024 Negative  Negative mg/dL Final    Ketones, UA 08/09/2024 Trace (A)  Negative Final    Urobilinogen, UA 08/09/2024 Normal  Normal, 0.2 E.U./dL Final    Bilirubin 08/09/2024 Negative  Negative Final    Blood, UA 08/09/2024 Negative  Negative Final    Lot Number 08/09/2024 n   Final    Expiration Date 08/09/2024 n   Final    Urine Culture 08/09/2024 No growth   Final   Office Visit on 06/21/2024   Component Date  Value Ref Range Status    Color 06/21/2024 Yellow  Yellow, Straw, Dark Yellow, Emma Final    Clarity, UA 06/21/2024 Clear  Clear Final    Specific Gravity  06/21/2024 1.015  1.005 - 1.030 Final    pH, Urine 06/21/2024 6.0  5.0 - 8.0 Final    Leukocytes 06/21/2024 Negative  Negative Final    Nitrite, UA 06/21/2024 Negative  Negative Final    Protein, POC 06/21/2024 Negative  Negative mg/dL Final    Glucose, UA 06/21/2024 Negative  Negative mg/dL Final    Ketones, UA 06/21/2024 Negative  Negative Final    Urobilinogen, UA 06/21/2024 Normal  Normal, 0.2 E.U./dL Final    Bilirubin 06/21/2024 Negative  Negative Final    Blood, UA 06/21/2024 Negative  Negative Final    Lot Number 06/21/2024 n   Final    Expiration Date 06/21/2024 n   Final    Urine Culture 06/21/2024 No growth   Final   Admission on 06/14/2024, Discharged on 06/15/2024   Component Date Value Ref Range Status    QT Interval 06/14/2024 394  ms Final    QTC Interval 06/14/2024 431  ms Final    Glucose 06/14/2024 101 (H)  65 - 99 mg/dL Final    BUN 06/14/2024 20  8 - 23 mg/dL Final    Creatinine 06/14/2024 0.81  0.57 - 1.00 mg/dL Final    Sodium 06/14/2024 139  136 - 145 mmol/L Final    Potassium 06/14/2024 4.6  3.5 - 5.2 mmol/L Final    Chloride 06/14/2024 104  98 - 107 mmol/L Final    CO2 06/14/2024 28.6  22.0 - 29.0 mmol/L Final    Calcium 06/14/2024 9.1  8.6 - 10.5 mg/dL Final    Total Protein 06/14/2024 6.4  6.0 - 8.5 g/dL Final    Albumin 06/14/2024 3.8  3.5 - 5.2 g/dL Final    ALT (SGPT) 06/14/2024 10  1 - 33 U/L Final    AST (SGOT) 06/14/2024 13  1 - 32 U/L Final    Alkaline Phosphatase 06/14/2024 57  39 - 117 U/L Final    Total Bilirubin 06/14/2024 <0.2  0.0 - 1.2 mg/dL Final    Globulin 06/14/2024 2.6  gm/dL Final    A/G Ratio 06/14/2024 1.5  g/dL Final    BUN/Creatinine Ratio 06/14/2024 24.7  7.0 - 25.0 Final    Anion Gap 06/14/2024 6.4  5.0 - 15.0 mmol/L Final    eGFR 06/14/2024 77.2  >60.0 mL/min/1.73 Final    proBNP 06/14/2024 50.3  0.0 - 900.0  pg/mL Final    HS Troponin T 06/14/2024 6  <14 ng/L Final    Extra Tube 06/14/2024 Hold for add-ons.   Final    Auto resulted.    Extra Tube 06/14/2024 hold for add-on   Final    Auto resulted    Extra Tube 06/14/2024 Hold for add-ons.   Final    Auto resulted.    Extra Tube 06/14/2024 Hold for add-ons.   Final    Auto resulted    WBC 06/14/2024 8.70  3.40 - 10.80 10*3/mm3 Final    RBC 06/14/2024 4.35  3.77 - 5.28 10*6/mm3 Final    Hemoglobin 06/14/2024 12.3  12.0 - 15.9 g/dL Final    Hematocrit 06/14/2024 40.7  34.0 - 46.6 % Final    MCV 06/14/2024 93.6  79.0 - 97.0 fL Final    MCH 06/14/2024 28.3  26.6 - 33.0 pg Final    MCHC 06/14/2024 30.2 (L)  31.5 - 35.7 g/dL Final    RDW 06/14/2024 14.5  12.3 - 15.4 % Final    RDW-SD 06/14/2024 50.4  37.0 - 54.0 fl Final    MPV 06/14/2024 10.8  6.0 - 12.0 fL Final    Platelets 06/14/2024 183  140 - 450 10*3/mm3 Final    Neutrophil % 06/14/2024 60.7  42.7 - 76.0 % Final    Lymphocyte % 06/14/2024 24.5  19.6 - 45.3 % Final    Monocyte % 06/14/2024 10.7  5.0 - 12.0 % Final    Eosinophil % 06/14/2024 3.2  0.3 - 6.2 % Final    Basophil % 06/14/2024 0.7  0.0 - 1.5 % Final    Immature Grans % 06/14/2024 0.2  0.0 - 0.5 % Final    Neutrophils, Absolute 06/14/2024 5.28  1.70 - 7.00 10*3/mm3 Final    Lymphocytes, Absolute 06/14/2024 2.13  0.70 - 3.10 10*3/mm3 Final    Monocytes, Absolute 06/14/2024 0.93 (H)  0.10 - 0.90 10*3/mm3 Final    Eosinophils, Absolute 06/14/2024 0.28  0.00 - 0.40 10*3/mm3 Final    Basophils, Absolute 06/14/2024 0.06  0.00 - 0.20 10*3/mm3 Final    Immature Grans, Absolute 06/14/2024 0.02  0.00 - 0.05 10*3/mm3 Final    nRBC 06/14/2024 0.0  0.0 - 0.2 /100 WBC Final    Color, UA 06/14/2024 Yellow  Yellow, Straw Final    Appearance, UA 06/14/2024 Clear  Clear Final    pH, UA 06/14/2024 5.5  5.0 - 8.0 Final    Specific Gravity, UA 06/14/2024 <=1.005  1.005 - 1.030 Final    Glucose, UA 06/14/2024 Negative  Negative Final    Ketones, UA 06/14/2024 Negative  Negative  Final    Bilirubin, UA 06/14/2024 Negative  Negative Final    Blood, UA 06/14/2024 Negative  Negative Final    Protein, UA 06/14/2024 Negative  Negative Final    Leuk Esterase, UA 06/14/2024 Negative  Negative Final    Nitrite, UA 06/14/2024 Negative  Negative Final    Urobilinogen, UA 06/14/2024 0.2 E.U./dL  0.2 - 1.0 E.U./dL Final    Lactate 06/15/2024 1.0  0.5 - 2.0 mmol/L Final    Blood Culture 06/15/2024 No growth at 5 days   Final    Blood Culture 06/15/2024 No growth at 5 days   Final        Follow Up   Return in about 1 year (around 8/9/2025) for Next scheduled follow up, RECURRENT UTI/DYSURIA/DETRUSSOR INSTABILITY/OAB-MED REFILS.    Patient was given instructions and counseling regarding her condition or for health maintenance advice. Please see specific information pulled into the AVS if appropriate.          This document has been electronically signed by Griselda Cheng-Akwa, APRN   August 12, 2024 20:42 EDT      Dictated Utilizing Dragon Dictation: Part of this note may be an electronic transcription/translation of spoken language to printed text using the Dragon Dictation System.      Patient or patient representative verbalized consent for the use of Ambient Listening during the visit with  Griselda Cheng-Akwa, APRN for chart documentation. 8/12/2024  13:30 EDT

## 2024-08-11 LAB — BACTERIA SPEC AEROBE CULT: NO GROWTH

## 2024-08-12 PROBLEM — K59.04 CHRONIC IDIOPATHIC CONSTIPATION: Status: ACTIVE | Noted: 2024-08-12

## 2024-08-19 ENCOUNTER — TELEPHONE (OUTPATIENT)
Dept: UROLOGY | Facility: CLINIC | Age: 74
End: 2024-08-19
Payer: MEDICARE

## 2024-08-19 NOTE — TELEPHONE ENCOUNTER
I called pt with negative urine culture pt verbalized understanding.    ----- Message from Griselda Cheng-Akwa sent at 8/11/2024  4:48 AM EDT -----  Please let patient know urine culture results showed NO GROWTH isolated, otherwise are negative for any bacterial infection at this time.     Urine Culture -NORMAL NO GROWTH     However she may continue antibiotic suppressive therapy ONE TIME DAILY OR NIGHTLY ONLY IF INDICATED     If not increase p.o. fluid intake and follow-up in clinic as discussed     THANK YOU

## 2024-09-05 ENCOUNTER — TELEPHONE (OUTPATIENT)
Dept: UROLOGY | Facility: CLINIC | Age: 74
End: 2024-09-05
Payer: MEDICARE

## 2024-09-05 DIAGNOSIS — N39.3 STRESS INCONTINENCE OF URINE: ICD-10-CM

## 2024-09-05 DIAGNOSIS — N32.81 DETRUSOR INSTABILITY OF BLADDER: Primary | ICD-10-CM

## 2024-09-05 DIAGNOSIS — R35.0 FREQUENCY OF URINATION: ICD-10-CM

## 2024-09-05 RX ORDER — OXYBUTYNIN CHLORIDE 10 MG/1
10 TABLET, EXTENDED RELEASE ORAL NIGHTLY
Qty: 30 TABLET | Refills: 6 | Status: SHIPPED | OUTPATIENT
Start: 2024-09-05 | End: 2024-10-05

## 2024-09-05 NOTE — TELEPHONE ENCOUNTER
Routing to [employer]-- [facility] Medical message:    DRE-DID SPEAK WITH PATIENT, HER CMP IS GOOD WITH eGFR>70 AND ASSURED PATIENT HER KIDNEY FUNCTION IS OK AND SHE DOES NOT HAVE CKD.     SHE IS CONCERNED MARTINA IS WORKING TOO WELL. SHE DOES NOT GET UP AT [facility]  AGAIN BUT HAS HESITANCY DURING THE DAY. ADVISED PATIENT TO STOP MEDS, WILL START OXYBUTYNIN 10MG XL.     PATIENT WILL FOLLOW UP IN CLINIC AS DISCUSSED.   THANK YOU

## 2024-09-05 NOTE — TELEPHONE ENCOUNTER
Caller: Dorothy Cespedes     Relationship: [unfilled] SELF    Best call back number: [phone]    What is your medical concern? PT IS CALLING REGARDING HER CARE. SHE RECEIVED A PAMPHLET ON SOMATUS FROM [address], RENAL DIETICIAN. IT IS PRETAINING TO CKD. SHE IS VERY CONCERNED.PLEASE CALL PT TO DISCUSS. THANK YOU

## 2024-10-08 ENCOUNTER — HOSPITAL ENCOUNTER (EMERGENCY)
Facility: HOSPITAL | Age: 74
Discharge: LEFT BEFORE DISCHARGE | End: 2024-10-08
Attending: EMERGENCY MEDICINE | Admitting: EMERGENCY MEDICINE
Payer: COMMERCIAL

## 2024-10-08 ENCOUNTER — APPOINTMENT (OUTPATIENT)
Dept: GENERAL RADIOLOGY | Facility: HOSPITAL | Age: 74
End: 2024-10-08
Payer: COMMERCIAL

## 2024-10-08 ENCOUNTER — APPOINTMENT (OUTPATIENT)
Dept: GENERAL RADIOLOGY | Facility: HOSPITAL | Age: 74
End: 2024-10-08
Payer: MEDICARE

## 2024-10-08 ENCOUNTER — APPOINTMENT (OUTPATIENT)
Dept: CT IMAGING | Facility: HOSPITAL | Age: 74
End: 2024-10-08
Payer: COMMERCIAL

## 2024-10-08 VITALS
DIASTOLIC BLOOD PRESSURE: 73 MMHG | WEIGHT: 96 LBS | BODY MASS INDEX: 18.12 KG/M2 | HEART RATE: 82 BPM | SYSTOLIC BLOOD PRESSURE: 159 MMHG | OXYGEN SATURATION: 95 % | HEIGHT: 61 IN | TEMPERATURE: 96.8 F | RESPIRATION RATE: 18 BRPM

## 2024-10-08 DIAGNOSIS — S39.012A STRAIN OF LUMBAR REGION, INITIAL ENCOUNTER: ICD-10-CM
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
## 2024-10-08 PROCEDURE — 73502 X-RAY EXAM HIP UNI 2-3 VIEWS: CPT | Performed by: RADIOLOGY

## 2024-10-08 PROCEDURE — 72131 CT LUMBAR SPINE W/O DYE: CPT

## 2024-10-08 PROCEDURE — 73030 X-RAY EXAM OF SHOULDER: CPT

## 2024-10-08 PROCEDURE — 73502 X-RAY EXAM HIP UNI 2-3 VIEWS: CPT

## 2024-10-08 PROCEDURE — 73030 X-RAY EXAM OF SHOULDER: CPT | Performed by: RADIOLOGY

## 2024-10-08 PROCEDURE — 99284 EMERGENCY DEPT VISIT MOD MDM: CPT

## 2024-10-08 PROCEDURE — 72131 CT LUMBAR SPINE W/O DYE: CPT | Performed by: RADIOLOGY

## 2024-10-08 RX ORDER — OXYCODONE AND ACETAMINOPHEN 10; 325 MG/1; MG/1
1 TABLET ORAL ONCE
Status: COMPLETED | OUTPATIENT
Start: 2024-10-08 | End: 2024-10-08

## 2024-10-08 RX ADMIN — OXYCODONE HYDROCHLORIDE AND ACETAMINOPHEN 1 TABLET: 10; 325 TABLET ORAL at 18:53
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
## 2025-05-21 ENCOUNTER — TRANSCRIBE ORDERS (OUTPATIENT)
Dept: ADMINISTRATIVE | Facility: HOSPITAL | Age: 75
End: 2025-05-21
Payer: MEDICARE

## 2025-05-21 DIAGNOSIS — M79.642 LEFT HAND PAIN: Primary | ICD-10-CM

## 2025-05-28 ENCOUNTER — TELEPHONE (OUTPATIENT)
Dept: UROLOGY | Facility: CLINIC | Age: 75
End: 2025-05-28
Payer: MEDICARE

## 2025-05-28 NOTE — TELEPHONE ENCOUNTER
PA for Gemtesa was sent to pt's insurance company and prior authorization is not needed as drug is not covered under the pt's prescription plan.           This medication or product is on your plan's list of covered drugs. Prior authorization is not required at this time

## 2025-06-30 ENCOUNTER — NURSE TRIAGE (OUTPATIENT)
Dept: CALL CENTER | Facility: HOSPITAL | Age: 75
End: 2025-06-30
Payer: MEDICARE

## 2025-06-30 NOTE — TELEPHONE ENCOUNTER
"170/90 is BP. Didn't feel well (eyes felt blurry) so she took her BP. States this has happened before when BP was elevated. Thinks she may have missed her morning BP meds but unsure. Still having some blurry vision but no chest pain or any other symptoms. Advised to be seen in ED. States she will have her son take her when he is available or if gets worse, will call ambulance. The BP med she believes she missed is 10 Lisinopril. Not a beta blocker and her HR is in the 70s. Advised to use her best judgement and if she believes she missed her dose this AM, okay to take. Will do so and continue to monitor and determine her own plan of care from there.     Reason for Disposition   [1] Systolic BP  >= 160 OR Diastolic >= 100 AND [2] cardiac (e.g., breathing difficulty, chest pain) or neurologic symptoms (e.g., new-onset blurred or double vision, unsteady gait)    Additional Information   Negative: Difficult to awaken or acting confused (e.g., disoriented, slurred speech)   Negative: SEVERE difficulty breathing (e.g., struggling for each breath, speaks in single words)   Negative: [1] Weakness of the face, arm or leg on one side of the body AND [2] new-onset   Negative: [1] Numbness (i.e., loss of sensation) of the face, arm or leg on one side of the body AND [2] new-onset   Negative: [1] Chest pain lasts > 5 minutes AND [2] history of heart disease (i.e., heart attack, bypass surgery, angina, angioplasty, CHF)   Negative: [1] Chest pain AND [2] took nitrogylcerin AND [3] pain was not relieved   Negative: Sounds like a life-threatening emergency to the triager   Negative: Symptom is main concern (e.g., headache, chest pain)   Negative: Low blood pressure is main concern    Answer Assessment - Initial Assessment Questions  1. BLOOD PRESSURE: \"What is the blood pressure?\" \"Did you take at least two measurements 5 minutes apart?\"      170/90 is BP. Didn't feel well (eyes felt blurry) so she took her BP.  2. ONSET: \"When did " "you take your blood pressure?\"      Prior to call   3. HOW: \"How did you take your blood pressure?\" (e.g., automatic home BP monitor, visiting nurse)      Auro cuff  4. HISTORY: \"Do you have a history of high blood pressure?\"      yes  5. MEDICINES: \"Are you taking any medicines for blood pressure?\" \"Have you missed any doses recently?\"      Thinks she may have missed this AM dose   6. OTHER SYMPTOMS: \"Do you have any symptoms?\" (e.g., blurred vision, chest pain, difficulty breathing, headache, weakness)      Blurred vision  7. PREGNANCY: \"Is there any chance you are pregnant?\" \"When was your last menstrual period?\"      na    Protocols used: Blood Pressure - High-ADULT-AH    "

## 2025-07-02 ENCOUNTER — APPOINTMENT (OUTPATIENT)
Dept: CT IMAGING | Facility: HOSPITAL | Age: 75
End: 2025-07-02
Payer: MEDICARE

## 2025-07-02 ENCOUNTER — HOSPITAL ENCOUNTER (EMERGENCY)
Facility: HOSPITAL | Age: 75
Discharge: HOME | End: 2025-07-02
Attending: STUDENT IN AN ORGANIZED HEALTH CARE EDUCATION/TRAINING PROGRAM
Payer: MEDICARE

## 2025-07-02 VITALS
DIASTOLIC BLOOD PRESSURE: 78 MMHG | HEART RATE: 72 BPM | HEIGHT: 61 IN | TEMPERATURE: 98.2 F | WEIGHT: 95 LBS | BODY MASS INDEX: 17.94 KG/M2 | OXYGEN SATURATION: 96 % | RESPIRATION RATE: 15 BRPM | SYSTOLIC BLOOD PRESSURE: 168 MMHG

## 2025-07-02 DIAGNOSIS — R11.2 NAUSEA AND VOMITING, UNSPECIFIED VOMITING TYPE: Primary | ICD-10-CM

## 2025-07-02 LAB
ALBUMIN SERPL-MCNC: 3.9 G/DL (ref 3.5–5.2)
ALBUMIN/GLOB SERPL: 1.3 G/DL
ALP SERPL-CCNC: 69 U/L (ref 39–117)
ALT SERPL W P-5'-P-CCNC: 11 U/L (ref 1–33)
ANION GAP SERPL CALCULATED.3IONS-SCNC: 13.3 MMOL/L (ref 5–15)
AST SERPL-CCNC: 20 U/L (ref 1–32)
BASOPHILS # BLD AUTO: 0.06 10*3/MM3 (ref 0–0.2)
BASOPHILS NFR BLD AUTO: 0.5 % (ref 0–1.5)
BILIRUB SERPL-MCNC: 0.3 MG/DL (ref 0–1.2)
BUN SERPL-MCNC: 8.8 MG/DL (ref 8–23)
BUN/CREAT SERPL: 14 (ref 7–25)
CALCIUM SPEC-SCNC: 9.1 MG/DL (ref 8.6–10.5)
CHLORIDE SERPL-SCNC: 92 MMOL/L (ref 98–107)
CO2 SERPL-SCNC: 22.7 MMOL/L (ref 22–29)
CREAT SERPL-MCNC: 0.63 MG/DL (ref 0.57–1)
CRP SERPL-MCNC: 0.99 MG/DL (ref 0–0.5)
DEPRECATED RDW RBC AUTO: 43.8 FL (ref 37–54)
EGFRCR SERPLBLD CKD-EPI 2021: 93.8 ML/MIN/1.73
EOSINOPHIL # BLD AUTO: 0.03 10*3/MM3 (ref 0–0.4)
EOSINOPHIL NFR BLD AUTO: 0.2 % (ref 0.3–6.2)
ERYTHROCYTE [DISTWIDTH] IN BLOOD BY AUTOMATED COUNT: 13.7 % (ref 12.3–15.4)
GLOBULIN UR ELPH-MCNC: 2.9 GM/DL
GLUCOSE SERPL-MCNC: 110 MG/DL (ref 65–99)
HCT VFR BLD AUTO: 42.1 % (ref 34–46.6)
HGB BLD-MCNC: 13.4 G/DL (ref 12–15.9)
IMM GRANULOCYTES # BLD AUTO: 0.08 10*3/MM3 (ref 0–0.05)
IMM GRANULOCYTES NFR BLD AUTO: 0.6 % (ref 0–0.5)
LYMPHOCYTES # BLD AUTO: 1.36 10*3/MM3 (ref 0.7–3.1)
LYMPHOCYTES NFR BLD AUTO: 10.9 % (ref 19.6–45.3)
MAGNESIUM SERPL-MCNC: 1.8 MG/DL (ref 1.6–2.4)
MCH RBC QN AUTO: 27.8 PG (ref 26.6–33)
MCHC RBC AUTO-ENTMCNC: 31.8 G/DL (ref 31.5–35.7)
MCV RBC AUTO: 87.3 FL (ref 79–97)
MONOCYTES # BLD AUTO: 0.87 10*3/MM3 (ref 0.1–0.9)
MONOCYTES NFR BLD AUTO: 7 % (ref 5–12)
NEUTROPHILS NFR BLD AUTO: 10.03 10*3/MM3 (ref 1.7–7)
NEUTROPHILS NFR BLD AUTO: 80.8 % (ref 42.7–76)
NRBC BLD AUTO-RTO: 0 /100 WBC (ref 0–0.2)
PLATELET # BLD AUTO: 323 10*3/MM3 (ref 140–450)
PMV BLD AUTO: 10 FL (ref 6–12)
POTASSIUM SERPL-SCNC: 5 MMOL/L (ref 3.5–5.2)
PROT SERPL-MCNC: 6.8 G/DL (ref 6–8.5)
QT INTERVAL: 404 MS
QTC INTERVAL: 451 MS
RBC # BLD AUTO: 4.82 10*6/MM3 (ref 3.77–5.28)
SODIUM SERPL-SCNC: 128 MMOL/L (ref 136–145)
WBC NRBC COR # BLD AUTO: 12.43 10*3/MM3 (ref 3.4–10.8)

## 2025-07-02 PROCEDURE — 83735 ASSAY OF MAGNESIUM: CPT | Performed by: STUDENT IN AN ORGANIZED HEALTH CARE EDUCATION/TRAINING PROGRAM

## 2025-07-02 PROCEDURE — 85025 COMPLETE CBC W/AUTO DIFF WBC: CPT | Performed by: STUDENT IN AN ORGANIZED HEALTH CARE EDUCATION/TRAINING PROGRAM

## 2025-07-02 PROCEDURE — 25010000002 ONDANSETRON PER 1 MG: Performed by: STUDENT IN AN ORGANIZED HEALTH CARE EDUCATION/TRAINING PROGRAM

## 2025-07-02 PROCEDURE — 74176 CT ABD & PELVIS W/O CONTRAST: CPT

## 2025-07-02 PROCEDURE — 86140 C-REACTIVE PROTEIN: CPT | Performed by: STUDENT IN AN ORGANIZED HEALTH CARE EDUCATION/TRAINING PROGRAM

## 2025-07-02 PROCEDURE — 25810000003 SODIUM CHLORIDE 0.9 % SOLUTION: Performed by: STUDENT IN AN ORGANIZED HEALTH CARE EDUCATION/TRAINING PROGRAM

## 2025-07-02 PROCEDURE — 36415 COLL VENOUS BLD VENIPUNCTURE: CPT

## 2025-07-02 PROCEDURE — 80053 COMPREHEN METABOLIC PANEL: CPT | Performed by: STUDENT IN AN ORGANIZED HEALTH CARE EDUCATION/TRAINING PROGRAM

## 2025-07-02 PROCEDURE — 25010000002 DROPERIDOL PER 5 MG: Performed by: PHYSICIAN ASSISTANT

## 2025-07-02 PROCEDURE — 93010 ELECTROCARDIOGRAM REPORT: CPT | Performed by: INTERNAL MEDICINE

## 2025-07-02 PROCEDURE — 25010000002 MORPHINE PER 10 MG: Performed by: STUDENT IN AN ORGANIZED HEALTH CARE EDUCATION/TRAINING PROGRAM

## 2025-07-02 PROCEDURE — 25010000002 METOCLOPRAMIDE PER 10 MG: Performed by: PHYSICIAN ASSISTANT

## 2025-07-02 PROCEDURE — 74176 CT ABD & PELVIS W/O CONTRAST: CPT | Performed by: RADIOLOGY

## 2025-07-02 PROCEDURE — 99284 EMERGENCY DEPT VISIT MOD MDM: CPT

## 2025-07-02 PROCEDURE — 93005 ELECTROCARDIOGRAM TRACING: CPT | Performed by: STUDENT IN AN ORGANIZED HEALTH CARE EDUCATION/TRAINING PROGRAM

## 2025-07-02 PROCEDURE — 96374 THER/PROPH/DIAG INJ IV PUSH: CPT

## 2025-07-02 PROCEDURE — 96375 TX/PRO/DX INJ NEW DRUG ADDON: CPT

## 2025-07-02 RX ORDER — DROPERIDOL 2.5 MG/ML
1.25 INJECTION, SOLUTION INTRAMUSCULAR; INTRAVENOUS ONCE
Status: COMPLETED | OUTPATIENT
Start: 2025-07-02 | End: 2025-07-02

## 2025-07-02 RX ORDER — ONDANSETRON 2 MG/ML
4 INJECTION INTRAMUSCULAR; INTRAVENOUS ONCE
Status: COMPLETED | OUTPATIENT
Start: 2025-07-02 | End: 2025-07-02

## 2025-07-02 RX ORDER — SODIUM CHLORIDE 0.9 % (FLUSH) 0.9 %
10 SYRINGE (ML) INJECTION AS NEEDED
Status: DISCONTINUED | OUTPATIENT
Start: 2025-07-02 | End: 2025-07-02 | Stop reason: HOSPADM

## 2025-07-02 RX ORDER — MORPHINE SULFATE 2 MG/ML
2 INJECTION, SOLUTION INTRAMUSCULAR; INTRAVENOUS ONCE
Status: COMPLETED | OUTPATIENT
Start: 2025-07-02 | End: 2025-07-02

## 2025-07-02 RX ORDER — METOCLOPRAMIDE HYDROCHLORIDE 5 MG/ML
10 INJECTION INTRAMUSCULAR; INTRAVENOUS ONCE
Status: COMPLETED | OUTPATIENT
Start: 2025-07-02 | End: 2025-07-02

## 2025-07-02 RX ORDER — METOCLOPRAMIDE 5 MG/1
5 TABLET ORAL 3 TIMES DAILY PRN
Qty: 30 TABLET | Refills: 0 | Status: SHIPPED | OUTPATIENT
Start: 2025-07-02

## 2025-07-02 RX ADMIN — SODIUM CHLORIDE 1000 ML: 9 INJECTION, SOLUTION INTRAVENOUS at 14:28

## 2025-07-02 RX ADMIN — MORPHINE SULFATE 2 MG: 2 INJECTION, SOLUTION INTRAMUSCULAR; INTRAVENOUS at 14:29

## 2025-07-02 RX ADMIN — ONDANSETRON 4 MG: 2 INJECTION INTRAMUSCULAR; INTRAVENOUS at 14:29

## 2025-07-02 RX ADMIN — DROPERIDOL 1.25 MG: 2.5 INJECTION, SOLUTION INTRAMUSCULAR; INTRAVENOUS at 16:32

## 2025-07-02 RX ADMIN — METOCLOPRAMIDE 10 MG: 5 INJECTION, SOLUTION INTRAMUSCULAR; INTRAVENOUS at 15:23

## 2025-07-26 NOTE — ED PROVIDER NOTES
Subjective   History of Present Illness  74-year-old female presents to the ER with multiple complaints.  Complaining of nausea vomiting, hypertension, and back pain.  Verbalize symptoms have been waxing and waning over the last 5 days.  Known history of hypertension.  History of COPD.  Reports that her vomiting actually started today.        Review of Systems   Constitutional: Negative.  Negative for fever.   HENT: Negative.     Respiratory: Negative.     Cardiovascular: Negative.  Negative for chest pain.   Gastrointestinal:  Positive for nausea and vomiting. Negative for abdominal pain.   Endocrine: Negative.    Genitourinary: Negative.  Negative for dysuria.   Musculoskeletal:  Positive for back pain.   Skin: Negative.    Neurological: Negative.    Psychiatric/Behavioral: Negative.     All other systems reviewed and are negative.      Past Medical History:   Diagnosis Date    Arthritis     COPD (chronic obstructive pulmonary disease)     Elevated cholesterol     High cholesterol     History of transfusion     Hyperlipidemia     Hypertension        No Known Allergies    Past Surgical History:   Procedure Laterality Date    BREAST BIOPSY Right     yrs ago benign    COLONOSCOPY      COLONOSCOPY N/A 5/10/2022    Procedure: COLONOSCOPY;  Surgeon: Kenia Braun MD;  Location: [facility];  Service: Gastroenterology;  Laterality: N/A;    ENDOSCOPY      ENDOSCOPY N/A 5/10/2022    Procedure: ESOPHAGOGASTRODUODENOSCOPY WITH BIOPSY;  Surgeon: Kenia Braun MD;  Location: [facility];  Service: Gastroenterology;  Laterality: N/A;    HIP SURGERY Right        Family History   Problem Relation Age of Onset    No Known Problems Mother     No Known Problems Father     No Known Problems Sister     No Known Problems Brother     No Known Problems Son     No Known Problems Daughter     No Known Problems Maternal Grandmother     No Known Problems Maternal Grandfather     No Known Problems Paternal Grandmother      No Known Problems Paternal Grandfather     No Known Problems Cousin     Rheum arthritis Neg Hx     Osteoarthritis Neg Hx     Asthma Neg Hx     Diabetes Neg Hx     Heart failure Neg Hx     Hyperlipidemia Neg Hx     Hypertension Neg Hx     Migraines Neg Hx     Rashes / Skin problems Neg Hx     Seizures Neg Hx     Stroke Neg Hx     Thyroid disease Neg Hx     Breast cancer Neg Hx        Social History     Socioeconomic History    Marital status:    Tobacco Use    Smoking status: Every Day     Current packs/day: 1.00     Average packs/day: 1 pack/day for 30.0 years (30.0 ttl pk-yrs)     Types: Cigarettes    Smokeless tobacco: Never   Vaping Use    Vaping status: Never Used   Substance and Sexual Activity    Alcohol use: No    Drug use: No    Sexual activity: Defer           Objective   Physical Exam  Vitals and nursing note reviewed.   Constitutional:       General: She is not in acute distress.     Appearance: She is well-developed. She is not diaphoretic.   HENT:      Head: Normocephalic and atraumatic.      Right Ear: External ear normal.      Left Ear: External ear normal.      Nose: Nose normal.   Eyes:      Conjunctiva/sclera: Conjunctivae normal.   Neck:      Vascular: No JVD.      Trachea: No tracheal deviation.   Cardiovascular:      Rate and Rhythm: Normal rate and regular rhythm.      Heart sounds: Normal heart sounds. No murmur heard.  Pulmonary:      Effort: Pulmonary effort is normal. No respiratory distress.      Breath sounds: Normal breath sounds. No wheezing.   Abdominal:      Palpations: Abdomen is soft.      Tenderness: There is no abdominal tenderness.   Musculoskeletal:         General: No deformity. Normal range of motion.      Cervical back: Normal range of motion and neck supple.   Skin:     General: Skin is warm and dry.      Coloration: Skin is not pale.      Findings: No erythema or rash.   Neurological:      Mental Status: She is alert and oriented to person, place, and time.       Cranial Nerves: No cranial nerve deficit.   Psychiatric:         Behavior: Behavior normal.         Thought Content: Thought content normal.         Procedures           ED Course  ED Course as of 07/26/25 1039   Wed Jul 02, 2025   1501 ECG 12 Lead QT Measurement  Normal sinus rhythm, rate 75, QTc 451, no acute ST or T wave changes [CW]   1554 Ct abd pelvis rad interpreted:  1.  Some bronchial wall thickening and possible tree-in-bud nodules left  lower lobe could represent sequela of chronic aspiration.  2.  Moderate colonic stool.   [RB]      ED Course User Index  [CW] Amos Vinson DO  [RB] Brock Poon II, PA                                                       Medical Decision Making  Problems Addressed:  Nausea and vomiting, unspecified vomiting type: complicated acute illness or injury    Amount and/or Complexity of Data Reviewed  Labs: ordered.  Radiology: ordered.  ECG/medicine tests: ordered. Decision-making details documented in ED Course.    Risk  Prescription drug management.        Final diagnoses:   Nausea and vomiting, unspecified vomiting type       ED Disposition  ED Disposition       ED Disposition   Discharge    Condition   Stable    Comment   --               Johann Juárez MD  [address] [id number] [phone]    Schedule an appointment as soon as possible for a visit            Medication List        New Prescriptions      metoclopramide 5 MG tablet  Commonly known as: REGLAN  Take 1 tablet by mouth 3 (Three) Times a Day As Needed (vomiting).               Where to Get Your Medications        These medications were sent to [facility], [place] - [address] - [phone]  - [phone]   [address] [place] [id number]      Phone: [phone]   metoclopramide 5 MG tablet            Brock Poon II, PA  07/26/25 1031

## (undated) DEVICE — SINGLE PORT MANIFOLD: Brand: NEPTUNE 2

## (undated) DEVICE — ENDOGATOR AUXILIARY WATER JET CONNECTOR: Brand: ENDOGATOR

## (undated) DEVICE — FRCP BX RADJAW4 NDL 2.8 240CM LG OG BX40

## (undated) DEVICE — THE BITE BLOCK MAXI, LATEX FREE STRAP IS USED TO PROTECT THE ENDOSCOPE INSERTION TUBE FROM BEING BITTEN BY THE PATIENT.

## (undated) DEVICE — TUBING, SUCTION, 1/4" X 20', STRAIGHT: Brand: MEDLINE INDUSTRIES, INC.

## (undated) DEVICE — CONN Y IRR DISP 1P/U

## (undated) DEVICE — SYR LUERLOK 30CC

## (undated) DEVICE — GOWN,REINF,POLY,ECL,PP SLV,XL: Brand: MEDLINE

## (undated) DEVICE — Device

## (undated) DEVICE — Device: Brand: DEFENDO AIR/WATER/SUCTION AND BIOPSY VALVE

## (undated) DEVICE — SUCTION CANISTER, 1500CC, RIGID: Brand: DEROYAL

## (undated) DEVICE — ENDOGATOR TUBING FOR ENDOGATOR EGP-100 IRRIGATION PUMP,OLYMPUS OFP PUMP, OLYMPUS AFU-100 PUMP AND ERBE EIP2 PUMP: Brand: ENDOGATOR